# Patient Record
Sex: MALE | Race: WHITE | NOT HISPANIC OR LATINO | Employment: FULL TIME | URBAN - METROPOLITAN AREA
[De-identification: names, ages, dates, MRNs, and addresses within clinical notes are randomized per-mention and may not be internally consistent; named-entity substitution may affect disease eponyms.]

---

## 2022-05-31 ENCOUNTER — LAB REQUISITION (OUTPATIENT)
Dept: LAB | Facility: CLINIC | Age: 32
End: 2022-05-31
Payer: COMMERCIAL

## 2022-05-31 DIAGNOSIS — M25.569 PAIN IN UNSPECIFIED KNEE: ICD-10-CM

## 2022-05-31 LAB
% LINING CELLS, BODY FLUID: 15 %
APPEARANCE FLD: ABNORMAL
CELL COUNT BODY FLUID SOURCE: ABNORMAL
COLOR FLD: YELLOW
LYMPHOCYTES NFR FLD MANUAL: 23 %
MONOS+MACROS NFR FLD MANUAL: 15 %
NEUTS BAND NFR FLD MANUAL: 47 %
WBC # FLD AUTO: 4809 /UL

## 2022-05-31 PROCEDURE — 87476 LYME DIS DNA AMP PROBE: CPT | Mod: ORL | Performed by: ORTHOPAEDIC SURGERY

## 2022-05-31 PROCEDURE — 87070 CULTURE OTHR SPECIMN AEROBIC: CPT | Mod: ORL | Performed by: ORTHOPAEDIC SURGERY

## 2022-05-31 PROCEDURE — 89051 BODY FLUID CELL COUNT: CPT | Mod: ORL | Performed by: ORTHOPAEDIC SURGERY

## 2022-06-03 LAB — B BURGDOR DNA SPEC QL NAA+PROBE: NOT DETECTED

## 2022-06-06 LAB — BACTERIA SNV CULT: NO GROWTH

## 2022-08-29 ENCOUNTER — LAB REQUISITION (OUTPATIENT)
Dept: LAB | Facility: CLINIC | Age: 32
End: 2022-08-29
Payer: COMMERCIAL

## 2022-08-29 DIAGNOSIS — M25.569 PAIN IN UNSPECIFIED KNEE: ICD-10-CM

## 2022-08-29 LAB
APPEARANCE FLD: ABNORMAL
CELL COUNT BODY FLUID SOURCE: ABNORMAL
COLOR FLD: YELLOW
CRYSTALS SNV MICRO: ABNORMAL
GRAM STAIN RESULT: NORMAL
GRAM STAIN RESULT: NORMAL
LYMPHOCYTES NFR FLD MANUAL: 20 %
MONOS+MACROS NFR FLD MANUAL: 30 %
NEUTS BAND NFR FLD MANUAL: 50 %
WBC # FLD AUTO: 7874 /UL

## 2022-08-29 PROCEDURE — 87205 SMEAR GRAM STAIN: CPT | Mod: ORL | Performed by: ORTHOPAEDIC SURGERY

## 2022-08-29 PROCEDURE — 89051 BODY FLUID CELL COUNT: CPT | Mod: ORL | Performed by: ORTHOPAEDIC SURGERY

## 2022-08-29 PROCEDURE — 87075 CULTR BACTERIA EXCEPT BLOOD: CPT | Mod: ORL | Performed by: ORTHOPAEDIC SURGERY

## 2022-08-29 PROCEDURE — 89060 EXAM SYNOVIAL FLUID CRYSTALS: CPT | Mod: ORL | Performed by: ORTHOPAEDIC SURGERY

## 2022-08-29 PROCEDURE — 87070 CULTURE OTHR SPECIMN AEROBIC: CPT | Mod: ORL | Performed by: ORTHOPAEDIC SURGERY

## 2022-09-03 LAB — BACTERIA SNV CULT: NO GROWTH

## 2022-09-12 LAB — BACTERIA SNV CULT: NORMAL

## 2024-10-28 ENCOUNTER — APPOINTMENT (OUTPATIENT)
Dept: GENERAL RADIOLOGY | Facility: CLINIC | Age: 34
End: 2024-10-28
Attending: EMERGENCY MEDICINE
Payer: COMMERCIAL

## 2024-10-28 ENCOUNTER — HOSPITAL ENCOUNTER (EMERGENCY)
Facility: CLINIC | Age: 34
Discharge: HOME | End: 2024-10-28
Attending: STUDENT IN AN ORGANIZED HEALTH CARE EDUCATION/TRAINING PROGRAM | Admitting: STUDENT IN AN ORGANIZED HEALTH CARE EDUCATION/TRAINING PROGRAM
Payer: COMMERCIAL

## 2024-10-28 ENCOUNTER — APPOINTMENT (OUTPATIENT)
Dept: ULTRASOUND IMAGING | Facility: CLINIC | Age: 34
End: 2024-10-28
Attending: STUDENT IN AN ORGANIZED HEALTH CARE EDUCATION/TRAINING PROGRAM
Payer: COMMERCIAL

## 2024-10-28 VITALS
DIASTOLIC BLOOD PRESSURE: 96 MMHG | OXYGEN SATURATION: 99 % | SYSTOLIC BLOOD PRESSURE: 166 MMHG | WEIGHT: 228 LBS | HEIGHT: 72 IN | TEMPERATURE: 98.2 F | RESPIRATION RATE: 18 BRPM | BODY MASS INDEX: 30.88 KG/M2 | HEART RATE: 88 BPM

## 2024-10-28 DIAGNOSIS — E80.6 UNCONJUGATED HYPERBILIRUBINEMIA: ICD-10-CM

## 2024-10-28 DIAGNOSIS — M79.621 PAIN OF RIGHT UPPER ARM: ICD-10-CM

## 2024-10-28 LAB
ALBUMIN SERPL BCG-MCNC: 4.5 G/DL (ref 3.5–5.2)
ALBUMIN SERPL BCG-MCNC: 4.5 G/DL (ref 3.5–5.2)
ALP SERPL-CCNC: 260 U/L (ref 40–150)
ALP SERPL-CCNC: 264 U/L (ref 40–150)
ALT SERPL W P-5'-P-CCNC: 43 U/L (ref 0–70)
ALT SERPL W P-5'-P-CCNC: 45 U/L (ref 0–70)
ANION GAP SERPL CALCULATED.3IONS-SCNC: 12 MMOL/L (ref 7–15)
APAP SERPL-MCNC: <5 UG/ML (ref 10–30)
AST SERPL W P-5'-P-CCNC: ABNORMAL U/L
AST SERPL W P-5'-P-CCNC: ABNORMAL U/L
BILIRUB DIRECT SERPL-MCNC: 1.27 MG/DL (ref 0–0.3)
BILIRUB SERPL-MCNC: 4.6 MG/DL
BILIRUB SERPL-MCNC: 4.6 MG/DL
BILIRUB SERPL-MCNC: 4.7 MG/DL
BUN SERPL-MCNC: 7.6 MG/DL (ref 6–20)
CALCIUM SERPL-MCNC: 10 MG/DL (ref 8.8–10.4)
CHLORIDE SERPL-SCNC: 97 MMOL/L (ref 98–107)
CK SERPL-CCNC: 78 U/L (ref 39–308)
CREAT SERPL-MCNC: 0.74 MG/DL (ref 0.67–1.17)
EGFRCR SERPLBLD CKD-EPI 2021: >90 ML/MIN/1.73M2
ERYTHROCYTE [DISTWIDTH] IN BLOOD BY AUTOMATED COUNT: 13.8 % (ref 10–15)
GLUCOSE SERPL-MCNC: 134 MG/DL (ref 70–99)
HCO3 SERPL-SCNC: 26 MMOL/L (ref 22–29)
HCT VFR BLD AUTO: 40.2 % (ref 40–53)
HGB BLD-MCNC: 13.9 G/DL (ref 13.3–17.7)
HOLD SPECIMEN: NORMAL
MCH RBC QN AUTO: 34.6 PG (ref 26.5–33)
MCHC RBC AUTO-ENTMCNC: 34.6 G/DL (ref 31.5–36.5)
MCV RBC AUTO: 100 FL (ref 78–100)
MONOCYTES NFR BLD AUTO: NEGATIVE %
PLATELET # BLD AUTO: 118 10E3/UL (ref 150–450)
POTASSIUM SERPL-SCNC: 4.6 MMOL/L (ref 3.4–5.3)
PROT SERPL-MCNC: 9.2 G/DL (ref 6.4–8.3)
PROT SERPL-MCNC: 9.3 G/DL (ref 6.4–8.3)
RBC # BLD AUTO: 4.02 10E6/UL (ref 4.4–5.9)
SODIUM SERPL-SCNC: 135 MMOL/L (ref 135–145)
WBC # BLD AUTO: 6.5 10E3/UL (ref 4–11)

## 2024-10-28 PROCEDURE — 82247 BILIRUBIN TOTAL: CPT | Performed by: STUDENT IN AN ORGANIZED HEALTH CARE EDUCATION/TRAINING PROGRAM

## 2024-10-28 PROCEDURE — 86308 HETEROPHILE ANTIBODY SCREEN: CPT | Performed by: STUDENT IN AN ORGANIZED HEALTH CARE EDUCATION/TRAINING PROGRAM

## 2024-10-28 PROCEDURE — 93971 EXTREMITY STUDY: CPT | Mod: RT

## 2024-10-28 PROCEDURE — 84155 ASSAY OF PROTEIN SERUM: CPT | Performed by: STUDENT IN AN ORGANIZED HEALTH CARE EDUCATION/TRAINING PROGRAM

## 2024-10-28 PROCEDURE — 99285 EMERGENCY DEPT VISIT HI MDM: CPT | Mod: 25

## 2024-10-28 PROCEDURE — 250N000013 HC RX MED GY IP 250 OP 250 PS 637: Performed by: STUDENT IN AN ORGANIZED HEALTH CARE EDUCATION/TRAINING PROGRAM

## 2024-10-28 PROCEDURE — 36415 COLL VENOUS BLD VENIPUNCTURE: CPT | Performed by: STUDENT IN AN ORGANIZED HEALTH CARE EDUCATION/TRAINING PROGRAM

## 2024-10-28 PROCEDURE — 85014 HEMATOCRIT: CPT | Performed by: STUDENT IN AN ORGANIZED HEALTH CARE EDUCATION/TRAINING PROGRAM

## 2024-10-28 PROCEDURE — 82550 ASSAY OF CK (CPK): CPT | Performed by: STUDENT IN AN ORGANIZED HEALTH CARE EDUCATION/TRAINING PROGRAM

## 2024-10-28 PROCEDURE — 86618 LYME DISEASE ANTIBODY: CPT | Performed by: STUDENT IN AN ORGANIZED HEALTH CARE EDUCATION/TRAINING PROGRAM

## 2024-10-28 PROCEDURE — 73030 X-RAY EXAM OF SHOULDER: CPT | Mod: RT

## 2024-10-28 PROCEDURE — 80143 DRUG ASSAY ACETAMINOPHEN: CPT | Performed by: STUDENT IN AN ORGANIZED HEALTH CARE EDUCATION/TRAINING PROGRAM

## 2024-10-28 PROCEDURE — 82248 BILIRUBIN DIRECT: CPT | Performed by: STUDENT IN AN ORGANIZED HEALTH CARE EDUCATION/TRAINING PROGRAM

## 2024-10-28 RX ORDER — OXYCODONE HYDROCHLORIDE 5 MG/1
5 TABLET ORAL ONCE
Status: COMPLETED | OUTPATIENT
Start: 2024-10-28 | End: 2024-10-28

## 2024-10-28 RX ORDER — OXYCODONE HYDROCHLORIDE 5 MG/1
5 TABLET ORAL EVERY 6 HOURS PRN
Qty: 10 TABLET | Refills: 0 | Status: SHIPPED | OUTPATIENT
Start: 2024-10-28

## 2024-10-28 RX ADMIN — OXYCODONE HYDROCHLORIDE 5 MG: 5 TABLET ORAL at 12:18

## 2024-10-28 ASSESSMENT — ACTIVITIES OF DAILY LIVING (ADL)
ADLS_ACUITY_SCORE: 0

## 2024-10-28 ASSESSMENT — COLUMBIA-SUICIDE SEVERITY RATING SCALE - C-SSRS
6. HAVE YOU EVER DONE ANYTHING, STARTED TO DO ANYTHING, OR PREPARED TO DO ANYTHING TO END YOUR LIFE?: NO
2. HAVE YOU ACTUALLY HAD ANY THOUGHTS OF KILLING YOURSELF IN THE PAST MONTH?: NO
1. IN THE PAST MONTH, HAVE YOU WISHED YOU WERE DEAD OR WISHED YOU COULD GO TO SLEEP AND NOT WAKE UP?: NO

## 2024-10-28 NOTE — ED TRIAGE NOTES
Pt presents with right arm/shoulder pain ongoing for past two days. Pt reports he was camping yesterday when he started to notice. Pt reports his neck is stiff as well. Pt slept on camping pad then in  car during pain. Pt took 1500 mg tylenol and 150 mg of diclofenac.      Triage Assessment (Adult)       Row Name 10/28/24 1117          Triage Assessment    Airway WDL WDL        Respiratory WDL    Respiratory WDL WDL        Cognitive/Neuro/Behavioral WDL    Cognitive/Neuro/Behavioral WDL WDL

## 2024-10-29 LAB — B BURGDOR IGG+IGM SER QL: 0.32

## 2025-05-13 NOTE — ED PROVIDER NOTES
Emergency Department Note      History of Present Illness     Chief Complaint   Generalized Body Aches      GAIL Brown is a 34 year old male here for evaluation of generalized body aches. Patient reports neck stiffness, bilateral shoulder and hand stiffness, mild leg swelling for the past two days. Right shoulder more stiff than left. States his right knuckles are stiff and unable to close his hand. Pain was intolerable this morning. States he just returned from camping yesterday at  [facility] ground  Took 1500 mg Tylenol this morning between 6 and 7 AM and 150 mg Diclonefnac at 1200 which improved his pain. States he slept on his right side. Notes history of gout in his left knee, doesn't feel like this. Denies double vision, headache, fevers, rashes, jvd, chest pain, shortness of breath. Works as  for [employer].     Independent Historian   None    Review of External Notes   None    Past Medical History     Medical History and Problem List   The patient does not have any past pertinent medical history.     Medications   The patient is currently on no regular medications.      Physical Exam     Patient Vitals for the past 24 hrs:   BP Temp Temp src Pulse Resp SpO2 Height Weight   10/28/24 1700 -- -- -- 88 -- -- -- --   10/28/24 1120 (!) 166/96 98.2  F (36.8  C) Temporal 118 18 99 % 1.829 m (6') 103.4 kg (228 lb)     Physical Exam  General: Awake, alert, in no acute distress   HEENT: Atraumatic   EOM normal   External ears normal   Trachea midline  Neck: Supple, normal ROM  CV: Regular rate, regular rhythm   No lower extremity edema  2+ radial and DP pulses  PULM: Breath sounds normal bilaterally  No wheezes or rales  ABD: Soft, non-tender, non-distended  Normal bowel sounds   No rebound or guarding   MSK: No gross deformities.   Mild swelling right hand  No erythema on right upper extremity  Right arm compartments are soft.  No focal bony tenderness.  Right elbow  with nontender bursa swelling and tophi.  NEURO: Alert, no focal deficits  5 out of 5 strength with right hand .  Skin: Warm, dry and intact      Diagnostics     Lab Results   Labs Ordered and Resulted from Time of ED Arrival to Time of ED Departure   CBC WITH PLATELETS - Abnormal       Result Value    WBC Count 6.5      RBC Count 4.02 (*)     Hemoglobin 13.9      Hematocrit 40.2            MCH 34.6 (*)     MCHC 34.6      RDW 13.8      Platelet Count 118 (*)    COMPREHENSIVE METABOLIC PANEL - Abnormal    Sodium 135      Potassium 4.6      Carbon Dioxide (CO2) 26      Anion Gap 12      Urea Nitrogen 7.6      Creatinine 0.74      GFR Estimate >90      Calcium 10.0      Chloride 97 (*)     Glucose 134 (*)     Alkaline Phosphatase 260 (*)     AST        ALT 43      Protein Total 9.2 (*)     Albumin 4.5      Bilirubin Total 4.6 (*)    ACETAMINOPHEN LEVEL - Abnormal    Acetaminophen <5.0 (*)    HEPATIC FUNCTION PANEL - Abnormal    Protein Total 9.3 (*)     Albumin 4.5      Bilirubin Total 4.6 (*)     Alkaline Phosphatase 264 (*)     AST        ALT 45      Bilirubin Direct 1.27 (*)    BILIRUBIN TOTAL - Abnormal    Bilirubin Total 4.7 (*)    CK TOTAL - Normal    CK 78     MONONUCLEOSIS SCREEN - Normal    Mononucleosis Screen Negative     LYME DISEASE TOTAL ANTIBODIES WITH REFLEX TO CONFIRMATION       Imaging   US Upper Extremity Venous Duplex Right   Final Result   IMPRESSION: Normal upper extremity venous ultrasound.      BRITANY PLUMMER MD            SYSTEM ID:  [id number]      XR Shoulder Right G/E 3 Views   Final Result   IMPRESSION: The right glenohumeral and acromioclavicular joints are   negative for fracture or dislocation. Mild hypertrophic change of the   AC joint.      PANTERA ADAMSON MD            SYSTEM ID:  [id number]            Independent Interpretation   None    ED Course      Medications Administered   Medications   oxyCODONE (ROXICODONE) tablet 5 mg (5 mg Oral $Given 10/28/24 1219)        Procedures   Procedures     Discussion of Management   None    ED Course   ED Course as of 10/28/24 2110   Mon Oct 28, 2024   1200 I obtained history and examined the patient as noted above.        Additional Documentation  None    Medical Decision Making / Diagnosis     CMS Diagnoses: None    MIPS       None    SHAYLEE   Ricci Brown is a 34 year old male who presents with nonspecific pain of the right arm.  He has history of gout in his left knee, chronic changes of the right elbow to suggest gout.  X-ray of the shoulder was negative for fractures or dislocations.  CK and kidney function are normal making traumatic or compressive rhabdomyolysis unlikely.  His compartments are soft.  There is no overlying erythema, no specific joint swelling to make septic or gouty arthritis higher on my differential today.  He does have been indirect hyperbilirubinemia and mild thrombocytopenia.  He says he is recovering from a viral illness last week which could be the cause though also endorses more alcohol intake than he would like.  I encouraged him to cut back.  I will send a referral for primary care for to establish care and for recheck of LFTs.  His Tylenol level is undetectable at 6 to 7 hours post minimal accidental overdose, I do not think his LFTs are elevated from acetaminophen toxicity.  Given his report of myalgias, and recent camping near the [facility], I will send a Lyme titer which is pending on discharge.  Discussed appropriate Tylenol and Voltaren dosing.  Will prescribe short course of oxycodone for breakthrough pain, ED return precautions discussed.  Patient discharged home in improved conditions.  All questions answered.    Disposition   The patient was discharged.     Diagnosis     ICD-10-CM    1. Pain of right upper arm  M79.621 Primary Care Referral      2. Unconjugated hyperbilirubinemia  E80.6            Discharge Medications   Discharge Medication List as of 10/28/2024  5:22 PM         START taking these medications    Details   oxyCODONE (ROXICODONE) 5 MG tablet Take 1 tablet (5 mg) by mouth every 6 hours as needed for breakthrough pain., Disp-10 tablet, R-0, E-Prescribe               Scribe Disclosure:  I, Rae Baird, am serving as a scribe at 5:51 PM on 10/28/2024 to document services personally performed by Elizabeth Belcher,   based on my observations and the provider's statements to me.        Elizabeth Belcher DO  10/28/24 2110     No

## 2025-07-08 ENCOUNTER — HOSPITAL ENCOUNTER (INPATIENT)
Facility: CLINIC | Age: 35
End: 2025-07-08
Attending: EMERGENCY MEDICINE | Admitting: STUDENT IN AN ORGANIZED HEALTH CARE EDUCATION/TRAINING PROGRAM
Payer: COMMERCIAL

## 2025-07-08 ENCOUNTER — APPOINTMENT (OUTPATIENT)
Dept: ULTRASOUND IMAGING | Facility: CLINIC | Age: 35
End: 2025-07-08
Attending: EMERGENCY MEDICINE
Payer: COMMERCIAL

## 2025-07-08 ENCOUNTER — APPOINTMENT (OUTPATIENT)
Dept: CT IMAGING | Facility: CLINIC | Age: 35
End: 2025-07-08
Attending: EMERGENCY MEDICINE
Payer: COMMERCIAL

## 2025-07-08 DIAGNOSIS — K66.8 PNEUMOPERITONEUM: Primary | ICD-10-CM

## 2025-07-08 DIAGNOSIS — K80.21 CALCULUS OF GALLBLADDER WITH BILIARY OBSTRUCTION BUT WITHOUT CHOLECYSTITIS: ICD-10-CM

## 2025-07-08 DIAGNOSIS — K70.31 ALCOHOLIC CIRRHOSIS OF LIVER WITH ASCITES (H): ICD-10-CM

## 2025-07-08 LAB
ALBUMIN SERPL BCG-MCNC: 4.4 G/DL (ref 3.5–5.2)
ALBUMIN UR-MCNC: 50 MG/DL
ALP SERPL-CCNC: 286 U/L (ref 40–150)
ALT SERPL W P-5'-P-CCNC: 64 U/L (ref 0–70)
ANION GAP SERPL CALCULATED.3IONS-SCNC: 18 MMOL/L (ref 7–15)
APPEARANCE UR: ABNORMAL
AST SERPL W P-5'-P-CCNC: 231 U/L (ref 0–45)
BACTERIA #/AREA URNS HPF: ABNORMAL /HPF
BASOPHILS # BLD AUTO: 0 10E3/UL (ref 0–0.2)
BASOPHILS NFR BLD AUTO: 1 %
BILIRUB DIRECT SERPL-MCNC: 2.44 MG/DL (ref 0–0.3)
BILIRUB SERPL-MCNC: 5.9 MG/DL
BILIRUB UR QL STRIP: ABNORMAL
BUN SERPL-MCNC: 5.3 MG/DL (ref 6–20)
CALCIUM SERPL-MCNC: 10.3 MG/DL (ref 8.8–10.4)
CHLORIDE SERPL-SCNC: 97 MMOL/L (ref 98–107)
COLOR UR AUTO: ABNORMAL
CREAT SERPL-MCNC: 0.72 MG/DL (ref 0.67–1.17)
EGFRCR SERPLBLD CKD-EPI 2021: >90 ML/MIN/1.73M2
EOSINOPHIL # BLD AUTO: 0.1 10E3/UL (ref 0–0.7)
EOSINOPHIL NFR BLD AUTO: 1 %
ERYTHROCYTE [DISTWIDTH] IN BLOOD BY AUTOMATED COUNT: 13 % (ref 10–15)
ETHANOL SERPL-MCNC: 0.03 G/DL
GLUCOSE SERPL-MCNC: 167 MG/DL (ref 70–99)
GLUCOSE UR STRIP-MCNC: 50 MG/DL
HCO3 SERPL-SCNC: 22 MMOL/L (ref 22–29)
HCT VFR BLD AUTO: 42.4 % (ref 40–53)
HGB BLD-MCNC: 14.7 G/DL (ref 13.3–17.7)
HGB UR QL STRIP: NEGATIVE
HOLD SPECIMEN: NORMAL
HOLD SPECIMEN: NORMAL
HYALINE CASTS: 29 /LPF
IMM GRANULOCYTES # BLD: 0 10E3/UL
IMM GRANULOCYTES NFR BLD: 0 %
KETONES UR STRIP-MCNC: NEGATIVE MG/DL
LEUKOCYTE ESTERASE UR QL STRIP: NEGATIVE
LIPASE SERPL-CCNC: 38 U/L (ref 13–60)
LYMPHOCYTES # BLD AUTO: 1 10E3/UL (ref 0.8–5.3)
LYMPHOCYTES NFR BLD AUTO: 22 %
MCH RBC QN AUTO: 34.8 PG (ref 26.5–33)
MCHC RBC AUTO-ENTMCNC: 34.7 G/DL (ref 31.5–36.5)
MCV RBC AUTO: 100 FL (ref 78–100)
MONOCYTES # BLD AUTO: 0.2 10E3/UL (ref 0–1.3)
MONOCYTES NFR BLD AUTO: 5 %
MUCOUS THREADS #/AREA URNS LPF: PRESENT /LPF
NEUTROPHILS # BLD AUTO: 3.2 10E3/UL (ref 1.6–8.3)
NEUTROPHILS NFR BLD AUTO: 71 %
NITRATE UR QL: NEGATIVE
NRBC # BLD AUTO: 0 10E3/UL
NRBC BLD AUTO-RTO: 0 /100
PH UR STRIP: 5.5 [PH] (ref 5–7)
PLATELET # BLD AUTO: 68 10E3/UL (ref 150–450)
POTASSIUM SERPL-SCNC: 4.3 MMOL/L (ref 3.4–5.3)
PROT SERPL-MCNC: 9 G/DL (ref 6.4–8.3)
RBC # BLD AUTO: 4.23 10E6/UL (ref 4.4–5.9)
RBC URINE: 3 /HPF
SODIUM SERPL-SCNC: 137 MMOL/L (ref 135–145)
SP GR UR STRIP: 1.03 (ref 1–1.03)
SQUAMOUS EPITHELIAL: 1 /HPF
UROBILINOGEN UR STRIP-MCNC: 3 MG/DL
WBC # BLD AUTO: 4.5 10E3/UL (ref 4–11)
WBC URINE: 8 /HPF

## 2025-07-08 PROCEDURE — 82947 ASSAY GLUCOSE BLOOD QUANT: CPT | Performed by: EMERGENCY MEDICINE

## 2025-07-08 PROCEDURE — 96375 TX/PRO/DX INJ NEW DRUG ADDON: CPT

## 2025-07-08 PROCEDURE — 120N000001 HC R&B MED SURG/OB

## 2025-07-08 PROCEDURE — 250N000009 HC RX 250: Performed by: EMERGENCY MEDICINE

## 2025-07-08 PROCEDURE — 85004 AUTOMATED DIFF WBC COUNT: CPT | Performed by: EMERGENCY MEDICINE

## 2025-07-08 PROCEDURE — 86709 HEPATITIS A IGM ANTIBODY: CPT | Performed by: STUDENT IN AN ORGANIZED HEALTH CARE EDUCATION/TRAINING PROGRAM

## 2025-07-08 PROCEDURE — 99285 EMERGENCY DEPT VISIT HI MDM: CPT | Mod: 25 | Performed by: EMERGENCY MEDICINE

## 2025-07-08 PROCEDURE — 250N000011 HC RX IP 250 OP 636: Performed by: EMERGENCY MEDICINE

## 2025-07-08 PROCEDURE — 83690 ASSAY OF LIPASE: CPT | Performed by: EMERGENCY MEDICINE

## 2025-07-08 PROCEDURE — 99222 1ST HOSP IP/OBS MODERATE 55: CPT | Performed by: STUDENT IN AN ORGANIZED HEALTH CARE EDUCATION/TRAINING PROGRAM

## 2025-07-08 PROCEDURE — 74177 CT ABD & PELVIS W/CONTRAST: CPT

## 2025-07-08 PROCEDURE — 82248 BILIRUBIN DIRECT: CPT | Performed by: EMERGENCY MEDICINE

## 2025-07-08 PROCEDURE — 82077 ASSAY SPEC XCP UR&BREATH IA: CPT | Performed by: EMERGENCY MEDICINE

## 2025-07-08 PROCEDURE — 76705 ECHO EXAM OF ABDOMEN: CPT

## 2025-07-08 PROCEDURE — 36415 COLL VENOUS BLD VENIPUNCTURE: CPT | Performed by: EMERGENCY MEDICINE

## 2025-07-08 PROCEDURE — 81001 URINALYSIS AUTO W/SCOPE: CPT | Performed by: EMERGENCY MEDICINE

## 2025-07-08 PROCEDURE — 258N000003 HC RX IP 258 OP 636: Performed by: EMERGENCY MEDICINE

## 2025-07-08 PROCEDURE — 96374 THER/PROPH/DIAG INJ IV PUSH: CPT | Mod: 59

## 2025-07-08 RX ORDER — KETOROLAC TROMETHAMINE 15 MG/ML
15 INJECTION, SOLUTION INTRAMUSCULAR; INTRAVENOUS ONCE
Status: COMPLETED | OUTPATIENT
Start: 2025-07-08 | End: 2025-07-08

## 2025-07-08 RX ORDER — LIDOCAINE 40 MG/G
CREAM TOPICAL
Status: ACTIVE | OUTPATIENT
Start: 2025-07-08

## 2025-07-08 RX ORDER — AMOXICILLIN 250 MG
2 CAPSULE ORAL 2 TIMES DAILY PRN
Status: DISPENSED | OUTPATIENT
Start: 2025-07-08

## 2025-07-08 RX ORDER — MORPHINE SULFATE 4 MG/ML
4 INJECTION, SOLUTION INTRAMUSCULAR; INTRAVENOUS
Refills: 0 | Status: DISCONTINUED | OUTPATIENT
Start: 2025-07-08 | End: 2025-07-09 | Stop reason: ALTCHOICE

## 2025-07-08 RX ORDER — AMOXICILLIN 250 MG
1 CAPSULE ORAL 2 TIMES DAILY PRN
Status: ACTIVE | OUTPATIENT
Start: 2025-07-08

## 2025-07-08 RX ORDER — IOPAMIDOL 755 MG/ML
500 INJECTION, SOLUTION INTRAVASCULAR ONCE
Status: COMPLETED | OUTPATIENT
Start: 2025-07-08 | End: 2025-07-08

## 2025-07-08 RX ORDER — CALCIUM CARBONATE 500 MG/1
1000 TABLET, CHEWABLE ORAL 4 TIMES DAILY PRN
Status: DISPENSED | OUTPATIENT
Start: 2025-07-08

## 2025-07-08 RX ORDER — THERA TABS 400 MCG
1 TAB ORAL DAILY
Status: ON HOLD | COMMUNITY

## 2025-07-08 RX ORDER — ONDANSETRON 2 MG/ML
4 INJECTION INTRAMUSCULAR; INTRAVENOUS EVERY 30 MIN PRN
Status: COMPLETED | OUTPATIENT
Start: 2025-07-08 | End: 2025-07-15

## 2025-07-08 RX ORDER — DICLOFENAC SODIUM 75 MG/1
150 TABLET, DELAYED RELEASE ORAL DAILY
Status: ON HOLD | COMMUNITY

## 2025-07-08 RX ADMIN — SODIUM CHLORIDE 65 ML: 9 INJECTION, SOLUTION INTRAVENOUS at 16:19

## 2025-07-08 RX ADMIN — SODIUM CHLORIDE, SODIUM LACTATE, POTASSIUM CHLORIDE, AND CALCIUM CHLORIDE 1000 ML: .6; .31; .03; .02 INJECTION, SOLUTION INTRAVENOUS at 16:11

## 2025-07-08 RX ADMIN — IOPAMIDOL 100 ML: 755 INJECTION, SOLUTION INTRAVENOUS at 16:19

## 2025-07-08 RX ADMIN — ONDANSETRON 4 MG: 2 INJECTION, SOLUTION INTRAMUSCULAR; INTRAVENOUS at 16:12

## 2025-07-08 RX ADMIN — MORPHINE SULFATE 4 MG: 4 INJECTION, SOLUTION INTRAMUSCULAR; INTRAVENOUS at 17:55

## 2025-07-08 RX ADMIN — KETOROLAC TROMETHAMINE 15 MG: 15 INJECTION, SOLUTION INTRAMUSCULAR; INTRAVENOUS at 15:15

## 2025-07-08 ASSESSMENT — ACTIVITIES OF DAILY LIVING (ADL)
ADLS_ACUITY_SCORE: 20
ADLS_ACUITY_SCORE: 15
ADLS_ACUITY_SCORE: 41
ADLS_ACUITY_SCORE: 15
ADLS_ACUITY_SCORE: 15
ADLS_ACUITY_SCORE: 41

## 2025-07-08 ASSESSMENT — COLUMBIA-SUICIDE SEVERITY RATING SCALE - C-SSRS
1. IN THE PAST MONTH, HAVE YOU WISHED YOU WERE DEAD OR WISHED YOU COULD GO TO SLEEP AND NOT WAKE UP?: NO
6. HAVE YOU EVER DONE ANYTHING, STARTED TO DO ANYTHING, OR PREPARED TO DO ANYTHING TO END YOUR LIFE?: NO
2. HAVE YOU ACTUALLY HAD ANY THOUGHTS OF KILLING YOURSELF IN THE PAST MONTH?: NO

## 2025-07-08 NOTE — ED PROVIDER NOTES
Emergency Department Note      History of Present Illness     Chief Complaint   Abdominal Pain      HPI   HPI   Ricci Brown is a 35 year old male with no significant past medical history significant who presents to the ED via/accompanied by self with a chief complaint of right upper quadrant abdominal pain onset approximately 20 minutes prior to arrival while the patient was at work, nonradiating, worse with movement and deep breaths without specific alleviating factors.  Patient has chest pain, shortness of breath, vomiting although endorses nausea, changes in urination or bowel movements, previous abdominal surgeries or similar episodes previously    Independent Historian   None    Review of External Notes   None pertinent    Past Medical History     Medical History and Problem List   No past medical history on file.    Medications   oxyCODONE (ROXICODONE) 5 MG tablet        Surgical History   No past surgical history on file.    Physical Exam     Patient Vitals for the past 24 hrs:   BP Temp Pulse Resp SpO2 Height Weight   07/08/25 1800 (!) 140/81 -- (!) 121 22 96 % -- --   07/08/25 1700 122/81 -- 105 23 93 % -- --   07/08/25 1640 136/78 -- 107 -- 94 % -- --   07/08/25 1507 (!) 155/106 -- -- -- -- -- --   07/08/25 1505 -- 98.5  F (36.9  C) (!) 123 24 97 % 1.829 m (6') 106.6 kg (235 lb)     Physical Exam  Constitutional: Well developed, uncomfortable, nontox appearance  Head: Atraumatic.   Neck:  no stridor  Eyes: scleral icterus  Cardiovascular: regular tachycardia, 2+ bilat radial pulses  Pulmonary/Chest: nml resp effort, Clear BS bilat  Abdominal: ND, soft, RUQ tenderness, no rebound or guarding   : no CVA tenderness bilat  Ext: Warm, well perfused, trace bilat LE edema  Neurological: A&O, symmetric facies, moves ext x4  Skin: Skin is warm and dry.   Psychiatric: Behavior is normal. Thought content normal.   Nursing note and vitals reviewed.    Diagnostics     Lab Results   Labs Ordered and  Resulted from Time of ED Arrival to Time of ED Departure   COMPREHENSIVE METABOLIC PANEL - Abnormal       Result Value    Sodium 137      Potassium 4.3      Carbon Dioxide (CO2) 22      Anion Gap 18 (*)     Urea Nitrogen 5.3 (*)     Creatinine 0.72      GFR Estimate >90      Calcium 10.3      Chloride 97 (*)     Glucose 167 (*)     Alkaline Phosphatase 286 (*)      (*)     ALT 64      Protein Total 9.0 (*)     Albumin 4.4      Bilirubin Total 5.9 (*)    CBC WITH PLATELETS AND DIFFERENTIAL - Abnormal    WBC Count 4.5      RBC Count 4.23 (*)     Hemoglobin 14.7      Hematocrit 42.4            MCH 34.8 (*)     MCHC 34.7      RDW 13.0      Platelet Count 68 (*)     % Neutrophils 71      % Lymphocytes 22      % Monocytes 5      % Eosinophils 1      % Basophils 1      % Immature Granulocytes 0      NRBCs per 100 WBC 0      Absolute Neutrophils 3.2      Absolute Lymphocytes 1.0      Absolute Monocytes 0.2      Absolute Eosinophils 0.1      Absolute Basophils 0.0      Absolute Immature Granulocytes 0.0      Absolute NRBCs 0.0     ROUTINE UA WITH MICROSCOPIC REFLEX TO CULTURE - Abnormal    Color Urine Orange (*)     Appearance Urine Slightly Cloudy (*)     Glucose Urine 50 (*)     Bilirubin Urine Moderate (*)     Ketones Urine Negative      Specific Gravity Urine 1.034      Blood Urine Negative      pH Urine 5.5      Protein Albumin Urine 50 (*)     Urobilinogen Urine 3.0 (*)     Nitrite Urine Negative      Leukocyte Esterase Urine Negative      Bacteria Urine Few (*)     Mucus Urine Present (*)     RBC Urine 3 (*)     WBC Urine 8 (*)     Squamous Epithelials Urine 1      Hyaline Casts Urine 29 (*)    BILIRUBIN DIRECT - Abnormal    Bilirubin Direct 2.44 (*)    ETHANOL LEVEL BLOOD - Abnormal    Ethanol Level Blood 0.03 (*)    LIPASE - Normal    Lipase 38         Imaging   CT Abdomen Pelvis w Contrast   Final Result   IMPRESSION:    1.  Cirrhotic morphology of the liver with evidence of portal hypertension and  trace abdominopelvic free fluid. No definite drainable ascites at this time.   2.  Superimposed hepatic steatosis is nonspecific but given heterogeneity and subtle surrounding fat stranding, superimposed acute hepatitis is also possible.   3.  Likely cholelithiasis and/or sludge with some subtle fat stranding adjacent to gallbladder, could be related to #'s 1 and 2, but consider further evaluation with right upper quadrant ultrasound if there is clinical concern for acute cholecystitis. No    evidence of biliary obstruction.      US Abdomen Limited    (Results Pending)       EKG   No results found for this or any previous visit. .    Independent Interpretation   None    ED Course      Medications Administered   Medications   ondansetron (ZOFRAN) injection 4 mg (4 mg Intravenous $Given 7/8/25 1612)   morphine (PF) injection 4 mg (4 mg Intravenous $Given 7/8/25 1755)   ketorolac (TORADOL) injection 15 mg (15 mg Intravenous $Given 7/8/25 1515)   lactated ringers BOLUS 1,000 mL (1,000 mLs Intravenous $New Bag 7/8/25 1611)   iopamidol (ISOVUE-370) solution 500 mL (100 mLs Intravenous $Given 7/8/25 1619)   sodium chloride 0.9 % bag for CT scan flush (65 mLs Intravenous $Given 7/8/25 1619)       Procedures   Procedures     Discussion of Management   Admitting HospitalistOziel    ED Course        Additional Documentation  Social determinants include alcohol use potentially increasing risk for presentation to the ED    Medical Decision Making / Diagnosis     CMS Diagnoses: None    MIPS       None    MDM     35 year old male presenting w/ right upper quadrant abdominal pain    Differential diagnosis includes hepatitis, cholelithiasis, cholecystitis, choledocholithiasis, gastritis, pancreatitis.  Less likely vascular catastrophe given history and physical exam.  Labs significant for worsening hyperbilirubinemia with elevated AST, normal white blood cell count.  Imaging sig for findings consistent with liver cirrhosis,  cholelithiasis.  Interventions as noted above.  Given patient's LFTs, he may have superimposed choledocholithiasis on possible alcoholic hepatitis/cirrhosis.  Given his persistent pain, abnormal labs, I think admission to the hospital service for further evaluation and likely MRCP is indicated.  Doubt acute cholangitis therefore antibiotics deferred.  The patient was subsequently admitted to the hospitalist service for further evaluation and management.  Pt counseled on all results, disposition and diagnosis.  They are understanding and agreeable to plan. Patient admitted in guarded condition.      Disposition   The patient was admitted to the hospital.     Diagnosis     ICD-10-CM    1. Alcoholic cirrhosis of liver with ascites (H)  K70.31       2. Calculus of gallbladder with biliary obstruction but without cholecystitis  K80.21            Discharge Medications   New Prescriptions    No medications on file         MD Jose Boyer Christopher E, MD  07/08/25 2775     11

## 2025-07-08 NOTE — H&P
[facility]    History and Physical - Hospitalist Service       Date of Admission:  7/8/2025    Assessment & Plan      Ricci Brown is a 35 year old male admitted on 7/8/2025. He has little past medical history who presents to the ED for evaluation of abdominal pain.     Acute hepatitis  Cirrhosis of the liver  Possible choledocholithiasis vs cholecystitis  Sudden onset RUQ pain before admission. Non-radiating, has never happened before. Does endorse heavy alcohol use, 3-4 hard liquor drinks daily. Denies ever going through withdrawal. CT Abd shows cirrhosis and evidence of acute hepatitis. RUQ US showed no evidence of cholecystitis or CBD dilitation. LFTs elevated with , ALT normal, bilirubin 5.9 with direct 2.44. Lipase normal. Did start to have rising temp but no WBC elevation. Overall no evidence of CBD stone, imaging not consistent with cholecystitis. Started zosyn for low grade fever. Obtaining MRCP for further characterization of hepatobiliary tree. [facility] consulted.   - [facility] consulted, appreciate assistance   - Started on IV Zosyn  - IVF  - Keep NPO  - Follow up MRCP  - Follow up viral hepatitis panel    Alcohol use  Reports drinking 3-4 hard liquor drinks each day. Denies ever going through withdrawal. Does have some tachycardia, may be related to above issues but will monitor on CIWA with symptom triggered benzos for now.   - CIWA monitoring with symptom triggered benzodiazepines  - Gabapentin taper  - Thiamine and folate replacement          Diet: NPO for Procedure/Surgery per Anesthesia Guidelines Except for: Meds; Clear liquids before procedure/surgery: ADULT (Age GREATER than or Equal to 18 years) - Clear liquids 2 hours before procedure/surgery    DVT Prophylaxis: Pneumatic Compression Devices  Starr Catheter: Not present  Lines: None     Cardiac Monitoring: None  Code Status: Full Code    Clinically Significant Risk Factors Present on Admission          #  Hypochloremia: Lowest Cl = 97 mmol/L in last 2 days, will monitor as appropriate        # Thrombocytopenia: Lowest platelets = 68 in last 2 days, will monitor for bleeding             # Obesity: Estimated body mass index is 33.72 kg/m  as calculated from the following:    Height as of this encounter: 1.829 m (6').    Weight as of this encounter: 112.8 kg (248 lb 9.6 oz).              Disposition Plan     Medically Ready for Discharge: Anticipated in 2-4 Days           Nabor Ludwig MD  Hospitalist Service  [facility]  Securely message with [employer] (more info)  Text page via [facility] Paging/Directory     ______________________________________________________________________    Chief Complaint   Abd pain, RUQ pain     History is obtained from the patient    History of Present Illness   Ricci Brown is a 35 year old male who has little PMH presents to the ED for evaluation of RUQ pain. Onset about 45 minutes prior to presentation. Sudden onset, no specific inciting event. No other recent illness symptoms, pain is non-radiating, has never had this feeling before. Denies any fever, chills, night sweats, nausea/vomiting.     In ED vitals are stable. Labs notable for elevated transaminases, normal lipase, normal WBC. CT abd showed cirrhosis, no evidence of pancreatitis. RUQ US with no evidence of cholecystitis or CBD dilatation. Being admitted for further evaluation and management.       Past Medical History    No past medical history on file.    Past Surgical History   No past surgical history on file.    Prior to Admission Medications   Prior to Admission Medications   Prescriptions Last Dose Informant Patient Reported? Taking?   MILK THISTLE PO 7/8/2025 Morning  Yes Yes   Sig: Take 1 tablet by mouth daily.   diclofenac (VOLTAREN) 75 MG EC tablet 7/8/2025 Morning  Yes Yes   Sig: Take 150 mg by mouth daily.   multivitamin, therapeutic (THERA-VIT) TABS tablet 7/8/2025 Morning  Yes Yes   Sig: Take  1 tablet by mouth daily.      Facility-Administered Medications: None        Review of Systems    The 10 point Review of Systems is negative other than noted in the HPI or here.      Physical Exam   Vital Signs: Temp: 98.3  F (36.8  C) Temp src: Temporal BP: 137/68 Pulse: (!) 136   Resp: 20 SpO2: 97 % O2 Device: None (Room air)    Weight: 248 lbs 9.6 oz    Constitutional: Lying in bed, appears slightly uncomfortable but no acute distress  Respiratory: Normal WOB, lungs clear  Cardiovascular: RRR, no MRGs  GI: Abd soft, mild TTP RUQ but also discomfort at rest, no epigastric tenderness on exam  Neurologic: AAOx3, mentating clearly    Medical Decision Making       65 MINUTES SPENT BY ME on the date of service doing chart review, history, exam, documentation & further activities per the note.      Data     I have personally reviewed the following data over the past 24 hrs:    4.5  \   14.7   / 68 (L)     137 97 (L) 5.3 (L) /  167 (H)   4.3 22 0.72 \     ALT: 64 AST: 231 (H) AP: 286 (H) TBILI: 5.9 (H)   ALB: 4.4 TOT PROTEIN: 9.0 (H) LIPASE: 38       Imaging results reviewed over the past 24 hrs:   Recent Results (from the past 24 hours)   CT Abdomen Pelvis w Contrast    Narrative    EXAM: CT ABDOMEN PELVIS W CONTRAST  LOCATION: [facility]  DATE: 7/8/2025    INDICATION: Right upper quadrant, R mid abd pain and tenderness  COMPARISON: None.  TECHNIQUE: CT scan of the abdomen and pelvis was performed following injection of IV contrast. Multiplanar reformats were obtained. Dose reduction techniques were used.  CONTRAST: 100mL Isovue 370    FINDINGS:   LOWER CHEST: No focal airspace consolidation or pleural effusion.    HEPATOBILIARY: Nodular contour of liver with widening of the fissures. Diffuse hepatic steatosis. Possible cholelithiasis and/or sludge with some subtle surrounding fat stranding but no definite wall thickening. No evidence of mercedes biliary obstruction.    PANCREAS: Normal.    SPLEEN:  Splenomegaly. No focal lesion visualized.    ADRENAL GLANDS: Normal.    KIDNEYS/BLADDER: No hydronephrosis. Urinary bladder is decompressed but otherwise unremarkable.    BOWEL: No obstruction or inflammatory change. Normal appendix.    LYMPH NODES: No suspicious lymphadenopathy. Small volume abdominopelvic free fluid without drainable ascites.    VASCULATURE: Normal.    PELVIC ORGANS: Normal.    MUSCULOSKELETAL: No acute bony abnormality.      Impression    IMPRESSION:   1.  Cirrhotic morphology of the liver with evidence of portal hypertension and trace abdominopelvic free fluid. No definite drainable ascites at this time.  2.  Superimposed hepatic steatosis is nonspecific but given heterogeneity and subtle surrounding fat stranding, superimposed acute hepatitis is also possible.  3.  Likely cholelithiasis and/or sludge with some subtle fat stranding adjacent to gallbladder, could be related to #'s 1 and 2, but consider further evaluation with right upper quadrant ultrasound if there is clinical concern for acute cholecystitis. No   evidence of biliary obstruction.   US Abdomen Limited    Narrative    EXAM: US ABDOMEN LIMITED  LOCATION: [facility]  DATE: 7/8/2025    INDICATION: Right upper quadrant pain.  COMPARISON: None.  TECHNIQUE: Limited abdominal ultrasound.    FINDINGS:    GALLBLADDER: Small amount of sludge in the dependent portion of the gallbladder.    BILE DUCTS: No biliary dilatation. The common duct measures 3 mm.    LIVER: Increased echogenicity from diffuse fatty infiltration. No focal mass. The portal vein is patent with flow in the normal direction.    RIGHT KIDNEY: No hydronephrosis.    PANCREAS: The visualized portions are normal.    No ascites.      Impression    IMPRESSION:  1.  Few small stones versus sludge in the gallbladder. No wall thickening or free fluid to suggest acute cholecystitis.    2.  Hepatic steatosis.

## 2025-07-08 NOTE — ED NOTES
[facility]  ED Nurse Handoff Report    ED Chief complaint: Abdominal Pain  . ED Diagnosis:   Final diagnoses:   None       Allergies: No Known Allergies    Code Status: Full Code    Activity level - Baseline/Home:  independent.  Activity Level - Current:   standby.   Lift room needed: No.   Bariatric: No   Needed: No   Isolation: No.   Infection: Not Applicable.     Respiratory status: Room air    Vital Signs (within 30 minutes):   Vitals:    07/08/25 1505 07/08/25 1507 07/08/25 1640 07/08/25 1700   BP:  (!) 155/106 136/78 122/81   Pulse: (!) 123  107 105   Resp: 24   23   Temp: 98.5  F (36.9  C)      SpO2: 97%  94% 93%   Weight: 106.6 kg (235 lb)      Height: 1.829 m (6')          Cardiac Rhythm:  ,      Pain level:  5/10   Patient confused: No.   Patient Falls Risk: patient and family education.   Elimination Status: Has voided     Patient Report - Initial Complaint: Pt presents to ED with right sided abdominal pain. States the pain came on all of a sudden about 45 minutes ago. Denies urinary symptoms. Denies hx of abdominal surgery.   Focused Assessment: Ricci Brown is a 35 year old male with a past medical history significant for who presents to the ED via/accompanied by S/O with a chief complaint of right upper quadrant abdominal pain onset approximately 20 minutes prior to arrival while the patient was at work, nonradiating, worse with movement and deep breaths without specific alleviating factors.  Patient has chest pain, shortness of breath, vomiting although endorses nausea, changes in urination or bowel movements, previous abdominal surgeries or similar episodes previously     Abnormal Results:   Labs Ordered and Resulted from Time of ED Arrival to Time of ED Departure   COMPREHENSIVE METABOLIC PANEL - Abnormal       Result Value    Sodium 137      Potassium 4.3      Carbon Dioxide (CO2) 22      Anion Gap 18 (*)     Urea Nitrogen 5.3 (*)     Creatinine 0.72       GFR Estimate >90      Calcium 10.3      Chloride 97 (*)     Glucose 167 (*)     Alkaline Phosphatase 286 (*)      (*)     ALT 64      Protein Total 9.0 (*)     Albumin 4.4      Bilirubin Total 5.9 (*)    CBC WITH PLATELETS AND DIFFERENTIAL - Abnormal    WBC Count 4.5      RBC Count 4.23 (*)     Hemoglobin 14.7      Hematocrit 42.4            MCH 34.8 (*)     MCHC 34.7      RDW 13.0      Platelet Count 68 (*)     % Neutrophils 71      % Lymphocytes 22      % Monocytes 5      % Eosinophils 1      % Basophils 1      % Immature Granulocytes 0      NRBCs per 100 WBC 0      Absolute Neutrophils 3.2      Absolute Lymphocytes 1.0      Absolute Monocytes 0.2      Absolute Eosinophils 0.1      Absolute Basophils 0.0      Absolute Immature Granulocytes 0.0      Absolute NRBCs 0.0     ROUTINE UA WITH MICROSCOPIC REFLEX TO CULTURE - Abnormal    Color Urine Orange (*)     Appearance Urine Slightly Cloudy (*)     Glucose Urine 50 (*)     Bilirubin Urine Moderate (*)     Ketones Urine Negative      Specific Gravity Urine 1.034      Blood Urine Negative      pH Urine 5.5      Protein Albumin Urine 50 (*)     Urobilinogen Urine 3.0 (*)     Nitrite Urine Negative      Leukocyte Esterase Urine Negative      Bacteria Urine Few (*)     Mucus Urine Present (*)     RBC Urine 3 (*)     WBC Urine 8 (*)     Squamous Epithelials Urine 1      Hyaline Casts Urine 29 (*)    BILIRUBIN DIRECT - Abnormal    Bilirubin Direct 2.44 (*)    ETHANOL LEVEL BLOOD - Abnormal    Ethanol Level Blood 0.03 (*)    LIPASE - Normal    Lipase 38          CT Abdomen Pelvis w Contrast   Final Result   IMPRESSION:    1.  Cirrhotic morphology of the liver with evidence of portal hypertension and trace abdominopelvic free fluid. No definite drainable ascites at this time.   2.  Superimposed hepatic steatosis is nonspecific but given heterogeneity and subtle surrounding fat stranding, superimposed acute hepatitis is also possible.   3.  Likely cholelithiasis  and/or sludge with some subtle fat stranding adjacent to gallbladder, could be related to #'s 1 and 2, but consider further evaluation with right upper quadrant ultrasound if there is clinical concern for acute cholecystitis. No    evidence of biliary obstruction.      US Abdomen Limited    (Results Pending)       Treatments provided: Imaging, pain meds, labs, ...  see care plan.   Family Comments: S/O at bedside   OBS brochure/video discussed/provided to patient:  No  ED Medications:   Medications   ondansetron (ZOFRAN) injection 4 mg (4 mg Intravenous $Given 7/8/25 1612)   morphine (PF) injection 4 mg (4 mg Intravenous $Given 7/8/25 1755)   ketorolac (TORADOL) injection 15 mg (15 mg Intravenous $Given 7/8/25 1515)   lactated ringers BOLUS 1,000 mL (1,000 mLs Intravenous $New Bag 7/8/25 1611)   iopamidol (ISOVUE-370) solution 500 mL (100 mLs Intravenous $Given 7/8/25 1619)   sodium chloride 0.9 % bag for CT scan flush (65 mLs Intravenous $Given 7/8/25 1619)       Drips infusing:  No  For the majority of the shift this patient was Green.   Interventions performed were N/A    Sepsis treatment initiated: No    Cares/treatment/interventions/medications to be completed following ED care: See MAR     ED Nurse Name: Sukhi Mena  6:14 PM     RECEIVING UNIT ED HANDOFF REVIEW    Above ED Nurse Handoff Report was reviewed: Yes  Reviewed by: Xander Elliott RN on July 8, 2025 at 7:30 PM   JOSÉ Kolb called the ED to inform them the note was read: Yes

## 2025-07-08 NOTE — ED TRIAGE NOTES
Pt presents to ED with right sided abdominal pain. States the pain came on all of a sudden about 45 minutes ago. Denies urinary symptoms. Denies hx of abdominal surgery.

## 2025-07-09 ENCOUNTER — APPOINTMENT (OUTPATIENT)
Dept: MRI IMAGING | Facility: CLINIC | Age: 35
End: 2025-07-09
Attending: STUDENT IN AN ORGANIZED HEALTH CARE EDUCATION/TRAINING PROGRAM
Payer: COMMERCIAL

## 2025-07-09 ENCOUNTER — APPOINTMENT (OUTPATIENT)
Dept: NUCLEAR MEDICINE | Facility: CLINIC | Age: 35
End: 2025-07-09
Attending: INTERNAL MEDICINE
Payer: COMMERCIAL

## 2025-07-09 LAB
ALBUMIN SERPL BCG-MCNC: 3.8 G/DL (ref 3.5–5.2)
ALP SERPL-CCNC: 178 U/L (ref 40–150)
ALT SERPL W P-5'-P-CCNC: 41 U/L (ref 0–70)
ANION GAP SERPL CALCULATED.3IONS-SCNC: 15 MMOL/L (ref 7–15)
ANION GAP SERPL CALCULATED.3IONS-SCNC: 15 MMOL/L (ref 7–15)
ANION GAP SERPL CALCULATED.3IONS-SCNC: 17 MMOL/L (ref 7–15)
AST SERPL W P-5'-P-CCNC: 138 U/L (ref 0–45)
BILIRUB SERPL-MCNC: 8.7 MG/DL
BUN SERPL-MCNC: 15 MG/DL (ref 6–20)
BUN SERPL-MCNC: 19.9 MG/DL (ref 6–20)
BUN SERPL-MCNC: 22.9 MG/DL (ref 6–20)
CALCIUM SERPL-MCNC: 9.1 MG/DL (ref 8.8–10.4)
CALCIUM SERPL-MCNC: 9.3 MG/DL (ref 8.8–10.4)
CALCIUM SERPL-MCNC: 9.4 MG/DL (ref 8.8–10.4)
CHLORIDE SERPL-SCNC: 95 MMOL/L (ref 98–107)
CHLORIDE SERPL-SCNC: 99 MMOL/L (ref 98–107)
CHLORIDE SERPL-SCNC: 99 MMOL/L (ref 98–107)
CREAT SERPL-MCNC: 1.77 MG/DL (ref 0.67–1.17)
CREAT SERPL-MCNC: 1.84 MG/DL (ref 0.67–1.17)
CREAT SERPL-MCNC: 1.93 MG/DL (ref 0.67–1.17)
EGFRCR SERPLBLD CKD-EPI 2021: 45 ML/MIN/1.73M2
EGFRCR SERPLBLD CKD-EPI 2021: 48 ML/MIN/1.73M2
EGFRCR SERPLBLD CKD-EPI 2021: 50 ML/MIN/1.73M2
ERYTHROCYTE [DISTWIDTH] IN BLOOD BY AUTOMATED COUNT: 13.3 % (ref 10–15)
EST. AVERAGE GLUCOSE BLD GHB EST-MCNC: 123 MG/DL
GLUCOSE BLDC GLUCOMTR-MCNC: 132 MG/DL (ref 70–99)
GLUCOSE BLDC GLUCOMTR-MCNC: 179 MG/DL (ref 70–99)
GLUCOSE BLDC GLUCOMTR-MCNC: 227 MG/DL (ref 70–99)
GLUCOSE SERPL-MCNC: 115 MG/DL (ref 70–99)
GLUCOSE SERPL-MCNC: 131 MG/DL (ref 70–99)
GLUCOSE SERPL-MCNC: 188 MG/DL (ref 70–99)
HAV IGM SERPL QL IA: NONREACTIVE
HBA1C MFR BLD: 5.9 %
HBV CORE IGM SERPL QL IA: NONREACTIVE
HBV SURFACE AB SERPL IA-ACNC: 26.7 M[IU]/ML
HBV SURFACE AB SERPL IA-ACNC: REACTIVE M[IU]/ML
HBV SURFACE AG SERPL QL IA: NONREACTIVE
HBV SURFACE AG SERPL QL IA: NONREACTIVE
HCO3 SERPL-SCNC: 20 MMOL/L (ref 22–29)
HCO3 SERPL-SCNC: 21 MMOL/L (ref 22–29)
HCO3 SERPL-SCNC: 23 MMOL/L (ref 22–29)
HCT VFR BLD AUTO: 39.7 % (ref 40–53)
HCV AB SERPL QL IA: NONREACTIVE
HCV AB SERPL QL IA: NONREACTIVE
HGB BLD-MCNC: 13.5 G/DL (ref 13.3–17.7)
IRON BINDING CAPACITY (ROCHE): 220 UG/DL (ref 240–430)
IRON SATN MFR SERPL: 20 % (ref 15–46)
IRON SERPL-MCNC: 45 UG/DL (ref 61–157)
MCH RBC QN AUTO: 35.2 PG (ref 26.5–33)
MCHC RBC AUTO-ENTMCNC: 34 G/DL (ref 31.5–36.5)
MCV RBC AUTO: 104 FL (ref 78–100)
PLATELET # BLD AUTO: 65 10E3/UL (ref 150–450)
POTASSIUM SERPL-SCNC: 4.7 MMOL/L (ref 3.4–5.3)
POTASSIUM SERPL-SCNC: 6 MMOL/L (ref 3.4–5.3)
POTASSIUM SERPL-SCNC: 6.2 MMOL/L (ref 3.4–5.3)
POTASSIUM SERPL-SCNC: 6.3 MMOL/L (ref 3.4–5.3)
PROT SERPL-MCNC: 7.5 G/DL (ref 6.4–8.3)
RBC # BLD AUTO: 3.83 10E6/UL (ref 4.4–5.9)
SODIUM SERPL-SCNC: 134 MMOL/L (ref 135–145)
SODIUM SERPL-SCNC: 135 MMOL/L (ref 135–145)
SODIUM SERPL-SCNC: 135 MMOL/L (ref 135–145)
WBC # BLD AUTO: 12.7 10E3/UL (ref 4–11)

## 2025-07-09 PROCEDURE — 74183 MRI ABD W/O CNTR FLWD CNTR: CPT

## 2025-07-09 PROCEDURE — 250N000009 HC RX 250: Performed by: INTERNAL MEDICINE

## 2025-07-09 PROCEDURE — 99233 SBSQ HOSP IP/OBS HIGH 50: CPT | Performed by: INTERNAL MEDICINE

## 2025-07-09 PROCEDURE — 80048 BASIC METABOLIC PNL TOTAL CA: CPT | Performed by: INTERNAL MEDICINE

## 2025-07-09 PROCEDURE — 250N000013 HC RX MED GY IP 250 OP 250 PS 637: Performed by: STUDENT IN AN ORGANIZED HEALTH CARE EDUCATION/TRAINING PROGRAM

## 2025-07-09 PROCEDURE — 120N000001 HC R&B MED SURG/OB

## 2025-07-09 PROCEDURE — 87340 HEPATITIS B SURFACE AG IA: CPT | Performed by: INTERNAL MEDICINE

## 2025-07-09 PROCEDURE — 250N000011 HC RX IP 250 OP 636: Performed by: STUDENT IN AN ORGANIZED HEALTH CARE EDUCATION/TRAINING PROGRAM

## 2025-07-09 PROCEDURE — 94640 AIRWAY INHALATION TREATMENT: CPT

## 2025-07-09 PROCEDURE — 250N000011 HC RX IP 250 OP 636: Performed by: INTERNAL MEDICINE

## 2025-07-09 PROCEDURE — 86706 HEP B SURFACE ANTIBODY: CPT | Performed by: INTERNAL MEDICINE

## 2025-07-09 PROCEDURE — A9585 GADOBUTROL INJECTION: HCPCS | Performed by: STUDENT IN AN ORGANIZED HEALTH CARE EDUCATION/TRAINING PROGRAM

## 2025-07-09 PROCEDURE — 255N000002 HC RX 255 OP 636: Performed by: STUDENT IN AN ORGANIZED HEALTH CARE EDUCATION/TRAINING PROGRAM

## 2025-07-09 PROCEDURE — 258N000001 HC RX 258: Performed by: INTERNAL MEDICINE

## 2025-07-09 PROCEDURE — 250N000012 HC RX MED GY IP 250 OP 636 PS 637: Performed by: INTERNAL MEDICINE

## 2025-07-09 PROCEDURE — 999N000157 HC STATISTIC RCP TIME EA 10 MIN

## 2025-07-09 PROCEDURE — 36415 COLL VENOUS BLD VENIPUNCTURE: CPT | Performed by: STUDENT IN AN ORGANIZED HEALTH CARE EDUCATION/TRAINING PROGRAM

## 2025-07-09 PROCEDURE — 78227 HEPATOBIL SYST IMAGE W/DRUG: CPT

## 2025-07-09 PROCEDURE — 250N000013 HC RX MED GY IP 250 OP 250 PS 637: Performed by: INTERNAL MEDICINE

## 2025-07-09 PROCEDURE — 250N000011 HC RX IP 250 OP 636: Performed by: EMERGENCY MEDICINE

## 2025-07-09 PROCEDURE — 85027 COMPLETE CBC AUTOMATED: CPT | Performed by: STUDENT IN AN ORGANIZED HEALTH CARE EDUCATION/TRAINING PROGRAM

## 2025-07-09 PROCEDURE — 36415 COLL VENOUS BLD VENIPUNCTURE: CPT | Performed by: INTERNAL MEDICINE

## 2025-07-09 PROCEDURE — A9537 TC99M MEBROFENIN: HCPCS | Performed by: INTERNAL MEDICINE

## 2025-07-09 PROCEDURE — 83036 HEMOGLOBIN GLYCOSYLATED A1C: CPT | Performed by: INTERNAL MEDICINE

## 2025-07-09 PROCEDURE — 258N000003 HC RX IP 258 OP 636: Performed by: STUDENT IN AN ORGANIZED HEALTH CARE EDUCATION/TRAINING PROGRAM

## 2025-07-09 PROCEDURE — 82310 ASSAY OF CALCIUM: CPT | Performed by: STUDENT IN AN ORGANIZED HEALTH CARE EDUCATION/TRAINING PROGRAM

## 2025-07-09 PROCEDURE — 343N000001 HC RX 343 MED OP 636: Performed by: INTERNAL MEDICINE

## 2025-07-09 PROCEDURE — 83550 IRON BINDING TEST: CPT | Performed by: INTERNAL MEDICINE

## 2025-07-09 PROCEDURE — 258N000003 HC RX IP 258 OP 636: Performed by: INTERNAL MEDICINE

## 2025-07-09 PROCEDURE — 86803 HEPATITIS C AB TEST: CPT | Performed by: INTERNAL MEDICINE

## 2025-07-09 PROCEDURE — 258N000002 HC RX IP 258 OP 250: Performed by: INTERNAL MEDICINE

## 2025-07-09 PROCEDURE — 82310 ASSAY OF CALCIUM: CPT | Performed by: INTERNAL MEDICINE

## 2025-07-09 RX ORDER — GABAPENTIN 300 MG/1
900 CAPSULE ORAL EVERY 8 HOURS
Status: COMPLETED | OUTPATIENT
Start: 2025-07-09 | End: 2025-07-12

## 2025-07-09 RX ORDER — ALBUTEROL SULFATE 5 MG/ML
10 SOLUTION RESPIRATORY (INHALATION) ONCE
Status: COMPLETED | OUTPATIENT
Start: 2025-07-09 | End: 2025-07-09

## 2025-07-09 RX ORDER — NALOXONE HYDROCHLORIDE 0.4 MG/ML
0.2 INJECTION, SOLUTION INTRAMUSCULAR; INTRAVENOUS; SUBCUTANEOUS
Status: ACTIVE | OUTPATIENT
Start: 2025-07-09

## 2025-07-09 RX ORDER — INDOMETHACIN 25 MG/1
100 CAPSULE ORAL ONCE
Status: COMPLETED | OUTPATIENT
Start: 2025-07-09 | End: 2025-07-09

## 2025-07-09 RX ORDER — DEXTROSE MONOHYDRATE 25 G/50ML
25 INJECTION, SOLUTION INTRAVENOUS ONCE
Status: COMPLETED | OUTPATIENT
Start: 2025-07-09 | End: 2025-07-09

## 2025-07-09 RX ORDER — NALOXONE HYDROCHLORIDE 0.4 MG/ML
0.4 INJECTION, SOLUTION INTRAMUSCULAR; INTRAVENOUS; SUBCUTANEOUS
Status: ACTIVE | OUTPATIENT
Start: 2025-07-09

## 2025-07-09 RX ORDER — THIAMINE HYDROCHLORIDE 100 MG/ML
100 INJECTION, SOLUTION INTRAMUSCULAR; INTRAVENOUS DAILY
Status: DISCONTINUED | OUTPATIENT
Start: 2025-07-09 | End: 2025-07-09

## 2025-07-09 RX ORDER — KIT FOR THE PREPARATION OF TECHNETIUM TC 99M MEBROFENIN 45 MG/10ML
6 INJECTION, POWDER, LYOPHILIZED, FOR SOLUTION INTRAVENOUS ONCE
Status: COMPLETED | OUTPATIENT
Start: 2025-07-09 | End: 2025-07-09

## 2025-07-09 RX ORDER — FOLIC ACID 1 MG/1
1 TABLET ORAL DAILY
Status: DISPENSED | OUTPATIENT
Start: 2025-07-10

## 2025-07-09 RX ORDER — PIPERACILLIN SODIUM, TAZOBACTAM SODIUM 3; .375 G/15ML; G/15ML
3.38 INJECTION, POWDER, LYOPHILIZED, FOR SOLUTION INTRAVENOUS EVERY 6 HOURS
Status: DISCONTINUED | OUTPATIENT
Start: 2025-07-09 | End: 2025-07-09

## 2025-07-09 RX ORDER — CALCIUM GLUCONATE 98 MG/ML
1 INJECTION, SOLUTION INTRAVENOUS ONCE
Status: COMPLETED | OUTPATIENT
Start: 2025-07-09 | End: 2025-07-09

## 2025-07-09 RX ORDER — DIAZEPAM 5 MG/1
10 TABLET ORAL EVERY 30 MIN PRN
Status: ACTIVE | OUTPATIENT
Start: 2025-07-09

## 2025-07-09 RX ORDER — NICOTINE POLACRILEX 4 MG
15-30 LOZENGE BUCCAL
Status: ACTIVE | OUTPATIENT
Start: 2025-07-09

## 2025-07-09 RX ORDER — FLUMAZENIL 0.1 MG/ML
0.2 INJECTION, SOLUTION INTRAVENOUS
Status: ACTIVE | OUTPATIENT
Start: 2025-07-09

## 2025-07-09 RX ORDER — MORPHINE SULFATE 2 MG/ML
2 INJECTION, SOLUTION INTRAMUSCULAR; INTRAVENOUS ONCE
Status: COMPLETED | OUTPATIENT
Start: 2025-07-09 | End: 2025-07-09

## 2025-07-09 RX ORDER — NICOTINE POLACRILEX 4 MG
15-30 LOZENGE BUCCAL
Status: DISCONTINUED | OUTPATIENT
Start: 2025-07-09 | End: 2025-07-09

## 2025-07-09 RX ORDER — PANTOPRAZOLE SODIUM 40 MG/1
40 TABLET, DELAYED RELEASE ORAL
Status: DISCONTINUED | OUTPATIENT
Start: 2025-07-09 | End: 2025-07-17

## 2025-07-09 RX ORDER — FOLIC ACID 5 MG/ML
1 INJECTION, SOLUTION INTRAMUSCULAR; INTRAVENOUS; SUBCUTANEOUS DAILY
Status: DISCONTINUED | OUTPATIENT
Start: 2025-07-09 | End: 2025-07-09

## 2025-07-09 RX ORDER — SODIUM CHLORIDE 9 MG/ML
INJECTION, SOLUTION INTRAVENOUS CONTINUOUS
Status: DISCONTINUED | OUTPATIENT
Start: 2025-07-09 | End: 2025-07-09

## 2025-07-09 RX ORDER — DEXTROSE MONOHYDRATE 25 G/50ML
25-50 INJECTION, SOLUTION INTRAVENOUS
Status: DISCONTINUED | OUTPATIENT
Start: 2025-07-09 | End: 2025-07-09

## 2025-07-09 RX ORDER — GABAPENTIN 300 MG/1
300 CAPSULE ORAL EVERY 8 HOURS
Status: COMPLETED | OUTPATIENT
Start: 2025-07-14 | End: 2025-07-16

## 2025-07-09 RX ORDER — GABAPENTIN 300 MG/1
600 CAPSULE ORAL EVERY 8 HOURS
Status: COMPLETED | OUTPATIENT
Start: 2025-07-12 | End: 2025-07-14

## 2025-07-09 RX ORDER — DEXTROSE MONOHYDRATE 25 G/50ML
25-50 INJECTION, SOLUTION INTRAVENOUS
Status: ACTIVE | OUTPATIENT
Start: 2025-07-09

## 2025-07-09 RX ORDER — GABAPENTIN 100 MG/1
100 CAPSULE ORAL EVERY 8 HOURS
Status: DISPENSED | OUTPATIENT
Start: 2025-07-16 | End: 2025-07-19

## 2025-07-09 RX ORDER — GABAPENTIN 600 MG/1
1200 TABLET ORAL ONCE
Status: COMPLETED | OUTPATIENT
Start: 2025-07-09 | End: 2025-07-09

## 2025-07-09 RX ORDER — DIAZEPAM 10 MG/2ML
5-10 INJECTION, SOLUTION INTRAMUSCULAR; INTRAVENOUS EVERY 30 MIN PRN
Status: DISPENSED | OUTPATIENT
Start: 2025-07-09

## 2025-07-09 RX ORDER — GADOBUTROL 604.72 MG/ML
11 INJECTION INTRAVENOUS ONCE
Status: COMPLETED | OUTPATIENT
Start: 2025-07-09 | End: 2025-07-09

## 2025-07-09 RX ORDER — OLANZAPINE 5 MG/1
5-10 TABLET, ORALLY DISINTEGRATING ORAL EVERY 6 HOURS PRN
Status: ACTIVE | OUTPATIENT
Start: 2025-07-09

## 2025-07-09 RX ORDER — HALOPERIDOL 5 MG/ML
2.5-5 INJECTION INTRAMUSCULAR EVERY 6 HOURS PRN
Status: ACTIVE | OUTPATIENT
Start: 2025-07-09

## 2025-07-09 RX ORDER — HYDROMORPHONE HYDROCHLORIDE 1 MG/ML
0.3 INJECTION, SOLUTION INTRAMUSCULAR; INTRAVENOUS; SUBCUTANEOUS EVERY 4 HOURS PRN
Status: DISCONTINUED | OUTPATIENT
Start: 2025-07-09 | End: 2025-07-11

## 2025-07-09 RX ADMIN — PIPERACILLIN AND TAZOBACTAM 3.38 G: 3; .375 INJECTION, POWDER, FOR SOLUTION INTRAVENOUS at 12:30

## 2025-07-09 RX ADMIN — FOLIC ACID 1 MG: 5 INJECTION, SOLUTION INTRAMUSCULAR; INTRAVENOUS; SUBCUTANEOUS at 12:22

## 2025-07-09 RX ADMIN — CALCIUM GLUCONATE 1 G: 98 INJECTION, SOLUTION INTRAVENOUS at 17:18

## 2025-07-09 RX ADMIN — CALCIUM GLUCONATE 1 G: 98 INJECTION, SOLUTION INTRAVENOUS at 14:23

## 2025-07-09 RX ADMIN — SODIUM BICARBONATE: 84 INJECTION, SOLUTION INTRAVENOUS at 16:55

## 2025-07-09 RX ADMIN — GABAPENTIN 900 MG: 300 CAPSULE ORAL at 20:36

## 2025-07-09 RX ADMIN — MORPHINE SULFATE 4 MG: 4 INJECTION, SOLUTION INTRAMUSCULAR; INTRAVENOUS at 00:09

## 2025-07-09 RX ADMIN — DEXTROSE MONOHYDRATE 300 ML: 100 INJECTION, SOLUTION INTRAVENOUS at 14:12

## 2025-07-09 RX ADMIN — DEXTROSE MONOHYDRATE 25 G: 25 INJECTION, SOLUTION INTRAVENOUS at 17:31

## 2025-07-09 RX ADMIN — GABAPENTIN 1200 MG: 600 TABLET, FILM COATED ORAL at 05:28

## 2025-07-09 RX ADMIN — THIAMINE HYDROCHLORIDE 100 MG: 100 INJECTION, SOLUTION INTRAMUSCULAR; INTRAVENOUS at 09:13

## 2025-07-09 RX ADMIN — MORPHINE SULFATE 2 MG: 2 INJECTION, SOLUTION INTRAMUSCULAR; INTRAVENOUS at 11:03

## 2025-07-09 RX ADMIN — GABAPENTIN 900 MG: 300 CAPSULE ORAL at 12:30

## 2025-07-09 RX ADMIN — ALBUTEROL SULFATE 10 MG: 2.5 SOLUTION RESPIRATORY (INHALATION) at 17:04

## 2025-07-09 RX ADMIN — PIPERACILLIN AND TAZOBACTAM 3.38 G: 3; .375 INJECTION, POWDER, FOR SOLUTION INTRAVENOUS at 06:55

## 2025-07-09 RX ADMIN — SODIUM ZIRCONIUM CYCLOSILICATE 10 G: 10 POWDER, FOR SUSPENSION ORAL at 20:37

## 2025-07-09 RX ADMIN — PIPERACILLIN AND TAZOBACTAM 3.38 G: 3; .375 INJECTION, POWDER, FOR SOLUTION INTRAVENOUS at 01:08

## 2025-07-09 RX ADMIN — SODIUM CHLORIDE 10 UNITS: 9 INJECTION, SOLUTION INTRAVENOUS at 17:32

## 2025-07-09 RX ADMIN — DEXTROSE MONOHYDRATE 300 ML: 100 INJECTION, SOLUTION INTRAVENOUS at 16:48

## 2025-07-09 RX ADMIN — SODIUM BICARBONATE 100 MEQ: 84 INJECTION, SOLUTION INTRAVENOUS at 16:27

## 2025-07-09 RX ADMIN — MEBROFENIN 5 MILLICURIE: 45 INJECTION, POWDER, LYOPHILIZED, FOR SOLUTION INTRAVENOUS at 09:42

## 2025-07-09 RX ADMIN — PANTOPRAZOLE SODIUM 40 MG: 40 INJECTION, POWDER, FOR SOLUTION INTRAVENOUS at 12:23

## 2025-07-09 RX ADMIN — SODIUM ZIRCONIUM CYCLOSILICATE 10 G: 10 POWDER, FOR SUSPENSION ORAL at 16:16

## 2025-07-09 RX ADMIN — DEXTROSE MONOHYDRATE 25 G: 25 INJECTION, SOLUTION INTRAVENOUS at 14:38

## 2025-07-09 RX ADMIN — SODIUM CHLORIDE: 0.9 INJECTION, SOLUTION INTRAVENOUS at 01:08

## 2025-07-09 RX ADMIN — SODIUM CHLORIDE 10 UNITS: 9 INJECTION, SOLUTION INTRAVENOUS at 14:35

## 2025-07-09 RX ADMIN — GADOBUTROL 11 ML: 604.72 INJECTION INTRAVENOUS at 03:44

## 2025-07-09 ASSESSMENT — ACTIVITIES OF DAILY LIVING (ADL)
ADLS_ACUITY_SCORE: 20

## 2025-07-09 NOTE — CONSULTS
Gastroenterology Consultation      Ricci Brown MRN# [id number]   YOB: 1990 Age: 35 year old   Date of Admission: 2025     Reason for consult: I was asked by Dr Ludwig to evaluate this patient for RUQ pain and abnormal LFTs.               History of Present Illness:   This patient is a 35 year old male who presents with RUQ pain that began yesterday afternoon. Severe. When at its worse, is a stabbing pain in a focal area in the RUQ. Worse with movement. No other associated symptoms except feeling hot and sweaty with temp of 100.3. Drinks alcohol regularly, with more past few weeks. Ate breakfast yesterday without a problem, has not eaten since. Monday night had somewhat similar pain better after BM. Has stools daily, though yesterday had only a few small stools. CT shows cirrhosis with mild stranding of GB. MRI shows no vascular issues, no CBD obstruction, mild GB distension. RUQ ultrasound shows sludge in GB. LFTs elevated in 2024. An uncle  of alcoholic liver disease. He was placed on ABX and is better today.                Past Medical History:   No past medical history on file.          Past Surgical History:   No past surgical history on file.            Social History:     Social History     Socioeconomic History    Marital status:      Spouse name: Not on file    Number of children: Not on file    Years of education: Not on file    Highest education level: Not on file   Occupational History    Not on file   Tobacco Use    Smoking status: Not on file    Smokeless tobacco: Not on file   Substance and Sexual Activity    Alcohol use: Not on file    Drug use: Not on file    Sexual activity: Not on file   Other Topics Concern    Not on file   Social History Narrative    Not on file     Social Drivers of Health     Financial Resource Strain: Low Risk  (2025)    Financial Resource Strain     Within the past 12 months, have you or your family members you live with  been unable to get utilities (heat, electricity) when it was really needed?: No   Food Insecurity: Low Risk  (7/8/2025)    Food Insecurity     Within the past 12 months, did you worry that your food would run out before you got money to buy more?: No     Within the past 12 months, did the food you bought just not last and you didn t have money to get more?: No   Transportation Needs: Low Risk  (7/8/2025)    Transportation Needs     Within the past 12 months, has lack of transportation kept you from medical appointments, getting your medicines, non-medical meetings or appointments, work, or from getting things that you need?: No   Physical Activity: Not on file   Stress: Not on file   Social Connections: Unknown (1/1/2022)    Received from [facility] [employer] [facility]    Social Connections     Frequency of Communication with Friends and Family: Not on file   Interpersonal Safety: Low Risk  (7/8/2025)    Interpersonal Safety     Do you feel physically and emotionally safe where you currently live?: Yes     Within the past 12 months, have you been hit, slapped, kicked or otherwise physically hurt by someone?: No     Within the past 12 months, have you been humiliated or emotionally abused in other ways by your partner or ex-partner?: No   Housing Stability: Low Risk  (7/8/2025)    Housing Stability     Do you have housing? : Yes     Are you worried about losing your housing?: No             Family History:   No family history on file.          Allergies:    No Known Allergies          Medications:     No current outpatient medications on file.             Review of Systems:     10-point review of systems negative except as noted in HPI.            Physical Exam:   Vitals were reviewed  /71 (BP Location: Left arm)   Pulse 119   Temp 97.9  F (36.6  C) (Temporal)   Resp 20   Ht 1.829 m (6')   Wt 112.8 kg (248 lb 9.6 oz)   SpO2 93%   BMI 33.72 kg/m    Constitutional:   No distress     Heent:    "NC/AT, sclera anicteric     Neck:   No adenopathy, thyroid not palpable   Respiratory:   CTA anteriorly     Cardiovascular:   RRR, no murmur     Gastrointestinal:   Active BS, soft, NT to gentle palpation, mild distension and tympany     Extremities:   No clubbing, cyanosis or edema   Neuro:   Grossly nonfocal     Skin:   No rashes or ecchymoses   Psychiatry:        No evidence of anxiety or depression         Data:     ROUTINE IP LABS  BMP  Last Comprehensive Metabolic Panel:  Lab Results   Component Value Date     (L) 07/09/2025    POTASSIUM 6.2 (HH) 07/09/2025    CHLORIDE 99 07/09/2025    CO2 20 (L) 07/09/2025    ANIONGAP 15 07/09/2025     (H) 07/09/2025    BUN 15.0 07/09/2025    CR 1.77 (H) 07/09/2025    GFRESTIMATED 50 (L) 07/09/2025    REX 9.4 07/09/2025           CBC  Lab Results   Component Value Date    WBC 12.7 07/09/2025     Lab Results   Component Value Date    RBC 3.83 07/09/2025     Lab Results   Component Value Date    HGB 13.5 07/09/2025     Lab Results   Component Value Date    HCT 39.7 07/09/2025     No components found for: \"MCT\"  Lab Results   Component Value Date     07/09/2025     Lab Results   Component Value Date    MCH 35.2 07/09/2025     Lab Results   Component Value Date    MCHC 34.0 07/09/2025     Lab Results   Component Value Date    RDW 13.3 07/09/2025     Lab Results   Component Value Date    PLT 65 07/09/2025       INR  No results found for: \"INR\"    PANCREAS  Recent Labs   Lab 07/08/25  1512   LIPASE 38     LFT  Recent Labs   Lab 07/09/25  0811   PROTTOTAL 7.5   ALBUMIN 3.8   BILITOTAL 8.7*   ALKPHOS 178*   *   ALT 41                Assessment and Plan:   RUQ pain: I don't think this is alcoholic gastritis given worsening of symptoms with changing position, focal nature, worse with breathing. I think this is more related to inflammation, perhaps cholecystitis, less likely stretching of the liver capsule. The white count is elevated and the sudden onset is " more consistent with cholecystitis. Constipation also less likely. Will order a HIDA scan and start pantoprazole.    Cirrhosis: Will check hepatitis B and C, iron studies to look for contributing reasons for cirrhosis. Needs complete alcohol cessation.    Thirty minutes spent in patient care.     Jayme Dawson MD  [facility]  Office:  [phone]

## 2025-07-09 NOTE — PHARMACY-ADMISSION MEDICATION HISTORY
Pharmacist Admission Medication History    Admission medication history is complete. The information provided in this note is only as accurate as the sources available at the time of the update.    Information Source(s): Patient via phone    Pertinent Information: none    Changes made to PTA medication list:  Added: all  Deleted: oxycodone  Changed: None    Allergies reviewed with patient and updates made in EHR: yes    Medication History Completed By: Naty Aquino RPH 7/8/2025 8:12 PM    PTA Med List   Medication Sig Last Dose/Taking    diclofenac (VOLTAREN) 75 MG EC tablet Take 150 mg by mouth daily. 7/8/2025 Morning    MILK THISTLE PO Take 1 tablet by mouth daily. 7/8/2025 Morning    multivitamin, therapeutic (THERA-VIT) TABS tablet Take 1 tablet by mouth daily. 7/8/2025 Morning

## 2025-07-09 NOTE — PROGRESS NOTES
Arrived from ED around 1944. Transferred to the bed independently without difficulty with spouse. A&Ox4. VSS on RA. Oriented to room and call light system. IV SL. No skin concerns. Rates pain 5/10. RUQ abdominal tenderness. NPO.

## 2025-07-09 NOTE — PROGRESS NOTES
Case d/w Dr. Andrea.  Pt c hyperkalemia, cirrhosis.  He rec'd IV dye yday in the setting of being intravascularly dry, which is the likely cause of his ISABELLE c hyperkalemia.    Continue current med mgmt of hyperK.  Add NaHCO3 100 mEq x 1.  Change IVF to 1/2 NS + 75 bicarb.  Check BMP @ 2000.    Full consult tmrw.

## 2025-07-09 NOTE — PLAN OF CARE
"Goal Outcome Evaluation:      Plan of Care Reviewed With: patient, spouse    Overall Patient Progress: improvingOverall Patient Progress: improving    Outcome Evaluation: Pt is A&Ox4. VSS on RA. Temp of 100.3 F controlled, now 98.5 F. CIWAs: 0,0. LS Clear. No SOB. Denies nausea. Bowel sounds normoactive. RUQ abdominal pain and tenderness. LBM: 7/8. Independent. Ambulating. Voiding to the bathroom. IV abx. PIV L forearm running Normal Saline at 75mL/hr. NPO. Pain controlled with scheduled regimen and PRN IV morphine. CMS: intact. No skin concerns. Taken to imaging. Discharge plans TBD      Problem: Adult Inpatient Plan of Care  Goal: Plan of Care Review  Description: The Plan of Care Review/Shift note should be completed every shift.  The Outcome Evaluation is a brief statement about your assessment that the patient is improving, declining, or no change.  This information will be displayed automatically on your shift  note.  Outcome: Progressing  Flowsheets (Taken 7/9/2025 0307)  Outcome Evaluation: Pt is A&Ox4. VSS on RA. Temp of 100.3 F controlled, now 98.5 F. LS Clear. No SOB. Denies nausea. Bowel sounds normoactive. RUQ abdominal pain and tenderness. LBM: 7/8. Stand by assist. Ambulating. Voiding to the bathroom. IV abx. PIV L forearm running Normal Saline at 75mL/hr. NPO. Pain controlled with scheduled regimen and PRN IV morphine. CMS: intact. No skin concerns. Waiting on MRCP. Discharge plans TBD  Plan of Care Reviewed With:   patient   spouse  Overall Patient Progress: improving  Goal: Patient-Specific Goal (Individualized)  Description: You can add care plan individualizations to a care plan. Examples of Individualization might be:  \"Parent requests to be called daily at 9am for status\", \"I have a hard time hearing out of my right ear\", or \"Do not touch me to wake me up as it startles  me\".  Outcome: Progressing  Goal: Absence of Hospital-Acquired Illness or Injury  Outcome: Progressing  Intervention: " Identify and Manage Fall Risk  Recent Flowsheet Documentation  Taken 7/8/2025 1944 by Xander Munguia RN  Safety Promotion/Fall Prevention:   mobility aid in reach   nonskid shoes/slippers when out of bed   safety round/check completed  Intervention: Prevent Skin Injury  Recent Flowsheet Documentation  Taken 7/9/2025 0016 by Xander Munguia RN  Body Position: position changed independently  Taken 7/8/2025 1944 by Xander Munguia RN  Body Position: position changed independently  Skin Protection: adhesive use limited  Intervention: Prevent and Manage VTE (Venous Thromboembolism) Risk  Recent Flowsheet Documentation  Taken 7/8/2025 1944 by Xander Munguia RN  VTE Prevention/Management: (ambulating) SCDs off (sequential compression devices)  Intervention: Prevent Infection  Recent Flowsheet Documentation  Taken 7/8/2025 1944 by Xander Munguia RN  Infection Prevention:   rest/sleep promoted   hand hygiene promoted   single patient room provided  Goal: Optimal Comfort and Wellbeing  Outcome: Progressing  Intervention: Monitor Pain and Promote Comfort  Recent Flowsheet Documentation  Taken 7/9/2025 0030 by Xander Munguia RN  Pain Management Interventions: rest  Taken 7/9/2025 0008 by Xandre Munguia RN  Pain Management Interventions: medication (see MAR)  Taken 7/8/2025 1944 by Xander Munguia RN  Pain Management Interventions: rest  Goal: Readiness for Transition of Care  Outcome: Progressing  Intervention: Mutually Develop Transition Plan  Recent Flowsheet Documentation  Taken 7/8/2025 1959 by Xander Munguia RN  Equipment Currently Used at Home: none     Problem: Pain Acute  Goal: Optimal Pain Control and Function  Outcome: Progressing  Intervention: Optimize Psychosocial Wellbeing  Recent Flowsheet Documentation  Taken 7/8/2025 1944 by Xander Munguia RN  Diversional Activities: television  Intervention: Develop Pain Management Plan  Recent Flowsheet Documentation  Taken  7/9/2025 0030 by Xander Munguia, RN  Pain Management Interventions: rest  Taken 7/9/2025 0008 by Xander Munguia, RN  Pain Management Interventions: medication (see MAR)  Taken 7/8/2025 1944 by Xander Munguia, RN  Pain Management Interventions: rest  Intervention: Prevent or Manage Pain  Recent Flowsheet Documentation  Taken 7/8/2025 1944 by Xander Munguia, RN  Sensory Stimulation Regulation: care clustered  Bowel Elimination Promotion:   adequate fluid intake promoted   ambulation promoted  Medication Review/Management: medications reviewed

## 2025-07-09 NOTE — PROGRESS NOTES
[facility]    Medicine Progress Note - Hospitalist Service    Date of Admission:  7/8/2025    Assessment & Plan      Ricci Brown is a 35 year old male admitted on 7/8/2025. He has little past medical history who presents to the ED for evaluation of abdominal pain.   Terence stated that unfortunately he has been regular EtOH drinker at least for the last 10 to 15 years.    Addendum: 3:37 PM  I noted repeat metabolic panel earlier showing ISABELLE and persistent hyperkalemia  -Suspecting likely contrast related also in the setting of liver cirrhosis  - I will request for formal nephrology service input  - Continued on generous IV fluid support  - Repeat hyperkalemia care order set in place  - Albuterol nebulization, repeat calcium gluconate, insulin administration with dextrose  - Telemonitoring  - Bishop ordered earlier      Addendum: I was notified that HIDA scan was completed.  Negative findings for cholecystitis  - I will discontinue broad-spectrum antibiotics as started during admission process  - Patient is asking for hospital discharge.  However need to make sure that he truly does not have significant hyperkalemia as noted on his lab work this morning and concomitant ISABELLE.  I do have a pending repeat BMP.  If that is improved and no issues with that and he is still feeling better then potentially he can be safely discharged home.  However if there is an underlying issue then he will need to continue to be monitored in the hospital with continuation of medical care.      Problem list:    #Abdominal pain, jaundice  #Newly diagnosed possible liver cirrhosis  #Suspected cholecystitis, no clear evidence of choledocholithiasis  #Possible alcoholic gastritis  #Alcohol abuse, alcohol dependence    -Mr. Wynne remained at risk for clinical deterioration and will be benefiting for close monitoring and care under inpatient service  - Highly appreciate input from GI service.  Serology for  hepatitis sent  - HIDA scan as ordered and planned by GI service  - Earlier MRI study did not show any evidence of obstruction, mild gallbladder distention, earlier right upper quadrant ultrasound did show some sludge in the gallbladder  - He was treated with IV antibiotics overnight currently feeling improved  - If patient still having issues with cholecystitis then discussed with him that he likely will do mostly conservative medical approach with antibiotics for now given recent diagnosis of liver cirrhosis  - Continued on PPI   - Suspecting if he will need surgical evaluation or approach then will likely benefit for referrals at the [facility] given concomitant suspected liver cirrhosis  - May resume diet and likely can tolerate regular diet once HIDA scan is done.    #?  Acute kidney injury  #?  Hyperkalemia  -Patient demonstrated increased on creatinine levels and hyperkalemia on this morning labs  - He did not receive any KCl supplementation overnight  - Recheck BMP now prior to initiating any hyperkalemia regimen  - Continue IV fluids given possible ISABELLE if this is a true findings with this most recent blood works        Alcohol use  High risk for alcohol withdrawals  Reports drinking 3-4 hard liquor drinks each day. Denies ever going through withdrawal. Does have some tachycardia, may be related to above issues but will monitor on CIWA with symptom triggered benzos for now.   - CIWA monitoring with symptom triggered benzodiazepines  - Gabapentin taper  - Thiamine and folate replacement  Extensive discussion ensued regarding the need and importance of complete EtOH cessation in the setting of this findings and diagnosis of liver cirrhosis, hepatitis, jaundice and now with concomitant kidney injury          Diet: NPO for Procedure/Surgery per Anesthesia Guidelines Except for: Meds; Clear liquids before procedure/surgery: ADULT (Age GREATER than or Equal to 18 years) - Clear liquids 2 hours before  procedure/surgery    DVT Prophylaxis: Pneumatic Compression Devices and Ambulate every shift  Starr Catheter: Not present  Lines: None     Cardiac Monitoring: ACTIVE order. Indication: Tachyarrhythmias, acute (48 hours)  Code Status: Full Code      Clinically Significant Risk Factors Present on Admission        # Hyperkalemia: Highest K = 6.2 mmol/L in last 2 days, will monitor as appropriate  # Hyponatremia: Lowest Na = 134 mmol/L in last 2 days, will monitor as appropriate  # Hypochloremia: Lowest Cl = 97 mmol/L in last 2 days, will monitor as appropriate        # Thrombocytopenia: Lowest platelets = 65 in last 2 days, will monitor for bleeding  # Acute Kidney Injury, unspecified: based on a >150% or 0.3 mg/dL increase in last creatinine compared to past 90 day average, will monitor renal function            # Obesity: Estimated body mass index is 33.72 kg/m  as calculated from the following:    Height as of this encounter: 1.829 m (6').    Weight as of this encounter: 112.8 kg (248 lb 9.6 oz).              Social Drivers of Health     Received from [employer] [facility]    Social Connections          Disposition Plan     Medically Ready for Discharge: Anticipated Tomorrow if continues to show improvement with pain level, tolerance oral diet and resolution of current hyperkalemia and ISABELLE             Al Bandar Andrea MD, MD  Hospitalist Service  [facility]  Securely message with [email or web address] (more info)  Text page via [facility] Paging/Directory   ______________________________________________________________________    Interval History   I assume medicine service care today.  I met this pleasant young gentleman this morning together with his wife at bedside.  Fortunately he is demonstrating stable hemodynamics.  He thinks that he is improved and better than yesterday with no complaints of severe uncontrolled abdominal pain.  He is endorsing no nausea, vomiting.  No reported  bleeding tendencies.  Afebrile.  Currently not requiring oxygen support.  As [id number] he is requesting for hospital discharge today but discussed with him current working plan, diagnosis and need for close monitoring and care for now.  No reported alteration in mental state.      Physical Exam   Vital Signs: Temp: 97.9  F (36.6  C) Temp src: Temporal BP: 108/83 Pulse: (!) 122   Resp: 18 SpO2: 94 % O2 Device: None (Room air)    Weight: 248 lbs 9.6 oz    HEENT; Atraumatic, normocephalic, pinkish conjuctiva, pupils bilateral reactive   Icteric sclerae  Skin: warm and moist, no rashes  Jaundice  Lymphatics: no cervical or axillary lymphandenopathy  Lungs: equal chest expansion, clear to auscultation, no wheezes, no stridor, no crackles,   Heart: normal rate, normal rhythm, no rubs or gallops.   Abdomen: normal bowel sounds, no guarding, no peritoneal signs, some mild tenderness upon palpation of the right upper quadrant  Extremities: no deformities, no edema   Neuro; follow commands, alert and oriented x3, spontaneous speech, coherent, moves all extremities spontaneously  Psych; no hallucination, euthymic mood, not agitated      Medical Decision Making       50 MINUTES SPENT BY ME on the date of service doing chart review, history, exam, documentation & further activities per the note.  MANAGEMENT DISCUSSED with the following over the past 24 hours: Yes   NOTE(S)/MEDICAL RECORDS REVIEWED over the past 24 hours: Yes      Data     I have personally reviewed the following data over the past 24 hrs:    12.7 (H)  \   13.5   / 65 (L)     134 (L) 99 15.0 /  131 (H)   6.2 (HH) 20 (L) 1.77 (H) \     ALT: 41 AST: 138 (H) AP: 178 (H) TBILI: 8.7 (H)   ALB: 3.8 TOT PROTEIN: 7.5 LIPASE: 38       Imaging results reviewed over the past 24 hrs:   Recent Results (from the past 24 hours)   CT Abdomen Pelvis w Contrast    Narrative    EXAM: CT ABDOMEN PELVIS W CONTRAST  LOCATION: [facility]  DATE:  7/8/2025    INDICATION: Right upper quadrant, R mid abd pain and tenderness  COMPARISON: None.  TECHNIQUE: CT scan of the abdomen and pelvis was performed following injection of IV contrast. Multiplanar reformats were obtained. Dose reduction techniques were used.  CONTRAST: 100mL Isovue 370    FINDINGS:   LOWER CHEST: No focal airspace consolidation or pleural effusion.    HEPATOBILIARY: Nodular contour of liver with widening of the fissures. Diffuse hepatic steatosis. Possible cholelithiasis and/or sludge with some subtle surrounding fat stranding but no definite wall thickening. No evidence of mercedes biliary obstruction.    PANCREAS: Normal.    SPLEEN: Splenomegaly. No focal lesion visualized.    ADRENAL GLANDS: Normal.    KIDNEYS/BLADDER: No hydronephrosis. Urinary bladder is decompressed but otherwise unremarkable.    BOWEL: No obstruction or inflammatory change. Normal appendix.    LYMPH NODES: No suspicious lymphadenopathy. Small volume abdominopelvic free fluid without drainable ascites.    VASCULATURE: Normal.    PELVIC ORGANS: Normal.    MUSCULOSKELETAL: No acute bony abnormality.      Impression    IMPRESSION:   1.  Cirrhotic morphology of the liver with evidence of portal hypertension and trace abdominopelvic free fluid. No definite drainable ascites at this time.  2.  Superimposed hepatic steatosis is nonspecific but given heterogeneity and subtle surrounding fat stranding, superimposed acute hepatitis is also possible.  3.  Likely cholelithiasis and/or sludge with some subtle fat stranding adjacent to gallbladder, could be related to #'s 1 and 2, but consider further evaluation with right upper quadrant ultrasound if there is clinical concern for acute cholecystitis. No   evidence of biliary obstruction.   US Abdomen Limited    Narrative    EXAM: US ABDOMEN LIMITED  LOCATION: [facility]  DATE: 7/8/2025    INDICATION: Right upper quadrant pain.  COMPARISON: None.  TECHNIQUE:  "Limited abdominal ultrasound.    FINDINGS:    GALLBLADDER: Small amount of sludge in the dependent portion of the gallbladder.    BILE DUCTS: No biliary dilatation. The common duct measures 3 mm.    LIVER: Increased echogenicity from diffuse fatty infiltration. No focal mass. The portal vein is patent with flow in the normal direction.    RIGHT KIDNEY: No hydronephrosis.    PANCREAS: The visualized portions are normal.    No ascites.      Impression    IMPRESSION:  1.  Few small stones versus sludge in the gallbladder. No wall thickening or free fluid to suggest acute cholecystitis.    2.  Hepatic steatosis.       MR Abdomen MRCP w/o & w Contrast    Narrative    EXAM: MR ABDOMEN MRCP W/O and W CONTRAST  LOCATION: [facility]  DATE: 7/9/2025    INDICATION: RUQ pain, elevated liver enzymes  COMPARISON: CT abdomen and pelvis from 07/08/2025.  TECHNIQUE: Routine MR liver/pancreas protocol including axial and coronal MRCP sequences. 2D and 3D reconstruction performed by MR technologist including MIP reconstruction and slab cholangiograms. If performed with contrast, additional dynamic T1 post   IV contrast images.  CONTRAST: 11 mL Gadavist     FINDINGS:     MRCP: Normal common bile duct caliber (5 mm) with no intrahepatic bile duct dilatation or \"filling defect\" to suggest choledocholithiasis. Nondilated main pancreatic duct. Cholelithiasis in a mildly distended gallbladder without evidence of gallbladder   wall thickening or significant focal pericholecystic edema.    LIVER: Moderate degree of diffuse hepatic steatosis with hepatomegaly (24 cm craniocaudal) and cirrhotic morphology given subtle nodular hepatic contour, widened hepatic fissure, and enlarged caudate left lateral lobes. No discrete liver lesion. Normal   hepatic and portal veins. No intrahepatic bile duct dilatation. Small volume perihepatic predominant ascites.    PANCREAS: Normal.    ADDITIONAL FINDINGS: Splenomegaly. Normal adrenal " glands and kidneys.      Impression    IMPRESSION:  1.  Cirrhosis with small volume ascites.  2.  No biliary dilatation or choledocholithiasis.  3.  Cholelithiasis.

## 2025-07-10 ENCOUNTER — APPOINTMENT (OUTPATIENT)
Dept: ULTRASOUND IMAGING | Facility: CLINIC | Age: 35
End: 2025-07-10
Attending: PHYSICIAN ASSISTANT
Payer: COMMERCIAL

## 2025-07-10 ENCOUNTER — APPOINTMENT (OUTPATIENT)
Dept: ULTRASOUND IMAGING | Facility: CLINIC | Age: 35
End: 2025-07-10
Attending: INTERNAL MEDICINE
Payer: COMMERCIAL

## 2025-07-10 VITALS
WEIGHT: 255.07 LBS | OXYGEN SATURATION: 97 % | TEMPERATURE: 98.4 F | DIASTOLIC BLOOD PRESSURE: 78 MMHG | SYSTOLIC BLOOD PRESSURE: 122 MMHG | RESPIRATION RATE: 20 BRPM | HEIGHT: 72 IN | BODY MASS INDEX: 34.55 KG/M2 | HEART RATE: 119 BPM

## 2025-07-10 LAB
ALBUMIN BODY FLUID SOURCE: NORMAL
ALBUMIN FLD-MCNC: 2.4 G/DL
ALBUMIN SERPL BCG-MCNC: 3.4 G/DL (ref 3.5–5.2)
ALBUMIN UR-MCNC: 30 MG/DL
ALP SERPL-CCNC: 108 U/L (ref 40–150)
ALT SERPL W P-5'-P-CCNC: 35 U/L (ref 0–70)
ANION GAP SERPL CALCULATED.3IONS-SCNC: 14 MMOL/L (ref 7–15)
APPEARANCE FLD: ABNORMAL
APPEARANCE UR: CLEAR
AST SERPL W P-5'-P-CCNC: 97 U/L (ref 0–45)
BACTERIA SPEC CULT: NORMAL
BACTERIA SPEC CULT: NORMAL
BILIRUB SERPL-MCNC: 12.2 MG/DL
BILIRUB UR QL STRIP: ABNORMAL
BUN SERPL-MCNC: 27.6 MG/DL (ref 6–20)
CALCIUM SERPL-MCNC: 9 MG/DL (ref 8.8–10.4)
CHLORIDE SERPL-SCNC: 93 MMOL/L (ref 98–107)
COLOR FLD: ABNORMAL
COLOR UR AUTO: ABNORMAL
CREAT SERPL-MCNC: 1.81 MG/DL (ref 0.67–1.17)
EGFRCR SERPLBLD CKD-EPI 2021: 49 ML/MIN/1.73M2
ERYTHROCYTE [DISTWIDTH] IN BLOOD BY AUTOMATED COUNT: 13.7 % (ref 10–15)
GLUCOSE BLDC GLUCOMTR-MCNC: 129 MG/DL (ref 70–99)
GLUCOSE BLDC GLUCOMTR-MCNC: 133 MG/DL (ref 70–99)
GLUCOSE BLDC GLUCOMTR-MCNC: 135 MG/DL (ref 70–99)
GLUCOSE BLDC GLUCOMTR-MCNC: 140 MG/DL (ref 70–99)
GLUCOSE SERPL-MCNC: 121 MG/DL (ref 70–99)
GLUCOSE UR STRIP-MCNC: NEGATIVE MG/DL
GRAM STAIN RESULT: NORMAL
GRAM STAIN RESULT: NORMAL
HCO3 SERPL-SCNC: 26 MMOL/L (ref 22–29)
HCT VFR BLD AUTO: 35.3 % (ref 40–53)
HGB BLD-MCNC: 12 G/DL (ref 13.3–17.7)
HGB UR QL STRIP: NEGATIVE
HYALINE CASTS: 1 /LPF
KETONES UR STRIP-MCNC: NEGATIVE MG/DL
LACTATE SERPL-SCNC: 2.2 MMOL/L (ref 0.7–2)
LACTATE SERPL-SCNC: 3.1 MMOL/L (ref 0.7–2)
LEUKOCYTE ESTERASE UR QL STRIP: NEGATIVE
LYMPHOCYTES NFR FLD MANUAL: 0 %
MCH RBC QN AUTO: 35.2 PG (ref 26.5–33)
MCHC RBC AUTO-ENTMCNC: 34 G/DL (ref 31.5–36.5)
MCV RBC AUTO: 104 FL (ref 78–100)
MONOS+MACROS NFR FLD MANUAL: 3 %
MUCOUS THREADS #/AREA URNS LPF: PRESENT /LPF
NEUTROPHILS # FLD: ABNORMAL /UL (ref ?–250)
NEUTS BAND NFR FLD MANUAL: 97 %
NITRATE UR QL: NEGATIVE
PH UR STRIP: 8.5 [PH] (ref 5–7)
PLATELET # BLD AUTO: 69 10E3/UL (ref 150–450)
POTASSIUM SERPL-SCNC: 4.7 MMOL/L (ref 3.4–5.3)
PROCALCITONIN SERPL IA-MCNC: 14.56 NG/ML
PROT SERPL-MCNC: 7.2 G/DL (ref 6.4–8.3)
RBC # BLD AUTO: 3.41 10E6/UL (ref 4.4–5.9)
RBC URINE: 1 /HPF
SODIUM SERPL-SCNC: 133 MMOL/L (ref 135–145)
SP GR UR STRIP: 1.02 (ref 1–1.03)
SPECIMEN SOURCE FLD: ABNORMAL
SQUAMOUS EPITHELIAL: <1 /HPF
UROBILINOGEN UR STRIP-MCNC: >12 MG/DL
WBC # BLD AUTO: 10.4 10E3/UL (ref 4–11)
WBC # FLD AUTO: ABNORMAL /UL
WBC URINE: 4 /HPF

## 2025-07-10 PROCEDURE — 80053 COMPREHEN METABOLIC PANEL: CPT | Performed by: INTERNAL MEDICINE

## 2025-07-10 PROCEDURE — 83605 ASSAY OF LACTIC ACID: CPT | Performed by: INTERNAL MEDICINE

## 2025-07-10 PROCEDURE — 81003 URINALYSIS AUTO W/O SCOPE: CPT | Performed by: INTERNAL MEDICINE

## 2025-07-10 PROCEDURE — 250N000013 HC RX MED GY IP 250 OP 250 PS 637: Performed by: STUDENT IN AN ORGANIZED HEALTH CARE EDUCATION/TRAINING PROGRAM

## 2025-07-10 PROCEDURE — 99255 IP/OBS CONSLTJ NEW/EST HI 80: CPT | Performed by: INTERNAL MEDICINE

## 2025-07-10 PROCEDURE — 85014 HEMATOCRIT: CPT | Performed by: INTERNAL MEDICINE

## 2025-07-10 PROCEDURE — 250N000011 HC RX IP 250 OP 636: Mod: JW | Performed by: STUDENT IN AN ORGANIZED HEALTH CARE EDUCATION/TRAINING PROGRAM

## 2025-07-10 PROCEDURE — 250N000009 HC RX 250: Performed by: INTERNAL MEDICINE

## 2025-07-10 PROCEDURE — 120N000001 HC R&B MED SURG/OB

## 2025-07-10 PROCEDURE — 272N000706 US PARACENTESIS WITHOUT ALBUMIN

## 2025-07-10 PROCEDURE — 84145 PROCALCITONIN (PCT): CPT | Performed by: INTERNAL MEDICINE

## 2025-07-10 PROCEDURE — 87075 CULTR BACTERIA EXCEPT BLOOD: CPT | Performed by: INTERNAL MEDICINE

## 2025-07-10 PROCEDURE — 250N000013 HC RX MED GY IP 250 OP 250 PS 637: Performed by: INTERNAL MEDICINE

## 2025-07-10 PROCEDURE — 87070 CULTURE OTHR SPECIMN AEROBIC: CPT | Performed by: INTERNAL MEDICINE

## 2025-07-10 PROCEDURE — 89050 BODY FLUID CELL COUNT: CPT | Performed by: INTERNAL MEDICINE

## 2025-07-10 PROCEDURE — 258N000002 HC RX IP 258 OP 250: Performed by: INTERNAL MEDICINE

## 2025-07-10 PROCEDURE — 82042 OTHER SOURCE ALBUMIN QUAN EA: CPT | Performed by: INTERNAL MEDICINE

## 2025-07-10 PROCEDURE — 36415 COLL VENOUS BLD VENIPUNCTURE: CPT | Performed by: INTERNAL MEDICINE

## 2025-07-10 PROCEDURE — 87040 BLOOD CULTURE FOR BACTERIA: CPT | Performed by: INTERNAL MEDICINE

## 2025-07-10 PROCEDURE — 250N000011 HC RX IP 250 OP 636: Performed by: INTERNAL MEDICINE

## 2025-07-10 PROCEDURE — 99233 SBSQ HOSP IP/OBS HIGH 50: CPT | Performed by: INTERNAL MEDICINE

## 2025-07-10 PROCEDURE — 76705 ECHO EXAM OF ABDOMEN: CPT

## 2025-07-10 RX ORDER — DOCUSATE SODIUM 100 MG/1
100 CAPSULE, LIQUID FILLED ORAL 2 TIMES DAILY
Status: DISPENSED | OUTPATIENT
Start: 2025-07-10

## 2025-07-10 RX ORDER — POLYETHYLENE GLYCOL 3350 17 G/17G
17 POWDER, FOR SOLUTION ORAL DAILY
Status: DISPENSED | OUTPATIENT
Start: 2025-07-10

## 2025-07-10 RX ORDER — FEXOFENADINE HCL 60 MG/1
60 TABLET, FILM COATED ORAL DAILY
Status: DISPENSED | OUTPATIENT
Start: 2025-07-10

## 2025-07-10 RX ORDER — CEFTRIAXONE 2 G/1
2 INJECTION, POWDER, FOR SOLUTION INTRAMUSCULAR; INTRAVENOUS EVERY 24 HOURS
Status: DISPENSED | OUTPATIENT
Start: 2025-07-10

## 2025-07-10 RX ADMIN — SODIUM BICARBONATE: 84 INJECTION, SOLUTION INTRAVENOUS at 11:16

## 2025-07-10 RX ADMIN — FOLIC ACID 1 MG: 1 TABLET ORAL at 08:16

## 2025-07-10 RX ADMIN — POLYETHYLENE GLYCOL 3350 17 G: 17 POWDER, FOR SOLUTION ORAL at 14:08

## 2025-07-10 RX ADMIN — SENNOSIDES AND DOCUSATE SODIUM 2 TABLET: 8.6; 5 TABLET ORAL at 08:29

## 2025-07-10 RX ADMIN — THIAMINE HCL TAB 100 MG 100 MG: 100 TAB at 08:16

## 2025-07-10 RX ADMIN — DOCUSATE SODIUM 100 MG: 100 CAPSULE, LIQUID FILLED ORAL at 20:09

## 2025-07-10 RX ADMIN — GABAPENTIN 900 MG: 300 CAPSULE ORAL at 15:04

## 2025-07-10 RX ADMIN — FEXOFENADINE HYDROCHLORIDE 60 MG: 60 TABLET ORAL at 15:05

## 2025-07-10 RX ADMIN — PANTOPRAZOLE SODIUM 40 MG: 40 TABLET, DELAYED RELEASE ORAL at 16:50

## 2025-07-10 RX ADMIN — HYDROMORPHONE HYDROCHLORIDE 0.3 MG: 1 INJECTION, SOLUTION INTRAMUSCULAR; INTRAVENOUS; SUBCUTANEOUS at 13:33

## 2025-07-10 RX ADMIN — GABAPENTIN 900 MG: 300 CAPSULE ORAL at 07:00

## 2025-07-10 RX ADMIN — SODIUM ZIRCONIUM CYCLOSILICATE 10 G: 10 POWDER, FOR SUSPENSION ORAL at 08:17

## 2025-07-10 RX ADMIN — CEFTRIAXONE 2 G: 2 INJECTION, POWDER, FOR SOLUTION INTRAMUSCULAR; INTRAVENOUS at 08:32

## 2025-07-10 RX ADMIN — PANTOPRAZOLE SODIUM 40 MG: 40 TABLET, DELAYED RELEASE ORAL at 07:00

## 2025-07-10 ASSESSMENT — ACTIVITIES OF DAILY LIVING (ADL)
ADLS_ACUITY_SCORE: 21
ADLS_ACUITY_SCORE: 20
ADLS_ACUITY_SCORE: 20
ADLS_ACUITY_SCORE: 21
ADLS_ACUITY_SCORE: 20
ADLS_ACUITY_SCORE: 21
ADLS_ACUITY_SCORE: 21
ADLS_ACUITY_SCORE: 20
ADLS_ACUITY_SCORE: 21
ADLS_ACUITY_SCORE: 20
ADLS_ACUITY_SCORE: 21
ADLS_ACUITY_SCORE: 20

## 2025-07-10 NOTE — PLAN OF CARE
"High K+ max 6.3 monitor recheck at 2000 tonight, Nephrology Consulted.  HIDA scanned today, GI following.  Afebrile.  Remote tele, tachy HRs.   CIWA max #s 3.  Pain managed with rest/repositioning, declined need for PRN pain med.  No nausea, tolerating diet.  LS clear bilat., RA.  CMS+.  Up ad cristine., ambulating.  Voiding.      Goal Outcome Evaluation:    Plan of Care Reviewed With: patient, spouse    Overall Patient Progress: no changeOverall Patient Progress: no change    Outcome Evaluation: HIDA scan today, GI following.  High K+ max 6.3, monitor recheck at 2000 tonight after sched. meds.    Problem: Adult Inpatient Plan of Care  Goal: Plan of Care Review  Description: The Plan of Care Review/Shift note should be completed every shift.  The Outcome Evaluation is a brief statement about your assessment that the patient is improving, declining, or no change.  This information will be displayed automatically on your shift  note.  Outcome: Progressing  Flowsheets (Taken 7/9/2025 1949)  Outcome Evaluation: HIDA scan today, GI following.  High K+ max 6.3, monitor recheck at 2000 tonight after sched. meds.  Plan of Care Reviewed With:   patient   spouse  Overall Patient Progress: no change  Goal: Patient-Specific Goal (Individualized)  Description: You can add care plan individualizations to a care plan. Examples of Individualization might be:  \"Parent requests to be called daily at 9am for status\", \"I have a hard time hearing out of my right ear\", or \"Do not touch me to wake me up as it startles  me\".  Outcome: Progressing  Goal: Absence of Hospital-Acquired Illness or Injury  Outcome: Progressing  Intervention: Identify and Manage Fall Risk  Recent Flowsheet Documentation  Taken 7/9/2025 0910 by Franco Cruz, RN  Safety Promotion/Fall Prevention:   room organization consistent   safety round/check completed   patient and family education   nonskid shoes/slippers when out of bed   lighting adjusted   clutter free " environment maintained   assistive device/personal items within reach  Intervention: Prevent Skin Injury  Recent Flowsheet Documentation  Taken 7/9/2025 1830 by Franco Cruz RN  Body Position: position changed independently  Taken 7/9/2025 1730 by Franco Cruz RN  Body Position: position changed independently  Taken 7/9/2025 1411 by Franco Cruz RN  Body Position:   position changed independently   supine, head elevated  Taken 7/9/2025 1230 by Franco Cruz RN  Body Position: position changed independently  Taken 7/9/2025 1150 by Franco Cruz RN  Body Position: position changed independently  Taken 7/9/2025 0910 by Franco Cruz RN  Body Position: position changed independently  Skin Protection:   adhesive use limited   incontinence pads utilized   pulse oximeter probe site changed   tubing/devices free from skin contact  Intervention: Prevent and Manage VTE (Venous Thromboembolism) Risk  Recent Flowsheet Documentation  Taken 7/9/2025 0910 by Franco Cruz RN  VTE Prevention/Management: (up ad cristine ambulating often) patient refused intervention  Intervention: Prevent Infection  Recent Flowsheet Documentation  Taken 7/9/2025 0910 by Franco Cruz RN  Infection Prevention:   hand hygiene promoted   equipment surfaces disinfected   environmental surveillance performed   rest/sleep promoted  Goal: Optimal Comfort and Wellbeing  Outcome: Progressing  Intervention: Monitor Pain and Promote Comfort  Recent Flowsheet Documentation  Taken 7/9/2025 1830 by Franco Cruz RN  Pain Management Interventions: rest  Taken 7/9/2025 1800 by Franco Cruz RN  Pain Management Interventions: medication offered but refused  Taken 7/9/2025 1730 by Franco Cruz RN  Pain Management Interventions: rest  Taken 7/9/2025 1612 by Franco Cruz RN  Pain Management Interventions: rest  Taken 7/9/2025 1411 by Franco Cruz RN  Pain Management Interventions: rest  Taken 7/9/2025 1230 by Franco Cruz RN  Pain Management  Interventions: rest  Taken 7/9/2025 1150 by Franco Cruz RN  Pain Management Interventions: rest  Taken 7/9/2025 0910 by Franco Cruz RN  Pain Management Interventions: pain management plan reviewed with patient/caregiver  Intervention: Provide Person-Centered Care  Recent Flowsheet Documentation  Taken 7/9/2025 0910 by Franco Cruz RN  Trust Relationship/Rapport:   care explained   choices provided   emotional support provided   empathic listening provided   questions answered   questions encouraged   reassurance provided   thoughts/feelings acknowledged  Goal: Readiness for Transition of Care  Outcome: Progressing     Problem: Pain Acute  Goal: Optimal Pain Control and Function  Outcome: Progressing  Intervention: Optimize Psychosocial Wellbeing  Recent Flowsheet Documentation  Taken 7/9/2025 0910 by Franco Cruz RN  Supportive Measures:   active listening utilized   decision-making supported   goal-setting facilitated   positive reinforcement provided   problem-solving facilitated   relaxation techniques promoted   self-care encouraged   verbalization of feelings encouraged  Diversional Activities: television  Intervention: Develop Pain Management Plan  Recent Flowsheet Documentation  Taken 7/9/2025 1830 by Franco Cruz RN  Pain Management Interventions: rest  Taken 7/9/2025 1800 by Franco Cruz RN  Pain Management Interventions: medication offered but refused  Taken 7/9/2025 1730 by Franco Crzu RN  Pain Management Interventions: rest  Taken 7/9/2025 1612 by Franco Cruz RN  Pain Management Interventions: rest  Taken 7/9/2025 1411 by Franco Cruz RN  Pain Management Interventions: rest  Taken 7/9/2025 1230 by Franco Cruz RN  Pain Management Interventions: rest  Taken 7/9/2025 1150 by Franco Cruz RN  Pain Management Interventions: rest  Taken 7/9/2025 0910 by Franco Cruz RN  Pain Management Interventions: pain management plan reviewed with patient/caregiver  Intervention: Prevent or  Manage Pain  Recent Flowsheet Documentation  Taken 7/9/2025 0910 by Franco Cruz RN  Sensory Stimulation Regulation:   care clustered   television on  Bowel Elimination Promotion: (NPO, IV infusing)   adequate fluid intake promoted   ambulation promoted   privacy promoted  Medication Review/Management: medications reviewed     Problem: Electrolyte Imbalance  Goal: Electrolyte Balance  Outcome: Progressing  Intervention: Monitor and Manage Electrolyte Imbalance  Recent Flowsheet Documentation  Taken 7/9/2025 0910 by Franco Cruz RN  Fluid/Electrolyte Management: (IV fluids) other (see comments)     Problem: Excessive Substance Use  Goal: Optimized Energy Level (Excessive Substance Use)  Outcome: Progressing  Goal: Improved Behavioral Control (Excessive Substance Use)  Outcome: Progressing  Goal: Increased Participation and Engagement (Excessive Substance Use)  Outcome: Progressing  Intervention: Facilitate Participation and Engagement  Recent Flowsheet Documentation  Taken 7/9/2025 0910 by Franco Cruz RN  Supportive Measures:   active listening utilized   decision-making supported   goal-setting facilitated   positive reinforcement provided   problem-solving facilitated   relaxation techniques promoted   self-care encouraged   verbalization of feelings encouraged  Goal: Improved Physiologic Symptoms (Excessive Substance Use)  Outcome: Progressing  Goal: Enhanced Social, Occupational or Functional Skills (Excessive Substance Use)  Outcome: Progressing  Intervention: Promote Social, Occupational and Functional Ability  Recent Flowsheet Documentation  Taken 7/9/2025 0910 by Franco Cruz RN  Trust Relationship/Rapport:   care explained   choices provided   emotional support provided   empathic listening provided   questions answered   questions encouraged   reassurance provided   thoughts/feelings acknowledged

## 2025-07-10 NOTE — PLAN OF CARE
"Goal Outcome Evaluation:      Plan of Care Reviewed With: patient    Overall Patient Progress: no changeOverall Patient Progress: no change    Outcome Evaluation: VSS, on RA; denies pain; K down to 4.7; started on BGL and ISS, bedtime check WNL; remote tele-ST; voiding, up ad cristine; plans pending          Problem: Adult Inpatient Plan of Care  Goal: Plan of Care Review  Description: The Plan of Care Review/Shift note should be completed every shift.  The Outcome Evaluation is a brief statement about your assessment that the patient is improving, declining, or no change.  This information will be displayed automatically on your shift  note.  Outcome: Progressing  Flowsheets (Taken 7/10/2025 0455)  Outcome Evaluation:   VSS, on RA   denies pain   K down to 4.7   started on BGL and ISS, bedtime check WNL   remote tele-ST   voiding, up ad cristine   plans pending  Plan of Care Reviewed With: patient  Overall Patient Progress: no change  Goal: Patient-Specific Goal (Individualized)  Description: You can add care plan individualizations to a care plan. Examples of Individualization might be:  \"Parent requests to be called daily at 9am for status\", \"I have a hard time hearing out of my right ear\", or \"Do not touch me to wake me up as it startles  me\".  Outcome: Progressing  Goal: Absence of Hospital-Acquired Illness or Injury  Outcome: Progressing  Intervention: Identify and Manage Fall Risk  Recent Flowsheet Documentation  Taken 7/9/2025 2036 by Tripp Corcoran, RN  Safety Promotion/Fall Prevention:   activity supervised   assistive device/personal items within reach   increased rounding and observation   increase visualization of patient   nonskid shoes/slippers when out of bed   safety round/check completed   room organization consistent   clutter free environment maintained  Intervention: Prevent Skin Injury  Recent Flowsheet Documentation  Taken 7/9/2025 2036 by Tripp Corcoran, RN  Body Position: position changed " independently  Intervention: Prevent and Manage VTE (Venous Thromboembolism) Risk  Recent Flowsheet Documentation  Taken 7/9/2025 2036 by Tripp Corcoran RN  VTE Prevention/Management: (up ad cristine ambulating often) patient refused intervention  Intervention: Prevent Infection  Recent Flowsheet Documentation  Taken 7/9/2025 2036 by Tripp Corcoran RN  Infection Prevention:   single patient room provided   rest/sleep promoted   cohorting utilized  Goal: Optimal Comfort and Wellbeing  Outcome: Progressing  Intervention: Monitor Pain and Promote Comfort  Recent Flowsheet Documentation  Taken 7/9/2025 2036 by Tripp Corcoran RN  Pain Management Interventions:   medication (see MAR)   quiet environment facilitated   rest  Intervention: Provide Person-Centered Care  Recent Flowsheet Documentation  Taken 7/9/2025 2036 by Tripp Corcoran RN  Trust Relationship/Rapport:   care explained   empathic listening provided   questions answered   reassurance provided   thoughts/feelings acknowledged  Goal: Readiness for Transition of Care  Outcome: Progressing     Problem: Pain Acute  Goal: Optimal Pain Control and Function  Outcome: Progressing  Intervention: Develop Pain Management Plan  Recent Flowsheet Documentation  Taken 7/9/2025 2036 by Tripp Corcoran RN  Pain Management Interventions:   medication (see MAR)   quiet environment facilitated   rest  Intervention: Prevent or Manage Pain  Recent Flowsheet Documentation  Taken 7/9/2025 2036 by Tripp Corcoran RN  Sensory Stimulation Regulation:   care clustered   television on  Medication Review/Management: medications reviewed     Problem: Electrolyte Imbalance  Goal: Electrolyte Balance  Outcome: Progressing     Problem: Excessive Substance Use  Goal: Optimized Energy Level (Excessive Substance Use)  Outcome: Progressing  Goal: Improved Behavioral Control (Excessive Substance Use)  Outcome: Progressing  Goal: Increased Participation and Engagement (Excessive Substance  Use)  Outcome: Progressing  Goal: Improved Physiologic Symptoms (Excessive Substance Use)  Outcome: Progressing  Goal: Enhanced Social, Occupational or Functional Skills (Excessive Substance Use)  Outcome: Progressing  Intervention: Promote Social, Occupational and Functional Ability  Recent Flowsheet Documentation  Taken 7/9/2025 2036 by rTipp Corcoran, RN  Trust Relationship/Rapport:   care explained   empathic listening provided   questions answered   reassurance provided   thoughts/feelings acknowledged

## 2025-07-10 NOTE — PROGRESS NOTES
Patient tolerated Paracentesis well.  660 mL of very cloudy, ismael fluid drained. Done by Marcelo. Dressing applied with no drainage.  No albumin given, unit standing protocol not met. Patient states understanding of instructions. Pt left unit in stable condition. All fluid brought to lab for analysis. Report called to primary nurse.

## 2025-07-10 NOTE — CONSULTS
"[facility] - Consult Note  Ortho 6    Referral Source/ Reason for Visit: Staff consult at patient's request for emotional and spiritual support.    Summary and Recommendations -     Compassionately listened to patient tearfully review his current life and health circumstances.    \"God created me, and I F____d up,\" he lamented.    He went on to say that family, work and personal struggles led him to self-medicate with whiskey and scotch.    I offered an approach to patient that self love would be more helpful to his healing than shame.  He thanked me for that insight; saying, \"I am currently filled with shame.\"    He said he is not open to chemical dependency treatment at this time.    Patient asked for prayer for healing, which I provided.      Plan: Patient remains open to future visits .  SHS is available upon request.    Rev. MARIIA Pinzon.  Staff     SHS available 24/7 for emergent requests/ referrals, either by paging the on-call  or by entering an ASAP/STAT consult in [employer], which will also page the on-call .      Assessment    Saw pt Ricci \"Terence\" X Brown and wife regarding patient request for emotional and spiritual support.    Patient/Family Understanding of Illness and Goals of Care - He understands that he is at  regarding liver cirrhosis and other hospital problems.  Medical record also lists sepsis.    Distress and Loss - Patient named several significant life stressors, including his father recently, \"Disowning him;\" a job at a [employer] that is based on commission; making it difficult for him to create a family budget; and \"Personal pride that has gotten me in a lot of trouble.\"    Strengths, Coping and Resources - He names his wife as a source of support.    Meaning, Beliefs and Spirituality - Patient describes himself as Presbyterian with Religious influences.  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        "

## 2025-07-10 NOTE — PLAN OF CARE
"Goal Outcome Evaluation:      Plan of Care Reviewed With: patient, spouse    Overall Patient Progress: no changeOverall Patient Progress: no change    Outcome Evaluation: VSS, Febrile today and BC sent then rocephin given and pt had paracentesis where they drained 660ml, wife at bedside, passing flatus but abdomen large and has RUQ pain and shoulder pain, 's today, CIWA ok, diaphoretic today, took one dose IV dilaudid with some relief, small appetite, IVF          Problem: Adult Inpatient Plan of Care  Goal: Plan of Care Review  Description: The Plan of Care Review/Shift note should be completed every shift.  The Outcome Evaluation is a brief statement about your assessment that the patient is improving, declining, or no change.  This information will be displayed automatically on your shift  note.  Outcome: Progressing  Flowsheets (Taken 7/10/2025 2526)  Outcome Evaluation: VSS, Febrile today and BC sent then rocephin given and pt had paracentesis where they drained 660ml, wife at bedside, passing flatus but abdomen large and has RUQ pain and shoulder pain, 's today, CIWA ok, diaphoretic today, took one dose IV dilaudid with some relief, small appetite, IVF  Plan of Care Reviewed With:   patient   spouse  Overall Patient Progress: no change  Goal: Patient-Specific Goal (Individualized)  Description: You can add care plan individualizations to a care plan. Examples of Individualization might be:  \"Parent requests to be called daily at 9am for status\", \"I have a hard time hearing out of my right ear\", or \"Do not touch me to wake me up as it startles  me\".  Outcome: Progressing  Goal: Absence of Hospital-Acquired Illness or Injury  Outcome: Progressing  Intervention: Identify and Manage Fall Risk  Recent Flowsheet Documentation  Taken 7/10/2025 1622 by Raquel Echeverria RN  Safety Promotion/Fall Prevention:   activity supervised   assistive device/personal items within reach   increased rounding and " observation   increase visualization of patient   nonskid shoes/slippers when out of bed   safety round/check completed   room organization consistent   clutter free environment maintained  Intervention: Prevent Skin Injury  Recent Flowsheet Documentation  Taken 7/10/2025 0947 by Raquel Echeverria RN  Body Position: position changed independently  Intervention: Prevent and Manage VTE (Venous Thromboembolism) Risk  Recent Flowsheet Documentation  Taken 7/10/2025 0947 by Raquel Echeverria RN  VTE Prevention/Management: (up ad cristine ambulating often) patient refused intervention  Intervention: Prevent Infection  Recent Flowsheet Documentation  Taken 7/10/2025 0947 by Raquel Echeverria RN  Infection Prevention:   single patient room provided   rest/sleep promoted   cohorting utilized  Goal: Optimal Comfort and Wellbeing  Outcome: Progressing  Intervention: Provide Person-Centered Care  Recent Flowsheet Documentation  Taken 7/10/2025 0947 by Raquel Echeverria RN  Trust Relationship/Rapport:   care explained   empathic listening provided   questions answered   reassurance provided   thoughts/feelings acknowledged  Goal: Readiness for Transition of Care  Outcome: Progressing     Problem: Pain Acute  Goal: Optimal Pain Control and Function  Outcome: Progressing  Intervention: Prevent or Manage Pain  Recent Flowsheet Documentation  Taken 7/10/2025 0947 by Raquel Echeverria RN  Sensory Stimulation Regulation:   care clustered   television on  Medication Review/Management: medications reviewed     Problem: Electrolyte Imbalance  Goal: Electrolyte Balance  Outcome: Progressing     Problem: Excessive Substance Use  Goal: Optimized Energy Level (Excessive Substance Use)  Outcome: Progressing  Goal: Improved Behavioral Control (Excessive Substance Use)  Outcome: Progressing  Goal: Increased Participation and Engagement (Excessive Substance Use)  Outcome: Progressing  Goal: Improved Physiologic Symptoms (Excessive  Substance Use)  Outcome: Progressing  Goal: Enhanced Social, Occupational or Functional Skills (Excessive Substance Use)  Outcome: Progressing  Intervention: Promote Social, Occupational and Functional Ability  Recent Flowsheet Documentation  Taken 7/10/2025 1017 by Raquel Echeverria RN  Trust Relationship/Rapport:   care explained   empathic listening provided   questions answered   reassurance provided   thoughts/feelings acknowledged

## 2025-07-10 NOTE — PROGRESS NOTES
Cross cover    Called with report of elevated glucoses.    Start Glucose management with AC and at bedtime glucose checks and medium intensity sliding scale.    VINCENT

## 2025-07-10 NOTE — CONSULTS
[facility]    RENAL CONSULTATION NOTE    REFERRING MD:  Dr. Andrea    REASON FOR CONSULTATION:  ISABELLE c hyperkalemia    DATE OF CONSULTATION: 07/10/25    SHORTHAND KEY FOR MY NOTES:  c = with, s = without, p = after, a = before, x = except, asx = asymptomatic, tx = transplant or treatment, sx = symptoms or symptomatic, cx = canceled or culture, rxn = reaction, yday = yesterday, nl = normal, abx = antibiotics, fxn = function, dx = diagnosis, dz = disease, m/h = melena/hematochezia, c/d/l/ha = cramping/dizziness/lightheadedness/headache, d/c = discharge or diarrhea/constipation, f/c/n/v = fevers/chills/nausea/vomiting, cp/sob = chest pain/shortness of breath, tbv = total body volume, rxn = reaction, tdc = tunneled dialysis catheter, pta = prior to admission, hd = hemodialysis, pd = peritoneal dialysis, hhd = home hemodialysis, edw = estimated dry wt    HPI: Ricci Brown is a 35 year old male c h/o EtOH abuse who was admitted on 7/8/2025 c abd pain and developed ISABELLE c hyperkalemia.  Notes from Jeremiah Garcia (ER), Oziel Andrea (Hosp),reginald Dawson (GI) were reviewed.    Pt is a significant drinker (6-10 oz of whisky/scotch per day) and developed sudden RUQ pain while at work.  He didn't have any f/c/n/v.  Labs showed a high bili and abn LFTs and an abd CT c IV dye showed cirrhosis.  His UA was pretty concentrated and had a lot of hyaline casts.  He subsequently had an MRCP that showed cholelithiasis and cirrhosis.      On admission, the pt's Cr was nl.  Yday, it steffany to 1.9, his CO2 was 21 and K steffany to 6.3.  He was treated medically and the K improved c the addition of bicarb.  Overnight, his abd has become more distended c the IVF.      Today, he is breathing ok and doesn't have any cp.  His abd pain is a bit worse and he has a f/c today.  He just had a para c ~650 ml of fluid removed.  The cell count showed 90k cells and the pt has SBP.  He is now on abx.     Of note, the pt's wife  states that his eyes will intermittently turn yellow and then get better the next day.  They both note that he has scleral icterus and his skin is relatively yellow.  He notes that he has very pale skin at baseline.  His urine has been dark for a while.    ROS:  A complete review of systems was performed and is x as noted above.    PMH:    No past medical history on file.  PSH:    No past surgical history on file.  MEDICATIONS:    Current Facility-Administered Medications   Medication Dose Route Frequency Provider Last Rate Last Admin    cefTRIAXone (ROCEPHIN) 2 g vial to attach to  ml bag for ADULTS or NS 50 ml bag for PEDS  2 g Intravenous Q24H Irvin Andrea MD   2 g at 07/10/25 0832    docusate sodium (COLACE) capsule 100 mg  100 mg Oral BID Irvin Andrea MD        fexofenadine (ALLEGRA) tablet 60 mg  60 mg Oral Daily Irvin Andrea MD   60 mg at 07/10/25 1505    folic acid (FOLVITE) tablet 1 mg  1 mg Oral Daily Irvin Andrea MD   1 mg at 07/10/25 0816    [START ON 7/16/2025] gabapentin (NEURONTIN) capsule 100 mg  100 mg Oral Q8H Nabor Ludwig MD        [START ON 7/14/2025] gabapentin (NEURONTIN) capsule 300 mg  300 mg Oral Q8H Nabor Ludwig MD        [START ON 7/12/2025] gabapentin (NEURONTIN) capsule 600 mg  600 mg Oral Q8H Nabor Ludwig MD        gabapentin (NEURONTIN) capsule 900 mg  900 mg Oral Q8H Nabor Ludwig MD   900 mg at 07/10/25 1504    insulin aspart (NovoLOG) injection (RAPID ACTING)  1-7 Units Subcutaneous TID Narciso Clifton MD        insulin aspart (NovoLOG) injection (RAPID ACTING)  1-5 Units Subcutaneous At Bedtime Narciso Mckeon MD        lidocaine 1 % 1-30 mL  1-30 mL Subcutaneous Once Raquel Robertson DO        pantoprazole (PROTONIX) EC tablet 40 mg  40 mg Oral BID AC Irvin Andrea MD   40 mg at 07/10/25 1650    polyethylene glycol (MIRALAX) Packet 17 g  17 g Oral Daily Irvin Andrea MD   17 g at 07/10/25 7670     sodium chloride (PF) 0.9% PF flush 3 mL  3 mL Intracatheter Q8H [facility] Nabor Ludwig MD        thiamine (B-1) tablet 100 mg  100 mg Oral Daily Irvin Andrea MD   100 mg at 07/10/25 0816     ALLERGIES:    Allergies as of 07/08/2025    (No Known Allergies)     FH:    No family history on file.  SH:    Social History     Socioeconomic History    Marital status:      Spouse name: Not on file    Number of children: Not on file    Years of education: Not on file    Highest education level: Not on file   Occupational History    Not on file   Tobacco Use    Smoking status: Not on file    Smokeless tobacco: Not on file   Substance and Sexual Activity    Alcohol use: Not on file    Drug use: Not on file    Sexual activity: Not on file   Other Topics Concern    Not on file   Social History Narrative    Not on file     Social Drivers of Health     Financial Resource Strain: Low Risk  (7/8/2025)    Financial Resource Strain     Within the past 12 months, have you or your family members you live with been unable to get utilities (heat, electricity) when it was really needed?: No   Food Insecurity: Low Risk  (7/8/2025)    Food Insecurity     Within the past 12 months, did you worry that your food would run out before you got money to buy more?: No     Within the past 12 months, did the food you bought just not last and you didn t have money to get more?: No   Transportation Needs: Low Risk  (7/8/2025)    Transportation Needs     Within the past 12 months, has lack of transportation kept you from medical appointments, getting your medicines, non-medical meetings or appointments, work, or from getting things that you need?: No   Physical Activity: Not on file   Stress: Not on file   Social Connections: Unknown (1/1/2022)    [facility]    Social Connections     Frequency of Communication with Friends and Family: Not on file   Interpersonal Safety: Low Risk  (7/8/2025)     Interpersonal Safety     Do you feel physically and emotionally safe where you currently live?: Yes     Within the past 12 months, have you been hit, slapped, kicked or otherwise physically hurt by someone?: No     Within the past 12 months, have you been humiliated or emotionally abused in other ways by your partner or ex-partner?: No   Housing Stability: Low Risk  (7/8/2025)    Housing Stability     Do you have housing? : Yes     Are you worried about losing your housing?: No     PHYSICAL EXAM:    /67 (BP Location: Right arm)   Pulse (!) 128   Temp 98.2  F (36.8  C) (Temporal)   Resp 22   Ht 1.829 m (6')   Wt 115.7 kg (255 lb 1.2 oz)   SpO2 94%   BMI 34.59 kg/m      GENERAL: awake, alert, NAD  HEENT:  normocephalic, + scleral icterus  CV: reg, tachy, nl S1/S2; 1+ ble edema  RESP: CTA B c good efforts  GI: abd obese, distended, tympanic, tender, no leakage from L abd para site   MUSCULOSKELETAL: extremities nl - no gross deformities noted  SKIN: jaundiced (pt notes it is relatively yellow vs baseline)  NEURO:  strength normal and symmetric  PSYCH: mood ok, affect appropriate    LABS:      CBC RESULTS:     Recent Labs   Lab 07/10/25  0647 07/09/25  0811 07/08/25  1512   WBC 10.4 12.7* 4.5   RBC 3.41* 3.83* 4.23*   HGB 12.0* 13.5 14.7   HCT 35.3* 39.7* 42.4   PLT 69* 65* 68*     BMP RESULTS:  Recent Labs   Lab 07/10/25  1707 07/10/25  1232 07/10/25  0647 07/10/25  0216 07/09/25  2150 07/09/25  1955 07/09/25  1606 07/09/25  1333 07/09/25  1212 07/09/25  0811 07/08/25  1512   NA  --   --  133*  --   --  135  --   --  135 134* 137   POTASSIUM  --   --  4.7  --   --  4.7  --  6.3* 6.0* 6.2* 4.3   CHLORIDE  --   --  93*  --   --  95*  --   --  99 99 97*   CO2  --   --  26  --   --  23  --   --  21* 20* 22   BUN  --   --  27.6*  --   --  22.9*  --   --  19.9 15.0 5.3*   CR  --   --  1.81*  --   --  1.93*  --   --  1.84* 1.77* 0.72   * 140* 121* 129* 179* 188*   < >  --  115* 131* 167*   REX  --   --   9.0  --   --  9.1  --   --  9.3 9.4 10.3    < > = values in this interval not displayed.     INRNo lab results found in last 7 days.     DIAGNOSTICS:  Personally reviewed - abd CT c IV contrast    A/P:  Ricci Brown is a 35 year old male c EtOH abuse who has ISABELLE, resolved severe hyperkalemia, and SBP.    1.  ISABELLE.  Pt's baseline renal fxn is nl.  It steffany to 1.9 yday p getting IV contrast dye c his CT.  The vidal he rec'd from the MRI is not likely to have played a role.  He is urinating less than before but still making some urine.  Chemistries are ok today.  He now has SBP and his liver fxn is worsening so will need to watch his kidney fxn closely.  A.  Follow labs, uo, sx daily.  B.  Avoid nephrotoxics.    2.  Severe hyperkalemia + met acidosis.  The K was high due to the relative acidosis.  He improved p getting IV bicarb.  Now the abd is getting larger so will slow down the rate of IVF to prevent more third-spacing.  A.  Continue IV c bicarb for another day but reduce rate to 25 ml/h.  B.  If the abd starts to get bigger, ok to stop the fluids.    3.  EtOHism + EtOHic liver cirrhosis.  GI is involved.  Bili is rising.  He had 650 ml of fluid removed today and may need another therapeutic para at some point soon.  His alb is lower, which is prob more reflective of his baseline since his initial labs were hemoconcentrated.    A.  Follow clinically.  B.  Watch for signs of  withdrawal.  C.  Follow labs daily.    4.  SBP.  Pt has a f/c and his cell count showed 90k WBCs.    A.  Dose abx properly for his renal fxn.  B.  Follow clinically.    5.  FEN.  Electrolytes are fine today.  A.  Continue low K diet.    Thank you for this consultation. We will follow c you.  Please call if any questions.  Case d/w Dr. Andrea, pt/wife.    Attestation:   I have reviewed today's relevant vital signs, notes, medications, labs and imaging.    Oscar Batista MD  [facility] - Nephrology  [phone]

## 2025-07-10 NOTE — PROGRESS NOTES
GASTROENTEROLOGY PROGRESS NOTE       SUBJECTIVE:  Now having more pain and a fever. Was better last evening and was eating.     OBJECTIVE:  /65 (BP Location: Right arm)   Pulse (!) 138   Temp 100.3  F (37.9  C) (Oral)   Resp 20   Ht 1.829 m (6')   Wt 115.7 kg (255 lb 1.2 oz)   SpO2 93%   BMI 34.59 kg/m    Temp (24hrs), Av.9  F (37.7  C), Min:98.6  F (37  C), Max:101.9  F (38.8  C)    Patient Vitals for the past 72 hrs:   Weight   07/10/25 0809 115.7 kg (255 lb 1.2 oz)   25 204 112.8 kg (248 lb 9.6 oz)   25 1505 106.6 kg (235 lb)       Intake/Output Summary (Last 24 hours) at 7/10/2025 0831  Last data filed at 2025 203  Gross per 24 hour   Intake 1196 ml   Output 200 ml   Net 996 ml        PHYSICAL EXAM     Cardiovascular: Normal  Respiratory: Normal  Gastrointestinal: Decreased BS, abd more firm, mildly tender to gentle pressure RUQ  Neuro: No asterixis      Recent Labs   Lab Test 07/10/25  0647 25  0811 25  1512   WBC 10.4 12.7* 4.5   HGB 12.0* 13.5 14.7   * 104* 100   PLT 69* 65* 68*     Recent Labs   Lab Test 07/10/25  0647 25  1955 25  1333 25  1212   POTASSIUM 4.7 4.7 6.3* 6.0*   CHLORIDE 93* 95*  --  99   CO2 26 23  --  21*   BUN 27.6* 22.9*  --  19.9   ANIONGAP 14 17*  --  15     Recent Labs   Lab Test 07/10/25  0647 25  0811 25  1611 25  1512   ALBUMIN 3.4* 3.8  --  4.4   BILITOTAL 12.2* 8.7*  --  5.9*   ALT 35 41  --  64   AST 97* 138*  --  231*   PROTEIN  --   --  50*  --    LIPASE  --   --   --  38           Active Problems:    Alcoholic cirrhosis of liver with ascites (H)    Assessment: More pain today with fever, weight increased and more abdominal distension. Likely enough ascites to sample to see if peritonitis cause of pain. Feels constipated, but this does not fit as cause for current symptoms. HIDA normal. On imaging, hepatomegaly seen, but this should not cause a fever. Increased bilirubin likely reflects  underlying cirrhosis, with perhaps infection.    Plan: Already in process of blood cultures and urine culture. Will order paracentesis with cell count, differential and albumin.        Eric Dawson MD  [facility]  Office:  [phone]

## 2025-07-10 NOTE — PLAN OF CARE
Received call from [employer] that around 1700 there appeared to be something that looked like 6 beats of VTach in one lead only and may have been aberrant. No need to update provider unless 10 beats and pt checked and is doing fine. Will monitor.

## 2025-07-11 LAB
ALBUMIN SERPL BCG-MCNC: 3.1 G/DL (ref 3.5–5.2)
ALP SERPL-CCNC: 107 U/L (ref 40–150)
ALT SERPL W P-5'-P-CCNC: 28 U/L (ref 0–70)
ANION GAP SERPL CALCULATED.3IONS-SCNC: 14 MMOL/L (ref 7–15)
AST SERPL W P-5'-P-CCNC: 85 U/L (ref 0–45)
BILIRUB SERPL-MCNC: 11.2 MG/DL
BUN SERPL-MCNC: 23.1 MG/DL (ref 6–20)
CALCIUM SERPL-MCNC: 8.6 MG/DL (ref 8.8–10.4)
CHLORIDE SERPL-SCNC: 96 MMOL/L (ref 98–107)
CREAT SERPL-MCNC: 0.98 MG/DL (ref 0.67–1.17)
EGFRCR SERPLBLD CKD-EPI 2021: >90 ML/MIN/1.73M2
ERYTHROCYTE [DISTWIDTH] IN BLOOD BY AUTOMATED COUNT: 13.6 % (ref 10–15)
GLUCOSE BLDC GLUCOMTR-MCNC: 102 MG/DL (ref 70–99)
GLUCOSE BLDC GLUCOMTR-MCNC: 115 MG/DL (ref 70–99)
GLUCOSE BLDC GLUCOMTR-MCNC: 142 MG/DL (ref 70–99)
GLUCOSE BLDC GLUCOMTR-MCNC: 144 MG/DL (ref 70–99)
GLUCOSE SERPL-MCNC: 129 MG/DL (ref 70–99)
HCO3 SERPL-SCNC: 24 MMOL/L (ref 22–29)
HCT VFR BLD AUTO: 32.9 % (ref 40–53)
HGB BLD-MCNC: 11.4 G/DL (ref 13.3–17.7)
INR PPP: 2.03 (ref 0.85–1.15)
LACTATE SERPL-SCNC: 0.9 MMOL/L (ref 0.7–2)
MCH RBC QN AUTO: 35.3 PG (ref 26.5–33)
MCHC RBC AUTO-ENTMCNC: 34.7 G/DL (ref 31.5–36.5)
MCV RBC AUTO: 102 FL (ref 78–100)
PLATELET # BLD AUTO: 60 10E3/UL (ref 150–450)
POTASSIUM SERPL-SCNC: 3.7 MMOL/L (ref 3.4–5.3)
PROT SERPL-MCNC: 7 G/DL (ref 6.4–8.3)
PROTHROMBIN TIME: 22.8 SECONDS (ref 11.8–14.8)
RBC # BLD AUTO: 3.23 10E6/UL (ref 4.4–5.9)
SODIUM SERPL-SCNC: 134 MMOL/L (ref 135–145)
WBC # BLD AUTO: 6.6 10E3/UL (ref 4–11)

## 2025-07-11 PROCEDURE — 84155 ASSAY OF PROTEIN SERUM: CPT | Performed by: INTERNAL MEDICINE

## 2025-07-11 PROCEDURE — 85610 PROTHROMBIN TIME: CPT | Performed by: PHYSICIAN ASSISTANT

## 2025-07-11 PROCEDURE — 250N000011 HC RX IP 250 OP 636: Performed by: INTERNAL MEDICINE

## 2025-07-11 PROCEDURE — 99233 SBSQ HOSP IP/OBS HIGH 50: CPT | Performed by: INTERNAL MEDICINE

## 2025-07-11 PROCEDURE — 250N000013 HC RX MED GY IP 250 OP 250 PS 637: Performed by: STUDENT IN AN ORGANIZED HEALTH CARE EDUCATION/TRAINING PROGRAM

## 2025-07-11 PROCEDURE — 85027 COMPLETE CBC AUTOMATED: CPT | Performed by: INTERNAL MEDICINE

## 2025-07-11 PROCEDURE — 250N000013 HC RX MED GY IP 250 OP 250 PS 637: Performed by: INTERNAL MEDICINE

## 2025-07-11 PROCEDURE — 83605 ASSAY OF LACTIC ACID: CPT | Performed by: INTERNAL MEDICINE

## 2025-07-11 PROCEDURE — 120N000001 HC R&B MED SURG/OB

## 2025-07-11 PROCEDURE — 36415 COLL VENOUS BLD VENIPUNCTURE: CPT | Performed by: INTERNAL MEDICINE

## 2025-07-11 RX ORDER — HYDROMORPHONE HCL IN WATER/PF 6 MG/30 ML
0.2 PATIENT CONTROLLED ANALGESIA SYRINGE INTRAVENOUS
Status: DISPENSED | OUTPATIENT
Start: 2025-07-11

## 2025-07-11 RX ORDER — OXYCODONE HYDROCHLORIDE 5 MG/1
5 TABLET ORAL EVERY 6 HOURS PRN
Refills: 0 | Status: DISPENSED | OUTPATIENT
Start: 2025-07-11

## 2025-07-11 RX ADMIN — GABAPENTIN 900 MG: 300 CAPSULE ORAL at 00:18

## 2025-07-11 RX ADMIN — THIAMINE HCL TAB 100 MG 100 MG: 100 TAB at 09:03

## 2025-07-11 RX ADMIN — GABAPENTIN 900 MG: 300 CAPSULE ORAL at 09:03

## 2025-07-11 RX ADMIN — FEXOFENADINE HYDROCHLORIDE 60 MG: 60 TABLET ORAL at 09:04

## 2025-07-11 RX ADMIN — FOLIC ACID 1 MG: 1 TABLET ORAL at 09:03

## 2025-07-11 RX ADMIN — PANTOPRAZOLE SODIUM 40 MG: 40 TABLET, DELAYED RELEASE ORAL at 16:15

## 2025-07-11 RX ADMIN — GABAPENTIN 900 MG: 300 CAPSULE ORAL at 16:15

## 2025-07-11 RX ADMIN — POLYETHYLENE GLYCOL 3350 17 G: 17 POWDER, FOR SOLUTION ORAL at 09:04

## 2025-07-11 RX ADMIN — CEFTRIAXONE 2 G: 2 INJECTION, POWDER, FOR SOLUTION INTRAMUSCULAR; INTRAVENOUS at 09:05

## 2025-07-11 RX ADMIN — DOCUSATE SODIUM 100 MG: 100 CAPSULE, LIQUID FILLED ORAL at 20:25

## 2025-07-11 RX ADMIN — OXYCODONE HYDROCHLORIDE 5 MG: 5 TABLET ORAL at 09:03

## 2025-07-11 RX ADMIN — GABAPENTIN 900 MG: 300 CAPSULE ORAL at 23:19

## 2025-07-11 RX ADMIN — DOCUSATE SODIUM 100 MG: 100 CAPSULE, LIQUID FILLED ORAL at 09:04

## 2025-07-11 RX ADMIN — PANTOPRAZOLE SODIUM 40 MG: 40 TABLET, DELAYED RELEASE ORAL at 06:30

## 2025-07-11 ASSESSMENT — ACTIVITIES OF DAILY LIVING (ADL)
ADLS_ACUITY_SCORE: 20

## 2025-07-11 NOTE — PROGRESS NOTES
Notes, labs, vitals reviewed.  Pt's renal fxn has improved back to baseline.  I will sign off.  Please call c any ?s.  Thank you for this consultation.

## 2025-07-11 NOTE — PROGRESS NOTES
GASTROENTEROLOGY PROGRESS NOTE     SUBJECTIVE:  Still with very focal RUQ pain but general abdominal discomfort/bloating better following paracentesis 7/10. No stools. No confusion. Tolerating diet. Wife at bedside.      OBJECTIVE:  /83 (BP Location: Right arm)   Pulse 116   Temp 97.9  F (36.6  C) (Temporal)   Resp 24   Ht 1.829 m (6')   Wt 115.7 kg (255 lb 1.2 oz)   SpO2 93%   BMI 34.59 kg/m    Temp (24hrs), Av.6  F (37  C), Min:97.9  F (36.6  C), Max:99.2  F (37.3  C)    Patient Vitals for the past 72 hrs:   Weight   07/10/25 0809 115.7 kg (255 lb 1.2 oz)   25 2042 112.8 kg (248 lb 9.6 oz)   25 1505 106.6 kg (235 lb)     No intake or output data in the 24 hours ending 25 1251     PHYSICAL EXAM  Gen: alert, oriented, NAD  Abd: distended, soft, +BS, mild focal RUQ pain.   Ext: mild edema     Additional Comments:  ROS, FH, SH: See initial GI consult for details.     I have reviewed the patient's new clinical lab results:     Recent Labs   Lab Test 25  0549 07/10/25  0647 25  0811   WBC 6.6 10.4 12.7*   HGB 11.4* 12.0* 13.5   * 104* 104*   PLT 60* 69* 65*   INR 2.03*  --   --      Recent Labs   Lab Test 25  0549 07/10/25  0647 25  1955   POTASSIUM 3.7 4.7 4.7   CHLORIDE 96* 93* 95*   CO2 24 26 23   BUN 23.1* 27.6* 22.9*   ANIONGAP 14 14 17*     Recent Labs   Lab Test 25  0549 07/10/25  1345 07/10/25  0647 25  0811 25  1611 25  1512   ALBUMIN 3.1*  --  3.4* 3.8  --  4.4   BILITOTAL 11.2*  --  12.2* 8.7*  --  5.9*   ALT 28  --  35 41  --  64   AST 85*  --  97* 138*  --  231*   PROTEIN  --  30*  --   --  50*  --    LIPASE  --   --   --   --   --  38     Imaging:  CT abd/pelvis :  1.  Cirrhotic morphology of the liver with evidence of portal hypertension and trace abdominopelvic free fluid. No definite drainable ascites at this time.  2.  Superimposed hepatic steatosis is nonspecific but given heterogeneity and subtle surrounding  fat stranding, superimposed acute hepatitis is also possible.  3.  Likely cholelithiasis and/or sludge with some subtle fat stranding adjacent to gallbladder, could be related to #'s 1 and 2, but consider further evaluation with right upper quadrant ultrasound if there is clinical concern for acute cholecystitis. No   evidence of biliary obstruction.    RUQ US 7/8:  1.  Few small stones versus sludge in the gallbladder. No wall thickening or free fluid to suggest acute cholecystitis.  2.  Hepatic steatosis.    HIDA 7/9:  FINDINGS: Normal radionuclide activity in liver, gallbladder, bile ducts, and small bowel. No evidence of cystic or common duct obstruction or intrinsic liver disease.                                                                  IMPRESSION:   Negative for acute cholecystitis.    MRCP 7/9:  1.  Cirrhosis with small volume ascites.  2.  No biliary dilatation or choledocholithiasis.  3.  Cholelithiasis.    RUQ US with Doppler 7/10:  1.  Technically challenging and limited exam.  2.  Hepatic steatosis.  3.  Nondiagnostic evaluation of the hepatic vasculature. The portal and hepatic veins were patent on 7/8/2025 CT. They also appeared patent on yesterday's MRI. If there is continued concern for portal and/or hepatic vein pathology, multiphasic CT would   be recommended.     Assessment:  35 year old male without significant past medical history other than chronic moderate/heavy alcohol use admitted 7/8 with symptoms of RUQ abdominal pain. Found to have elevated liver enzymes and bilirubin, and ISABELLE with CT showing liver cirrhosis with portal hypertension with superimposed hepatic steatosis, gall stones/sludge.    1. RUQ pain, SBP. Some concern initially for choledocholithiasis vs cholecystitis. HIDA negative for cholecystitis. No evidence of ascites on initial imaging but developed fever and abdominal distension 7/10. RUQ US Doppler/MRCP negative for portal vein thrombus. Paracentesis 7/10 showed  absolute neutrophil count >89,000 consistent with SBP. Placed on IV ceftriaxone. Symptomatically better. Fevers resolved. Blood cultures negative. Peritoneal cultures pending.    2. Liver cirrhosis. Diagnosed based on imaging. This is a new diagnosis. History of 10-15 years of moderate/heavy alcohol use (reporting 3-4 hard liquor drinks per day). Alcohol related liver disease most likely.  Liver disease with signs of decompensation with new ascites, SBP, and thrombocytopenia. Labs here most consistent with alcohol related hepatitis. No signs of encephalopathy or GI bleeding.    Hepatitis B/C negative, iron studies negative for iron overload.  US 7/10 - 660ml removed.   T bili 5.9 on admit, peaked 7/10 12.2, today trending down 11.2.  7/11: INR 2.03.     3. ISABELLE. [place] likely pre-renal from contrast, dehydration. HRS felt less likely but considered with liver disease. Nephrology following. Creatinine normalized today.     4. Severe hyperkalemia. Nephrology following, getting IV bicarb. K normal since 7/9.     5. Alcohol use. On CIWA but not showing clear signs of withdrawal. Counseled on need for alcohol cessation.     Plan:  --IV ceftriaxone.  --Typically recommend 25% IV albumin (1.5g/kg) day 1 and (1g/kg) day 3 to prevent ISABELLE in setting of SBP, will defer to renal.  --Await final blood and peritoneal cultures.   --Recommend 2g Na restricted diet.   --Repeat US paracentesis prn.   --Defer diuretics to renal.   --Monitor T bili/INR.   --Complete alcohol cessation.     Discussed with Dr. Dawson.    Time spent: 35 minutes, greater than 50% of the visit was spent in counseling/coordination of care.     Ayesha Phelps, PAC  [facility] ([facility])

## 2025-07-11 NOTE — PLAN OF CARE
"Goal Outcome Evaluation:      Plan of Care Reviewed With: patient    Overall Patient Progress: no changeOverall Patient Progress: no change    Outcome Evaluation: VSS, on RA; no fever overnight; ACHS WNL, no ISS needed; no BM, passing gas; tele ST; rocephin daily; 1/2 NS w/ Sodiam bicarb at 25ml/hr; ciwas minimal score; plans pending        Problem: Adult Inpatient Plan of Care  Goal: Plan of Care Review  Description: The Plan of Care Review/Shift note should be completed every shift.  The Outcome Evaluation is a brief statement about your assessment that the patient is improving, declining, or no change.  This information will be displayed automatically on your shift  note.  Outcome: Progressing  Flowsheets (Taken 7/11/2025 0548)  Outcome Evaluation:   VSS, on RA   no fever overnight   ACHS WNL, no ISS needed   no BM, passing gas   tele ST   rocephin daily   1/2 NS w/ Sodiam bicarb at 25ml/hr   ciwas minimal score   plans pending  Plan of Care Reviewed With: patient  Overall Patient Progress: no change  Goal: Patient-Specific Goal (Individualized)  Description: You can add care plan individualizations to a care plan. Examples of Individualization might be:  \"Parent requests to be called daily at 9am for status\", \"I have a hard time hearing out of my right ear\", or \"Do not touch me to wake me up as it startles  me\".  Outcome: Progressing  Goal: Absence of Hospital-Acquired Illness or Injury  Outcome: Progressing  Intervention: Identify and Manage Fall Risk  Recent Flowsheet Documentation  Taken 7/10/2025 2009 by Tripp Corcoran RN  Safety Promotion/Fall Prevention:   activity supervised   assistive device/personal items within reach   increased rounding and observation   increase visualization of patient   nonskid shoes/slippers when out of bed   safety round/check completed   room organization consistent   clutter free environment maintained   patient and family education   lighting adjusted  Intervention: Prevent " Skin Injury  Recent Flowsheet Documentation  Taken 7/10/2025 2009 by Tripp Corcoran RN  Body Position: position changed independently  Intervention: Prevent and Manage VTE (Venous Thromboembolism) Risk  Recent Flowsheet Documentation  Taken 7/10/2025 2009 by Tripp Corcoran RN  VTE Prevention/Management: (up ad cristine ambulating often) patient refused intervention  Intervention: Prevent Infection  Recent Flowsheet Documentation  Taken 7/10/2025 2009 by Tripp Corcoran RN  Infection Prevention:   single patient room provided   rest/sleep promoted   cohorting utilized  Goal: Optimal Comfort and Wellbeing  Outcome: Progressing  Intervention: Monitor Pain and Promote Comfort  Recent Flowsheet Documentation  Taken 7/10/2025 2009 by Tripp Corcoran RN  Pain Management Interventions:   medication (see MAR)   quiet environment facilitated   rest  Intervention: Provide Person-Centered Care  Recent Flowsheet Documentation  Taken 7/10/2025 2009 by Tripp Corcoran RN  Trust Relationship/Rapport:   care explained   empathic listening provided   questions answered   reassurance provided   thoughts/feelings acknowledged  Goal: Readiness for Transition of Care  Outcome: Progressing     Problem: Pain Acute  Goal: Optimal Pain Control and Function  Outcome: Progressing  Intervention: Develop Pain Management Plan  Recent Flowsheet Documentation  Taken 7/10/2025 2009 by Tripp Corcoran RN  Pain Management Interventions:   medication (see MAR)   quiet environment facilitated   rest  Intervention: Prevent or Manage Pain  Recent Flowsheet Documentation  Taken 7/10/2025 2009 by Tripp Corcoran RN  Sensory Stimulation Regulation:   care clustered   television on  Medication Review/Management: medications reviewed     Problem: Electrolyte Imbalance  Goal: Electrolyte Balance  Outcome: Progressing     Problem: Excessive Substance Use  Goal: Optimized Energy Level (Excessive Substance Use)  Outcome: Progressing  Goal: Improved Behavioral Control  (Excessive Substance Use)  Outcome: Progressing  Goal: Increased Participation and Engagement (Excessive Substance Use)  Outcome: Progressing  Goal: Improved Physiologic Symptoms (Excessive Substance Use)  Outcome: Progressing  Goal: Enhanced Social, Occupational or Functional Skills (Excessive Substance Use)  Outcome: Progressing  Intervention: Promote Social, Occupational and Functional Ability  Recent Flowsheet Documentation  Taken 7/10/2025 2009 by Tripp Corcoran, RN  Trust Relationship/Rapport:   care explained   empathic listening provided   questions answered   reassurance provided   thoughts/feelings acknowledged

## 2025-07-11 NOTE — PROGRESS NOTES
[facility]    Medicine Progress Note - Hospitalist Service    Date of Admission:  7/8/2025    Assessment & Plan      Ricci Brown is a 35 year old male admitted on 7/8/2025. He has little past medical history who presents to the ED for evaluation of abdominal pain.   Terence stated that unfortunately he has been regular EtOH drinker at least for the last 10 to 15 years.          Problem list:  #Sepsis with tachycardia, fever spikes, lactic acidosis secondary to spontaneous bacterial peritonitis    - Tolerated diagnostic and therapeutic paracentesis last 7/10/2025  - Drained 660 mL of cloudy ascitic fluid  - Differential study showing significant neutrophil count  - Elevated procalcitonin  - Fortunately clinically improving with no fever spikes this morning.  Decreasing tachycardia  - Will continue current IV antibiotics with ceftriaxone 2 g daily  - Follow infectious markers and cultures  - GI service following with us    #Acute kidney injury  #Persistent hyperkalemia  #High suspicion for contrast IV nephropathy    - Highly appreciate input from nephrology service  -Hyperkalemia resolved  -ISABELLE resolving with improvement with his serum creatinine  -I elected to discontinue patient's IV fluids this morning given tolerance of oral diet, resolution of ISABELLE and hyper disposition for hypervolemia in the setting of his liver cirrhosis  -Avoid NSAIDs  - Avoid nephrotoxic agents  - Will await further instructions and recommendations from nephrology service  - May have some component of hepatorenal given newly diagnosed liver cirrhosis      #Abdominal pain, jaundice  #Newly diagnosed possible liver cirrhosis  #Suspected cholecystitis, no clear evidence of choledocholithiasis  #Possible alcoholic gastritis  #Alcohol abuse, alcohol dependence  .      Alcohol use  High risk for alcohol withdrawals  Reports drinking 3-4 hard liquor drinks each day. Denies ever going through withdrawal. Does have some  tachycardia, may be related to above issues but will monitor on CIWA with symptom triggered benzos for now.   - CIWA monitoring with symptom triggered benzodiazepines  - Gabapentin taper  - Thiamine and folate replacement  Extensive discussion ensued regarding the need and importance of complete EtOH cessation in the setting of this findings and diagnosis of liver cirrhosis, hepatitis, jaundice and now with concomitant kidney injury          Diet: 3 Gram Potassium (low potassium) Diet    DVT Prophylaxis: Pneumatic Compression Devices and Ambulate every shift  Starr Catheter: Not present  Lines: None     Cardiac Monitoring: ACTIVE order. Indication: Tachyarrhythmias, acute (48 hours)  Code Status: Full Code      Clinically Significant Risk Factors        # Hyperkalemia: Highest K = 6.3 mmol/L in last 2 days, will monitor as appropriate  # Hyponatremia: Lowest Na = 133 mmol/L in last 2 days, will monitor as appropriate  # Hypochloremia: Lowest Cl = 93 mmol/L in last 2 days, will monitor as appropriate      # Hypoalbuminemia: Lowest albumin = 3.1 g/dL at 7/11/2025  5:49 AM, will monitor as appropriate  # Coagulation Defect: INR = 2.03 (Ref range: 0.85 - 1.15) and/or PTT = N/A, will monitor for bleeding  # Thrombocytopenia: Lowest platelets = 60 in last 2 days, will monitor for bleeding                # Obesity: Estimated body mass index is 34.59 kg/m  as calculated from the following:    Height as of this encounter: 1.829 m (6').    Weight as of this encounter: 115.7 kg (255 lb 1.2 oz)., PRESENT ON ADMISSION            Social Drivers of Health     Received from [facility]    Social Connections          Disposition Plan     Medically Ready for Discharge: Anticipated in 2-4 Days            Irvin Andrea MD, MD  Hospitalist Service  [facility]  Securely message with [employer] (more info)  Text page via [employer] Paging/Directory    ______________________________________________________________________    Interval History   Continuing medicine service care.    Seen and examined.  Case discussed with nursing service.  Family updated this patient's wife present at bedside.    Afebrile this morning.    Able to tolerate oral diet yesterday.  No reported nausea or vomiting.  No BM yet.  Voiding freely.    No escalation of oxygen needs.  Still having intermittent abdominal discomfort and pain.    No bleeding tendencies.    Ambulatory.            Physical Exam   Vital Signs: Temp: 99.2  F (37.3  C) Temp src: Temporal BP: 131/76 Pulse: 119   Resp: 20 SpO2: 93 % O2 Device: Nasal cannula Oxygen Delivery: 2 LPM  Weight: 255 lbs 1.16 oz    HEENT; Atraumatic, normocephalic, pinkish conjuctiva, pupils bilateral reactive   Icteric sclerae  Skin: warm and moist, no rashes  Jaundice  Lymphatics: no cervical or axillary lymphandenopathy  Lungs: equal chest expansion, clear to auscultation, no wheezes, no stridor, no crackles,   Heart: Tachycardic rate, normal rhythm, no rubs or gallops.   Abdomen: normal bowel sounds, no guarding, no peritoneal signs, some mild tenderness upon palpation of the right upper quadrant  Extremities: no deformities, no edema   Neuro; follow commands, alert and oriented x3, spontaneous speech, coherent, moves all extremities spontaneously  Psych; no hallucination, euthymic mood, not agitated      Medical Decision Making       50 MINUTES SPENT BY ME on the date of service doing chart review, history, exam, documentation & further activities per the note.  MANAGEMENT DISCUSSED with the following over the past 24 hours: Yes   NOTE(S)/MEDICAL RECORDS REVIEWED over the past 24 hours: Yes      Data     I have personally reviewed the following data over the past 24 hrs:    6.6  \   11.4 (L)   / 60 (L)     134 (L) 96 (L) 23.1 (H) /  129 (H)   3.7 24 0.98 \     ALT: 28 AST: 85 (H) AP: 107 TBILI: 11.2 (H)   ALB: 3.1 (L) TOT PROTEIN: 7.0  LIPASE: N/A     Procal: N/A CRP: N/A Lactic Acid: 2.2 (H)       INR:  2.03 (H) PTT:  N/A   D-dimer:  N/A Fibrinogen:  N/A       Imaging results reviewed over the past 24 hrs:   Recent Results (from the past 24 hours)   US Abdomen Limited w Abdomen Doppler Limited    Narrative    EXAM: US ABDOMEN LIMITED W ABDOMEN DOPPLER LIMITED  LOCATION: [facility]  DATE: 7/10/2025    INDICATION: Elevated LFTs.  COMPARISON: Abdominal MRI 7/9/2025. Abdominal ultrasound and CT 7/8/2025  TECHNIQUE: Limited abdominal ultrasound. Color flow with spectral Doppler and waveform analysis performed.     FINDINGS: Technically challenging exam due to body habitus and bowel gas.    GALLBLADDER: Limited gallbladder visualization. The gallbladder sludge and stones seen on comparison MRI are not well visualized on this exam. No significant gallbladder thickening. The ultrasonographer reports a negative sonographic Vidales sign.     BILE DUCTS: The extra hepatic duct is obscured by bowel gas.    LIVER: Increased echogenicity from diffuse hepatic steatosis. No definite focal mass within the limitations of this exam.     RIGHT KIDNEY: No hydronephrosis.     PANCREAS: The pancreas is largely obscured by overlying gas.    No significant ascites, although a trace amount was present on yesterday's MRI.    ABDOMINAL DUPLEX: This exam is nondiagnostic for evaluation of the hepatic vasculature due to bowel gas and difficulty penetrating the liver due to the degree of steatosis.      Impression    IMPRESSION:  1.  Technically challenging and limited exam.  2.  Hepatic steatosis.  3.  Nondiagnostic evaluation of the hepatic vasculature. The portal and hepatic veins were patent on 7/8/2025 CT. They also appeared patent on yesterday's MRI. If there is continued concern for portal and/or hepatic vein pathology, multiphasic CT would   be recommended.       US Paracentesis without Albumin    Narrative    EXAM:  1. PARACENTESIS  2. ULTRASOUND  GUIDANCE  LOCATION: [facility]  DATE: 7/10/2025    INDICATION: Ascites.    PROCEDURE: Informed consent obtained. Time out performed. The abdomen was prepped and draped in a sterile fashion. 10 mL of 1% lidocaine was infused into local soft tissues. A 5 Spanish catheter system was introduced into the abdominal ascites under   ultrasound guidance.    660 liters of cloudy pinkish fluid were removed and sent to lab if requested.    Patient tolerated procedure well.    Ultrasound imaging was obtained and placed in the patient's permanent medical record.      Impression    IMPRESSION:  1.  Status post ultrasound-guided paracentesis.

## 2025-07-12 LAB
ALBUMIN SERPL BCG-MCNC: 3.5 G/DL (ref 3.5–5.2)
ALP SERPL-CCNC: 140 U/L (ref 40–150)
ALT SERPL W P-5'-P-CCNC: 32 U/L (ref 0–70)
ANION GAP SERPL CALCULATED.3IONS-SCNC: 14 MMOL/L (ref 7–15)
AST SERPL W P-5'-P-CCNC: 104 U/L (ref 0–45)
BILIRUB SERPL-MCNC: 11.3 MG/DL
BUN SERPL-MCNC: 14.2 MG/DL (ref 6–20)
CALCIUM SERPL-MCNC: 9.3 MG/DL (ref 8.8–10.4)
CHLORIDE SERPL-SCNC: 95 MMOL/L (ref 98–107)
CREAT SERPL-MCNC: 0.71 MG/DL (ref 0.67–1.17)
EGFRCR SERPLBLD CKD-EPI 2021: >90 ML/MIN/1.73M2
ERYTHROCYTE [DISTWIDTH] IN BLOOD BY AUTOMATED COUNT: 13.6 % (ref 10–15)
GLUCOSE BLDC GLUCOMTR-MCNC: 116 MG/DL (ref 70–99)
GLUCOSE BLDC GLUCOMTR-MCNC: 126 MG/DL (ref 70–99)
GLUCOSE BLDC GLUCOMTR-MCNC: 127 MG/DL (ref 70–99)
GLUCOSE BLDC GLUCOMTR-MCNC: 96 MG/DL (ref 70–99)
GLUCOSE SERPL-MCNC: 104 MG/DL (ref 70–99)
HCO3 SERPL-SCNC: 24 MMOL/L (ref 22–29)
HCT VFR BLD AUTO: 35.1 % (ref 40–53)
HGB BLD-MCNC: 12.1 G/DL (ref 13.3–17.7)
INR PPP: 1.69 (ref 0.85–1.15)
MCH RBC QN AUTO: 35 PG (ref 26.5–33)
MCHC RBC AUTO-ENTMCNC: 34.5 G/DL (ref 31.5–36.5)
MCV RBC AUTO: 101 FL (ref 78–100)
PLATELET # BLD AUTO: 58 10E3/UL (ref 150–450)
POTASSIUM SERPL-SCNC: 3.8 MMOL/L (ref 3.4–5.3)
PROT SERPL-MCNC: 7.4 G/DL (ref 6.4–8.3)
PROTHROMBIN TIME: 19.8 SECONDS (ref 11.8–14.8)
RBC # BLD AUTO: 3.46 10E6/UL (ref 4.4–5.9)
SODIUM SERPL-SCNC: 133 MMOL/L (ref 135–145)
WBC # BLD AUTO: 4.6 10E3/UL (ref 4–11)

## 2025-07-12 PROCEDURE — 85027 COMPLETE CBC AUTOMATED: CPT | Performed by: INTERNAL MEDICINE

## 2025-07-12 PROCEDURE — 250N000013 HC RX MED GY IP 250 OP 250 PS 637: Performed by: INTERNAL MEDICINE

## 2025-07-12 PROCEDURE — 120N000001 HC R&B MED SURG/OB

## 2025-07-12 PROCEDURE — 85610 PROTHROMBIN TIME: CPT | Performed by: PHYSICIAN ASSISTANT

## 2025-07-12 PROCEDURE — 36415 COLL VENOUS BLD VENIPUNCTURE: CPT | Performed by: PHYSICIAN ASSISTANT

## 2025-07-12 PROCEDURE — 250N000013 HC RX MED GY IP 250 OP 250 PS 637: Performed by: STUDENT IN AN ORGANIZED HEALTH CARE EDUCATION/TRAINING PROGRAM

## 2025-07-12 PROCEDURE — 99233 SBSQ HOSP IP/OBS HIGH 50: CPT | Performed by: INTERNAL MEDICINE

## 2025-07-12 PROCEDURE — 84155 ASSAY OF PROTEIN SERUM: CPT | Performed by: INTERNAL MEDICINE

## 2025-07-12 PROCEDURE — 250N000011 HC RX IP 250 OP 636: Performed by: INTERNAL MEDICINE

## 2025-07-12 RX ORDER — ALBUMIN (HUMAN) 12.5 G/50ML
12.5 SOLUTION INTRAVENOUS ONCE
Status: DISPENSED | OUTPATIENT
Start: 2025-07-12

## 2025-07-12 RX ADMIN — GABAPENTIN 600 MG: 300 CAPSULE ORAL at 12:37

## 2025-07-12 RX ADMIN — POLYETHYLENE GLYCOL 3350 17 G: 17 POWDER, FOR SOLUTION ORAL at 08:53

## 2025-07-12 RX ADMIN — OXYCODONE HYDROCHLORIDE 5 MG: 5 TABLET ORAL at 06:17

## 2025-07-12 RX ADMIN — GABAPENTIN 600 MG: 300 CAPSULE ORAL at 20:09

## 2025-07-12 RX ADMIN — GABAPENTIN 900 MG: 300 CAPSULE ORAL at 08:54

## 2025-07-12 RX ADMIN — PANTOPRAZOLE SODIUM 40 MG: 40 TABLET, DELAYED RELEASE ORAL at 15:57

## 2025-07-12 RX ADMIN — FOLIC ACID 1 MG: 1 TABLET ORAL at 08:54

## 2025-07-12 RX ADMIN — FEXOFENADINE HYDROCHLORIDE 60 MG: 60 TABLET ORAL at 08:53

## 2025-07-12 RX ADMIN — PANTOPRAZOLE SODIUM 40 MG: 40 TABLET, DELAYED RELEASE ORAL at 06:17

## 2025-07-12 RX ADMIN — THIAMINE HCL TAB 100 MG 100 MG: 100 TAB at 08:53

## 2025-07-12 RX ADMIN — CEFTRIAXONE 2 G: 2 INJECTION, POWDER, FOR SOLUTION INTRAMUSCULAR; INTRAVENOUS at 08:54

## 2025-07-12 RX ADMIN — DOCUSATE SODIUM 100 MG: 100 CAPSULE, LIQUID FILLED ORAL at 08:54

## 2025-07-12 RX ADMIN — HYDROMORPHONE HYDROCHLORIDE 0.2 MG: 0.2 INJECTION, SOLUTION INTRAMUSCULAR; INTRAVENOUS; SUBCUTANEOUS at 23:31

## 2025-07-12 RX ADMIN — DOCUSATE SODIUM 100 MG: 100 CAPSULE, LIQUID FILLED ORAL at 20:10

## 2025-07-12 ASSESSMENT — ACTIVITIES OF DAILY LIVING (ADL)
ADLS_ACUITY_SCORE: 20

## 2025-07-12 NOTE — PLAN OF CARE
"Goal Outcome Evaluation:      Plan of Care Reviewed With: patient, spouse    Overall Patient Progress: improvingOverall Patient Progress: improving    Outcome Evaluation: Tolerates room air when upright. Requires oxygen laying down. PRN analgesics given to improve breathing. Tends to be shallow d/t RUQ pain. No growth noted from paracentesis. Kidney back to baseline fx. Nephrology signed off. GI still following. D/C pending cultures.          Problem: Adult Inpatient Plan of Care  Goal: Plan of Care Review  Description: The Plan of Care Review/Shift note should be completed every shift.  The Outcome Evaluation is a brief statement about your assessment that the patient is improving, declining, or no change.  This information will be displayed automatically on your shift  note.  Outcome: Progressing  Flowsheets (Taken 7/11/2025 1900)  Outcome Evaluation: Tolerates room air when upright. Requires oxygen laying down. PRN analgesics given to improve breathing. Tends to be shallow d/t RUQ pain. No growth noted from paracentesis. Kidney back to baseline fx. Nephrology signed off. GI still following. D/C pending cultures.  Plan of Care Reviewed With:   patient   spouse  Overall Patient Progress: improving  Goal: Patient-Specific Goal (Individualized)  Description: You can add care plan individualizations to a care plan. Examples of Individualization might be:  \"Parent requests to be called daily at 9am for status\", \"I have a hard time hearing out of my right ear\", or \"Do not touch me to wake me up as it startles  me\".  Outcome: Progressing  Goal: Absence of Hospital-Acquired Illness or Injury  Outcome: Progressing  Intervention: Identify and Manage Fall Risk  Recent Flowsheet Documentation  Taken 7/11/2025 8022 by Paco Ng RN  Safety Promotion/Fall Prevention:   activity supervised   assistive device/personal items within reach   increased rounding and observation   increase visualization of patient   nonskid " shoes/slippers when out of bed   safety round/check completed   room organization consistent   clutter free environment maintained   patient and family education   lighting adjusted  Intervention: Prevent Skin Injury  Recent Flowsheet Documentation  Taken 7/11/2025 0730 by Paco Ng RN  Body Position: position changed independently  Intervention: Prevent and Manage VTE (Venous Thromboembolism) Risk  Recent Flowsheet Documentation  Taken 7/11/2025 0730 by Paco Ng RN  VTE Prevention/Management: patient refused intervention  Goal: Optimal Comfort and Wellbeing  Outcome: Progressing  Intervention: Monitor Pain and Promote Comfort  Recent Flowsheet Documentation  Taken 7/11/2025 1642 by Paco gN RN  Pain Management Interventions: declines  Taken 7/11/2025 0900 by Paco Ng RN  Pain Management Interventions:   medication (see MAR)   quiet environment facilitated   rest  Goal: Readiness for Transition of Care  Outcome: Progressing     Problem: Pain Acute  Goal: Optimal Pain Control and Function  Outcome: Progressing  Intervention: Develop Pain Management Plan  Recent Flowsheet Documentation  Taken 7/11/2025 1642 by Paco Ng RN  Pain Management Interventions: declines  Taken 7/11/2025 0900 by Paco Ng RN  Pain Management Interventions:   medication (see MAR)   quiet environment facilitated   rest  Intervention: Prevent or Manage Pain  Recent Flowsheet Documentation  Taken 7/11/2025 0730 by Paco Ng RN  Medication Review/Management: medications reviewed     Problem: Electrolyte Imbalance  Goal: Electrolyte Balance  Outcome: Progressing     Problem: Excessive Substance Use  Goal: Optimized Energy Level (Excessive Substance Use)  Outcome: Progressing  Goal: Improved Behavioral Control (Excessive Substance Use)  Outcome: Progressing  Goal: Increased Participation and Engagement (Excessive Substance Use)  Outcome: Progressing  Goal: Improved Physiologic  Symptoms (Excessive Substance Use)  Outcome: Progressing  Goal: Enhanced Social, Occupational or Functional Skills (Excessive Substance Use)  Outcome: Progressing

## 2025-07-12 NOTE — PLAN OF CARE
"Goal Outcome Evaluation:      Plan of Care Reviewed With: patient, spouse    Overall Patient Progress: improvingOverall Patient Progress: improving             [id number]  CIWA 0 overnight. RA while awake, 0.5 LO2 NC while resting. Tele [id number]    Oxy 5 [id number]    Problem: Adult Inpatient Plan of Care  Goal: Plan of Care Review  Description: The Plan of Care Review/Shift note should be completed every shift.  The Outcome Evaluation is a brief statement about your assessment that the patient is improving, declining, or no change.  This information will be displayed automatically on your shift  note.  Outcome: Progressing  Flowsheets (Taken 7/12/2025 0346)  Plan of Care Reviewed With:   patient   spouse  Overall Patient Progress: improving  Goal: Patient-Specific Goal (Individualized)  Description: You can add care plan individualizations to a care plan. Examples of Individualization might be:  \"Parent requests to be called daily at 9am for status\", \"I have a hard time hearing out of my right ear\", or \"Do not touch me to wake me up as it startles  me\".  Outcome: Progressing  Goal: Absence of Hospital-Acquired Illness or Injury  Outcome: Progressing  Intervention: Identify and Manage Fall Risk  Recent Flowsheet Documentation  Taken 7/11/2025 2052 by Sukhi Thurman, RN  Safety Promotion/Fall Prevention:   activity supervised   assistive device/personal items within reach   increased rounding and observation   increase visualization of patient   nonskid shoes/slippers when out of bed   safety round/check completed   room organization consistent   clutter free environment maintained   patient and family education   lighting adjusted  Intervention: Prevent Skin Injury  Recent Flowsheet Documentation  Taken 7/11/2025 2052 by Sukhi Thurman, RN  Body Position: position changed independently  Intervention: Prevent Infection  Recent Flowsheet Documentation  Taken 7/11/2025 2052 by Sukhi Thurman, RN  Infection Prevention:   " single patient room provided   rest/sleep promoted   cohorting utilized  Goal: Optimal Comfort and Wellbeing  Outcome: Progressing  Goal: Readiness for Transition of Care  Outcome: Progressing     Problem: Pain Acute  Goal: Optimal Pain Control and Function  Outcome: Progressing  Intervention: Prevent or Manage Pain  Recent Flowsheet Documentation  Taken 7/11/2025 2052 by Sukhi Thurman RN  Medication Review/Management: medications reviewed     Problem: Electrolyte Imbalance  Goal: Electrolyte Balance  Outcome: Progressing     Problem: Excessive Substance Use  Goal: Optimized Energy Level (Excessive Substance Use)  Outcome: Progressing  Goal: Improved Behavioral Control (Excessive Substance Use)  Outcome: Progressing  Goal: Increased Participation and Engagement (Excessive Substance Use)  Outcome: Progressing  Goal: Improved Physiologic Symptoms (Excessive Substance Use)  Outcome: Progressing  Goal: Enhanced Social, Occupational or Functional Skills (Excessive Substance Use)  Outcome: Progressing

## 2025-07-12 NOTE — PLAN OF CARE
"Goal Outcome Evaluation:      Plan of Care Reviewed With: patient, spouse    Overall Patient Progress: improvingOverall Patient Progress: improving    Outcome Evaluation: Patient endorses more pain in legs d/t stiffness than with abdomen and breathing. No PRN given today. GI and hospitalist recommending rehab for ETOH. GI ordered another paracentesis. Albumin placed in 6th floor lockbox. Parameters for administration in order.      Problem: Adult Inpatient Plan of Care  Goal: Plan of Care Review  Description: The Plan of Care Review/Shift note should be completed every shift.  The Outcome Evaluation is a brief statement about your assessment that the patient is improving, declining, or no change.  This information will be displayed automatically on your shift  note.  Outcome: Progressing  Flowsheets (Taken 7/12/2025 7233)  Outcome Evaluation: Patient endorses more pain in legs d/t stiffness than with abdomen and breathing. No PRN given today. GI and hospitalist recommending rehab for ETOH. GI ordered another paracentesis. Albumin placed in 6th floor lockbox. Parameters for administration in order.  Plan of Care Reviewed With:   patient   spouse  Overall Patient Progress: improving  Goal: Patient-Specific Goal (Individualized)  Description: You can add care plan individualizations to a care plan. Examples of Individualization might be:  \"Parent requests to be called daily at 9am for status\", \"I have a hard time hearing out of my right ear\", or \"Do not touch me to wake me up as it startles  me\".  Outcome: Progressing  Goal: Absence of Hospital-Acquired Illness or Injury  Outcome: Progressing  Intervention: Identify and Manage Fall Risk  Recent Flowsheet Documentation  Taken 7/12/2025 5708 by Paco Ng RN  Safety Promotion/Fall Prevention:   nonskid shoes/slippers when out of bed   safety round/check completed   clutter free environment maintained   patient and family education   room near nurse's " station  Goal: Optimal Comfort and Wellbeing  Outcome: Progressing  Intervention: Monitor Pain and Promote Comfort  Recent Flowsheet Documentation  Taken 7/12/2025 0900 by Paco Ng RN  Pain Management Interventions: declines  Goal: Readiness for Transition of Care  Outcome: Progressing     Problem: Pain Acute  Goal: Optimal Pain Control and Function  Outcome: Progressing  Intervention: Develop Pain Management Plan  Recent Flowsheet Documentation  Taken 7/12/2025 0900 by Paco Ng RN  Pain Management Interventions: declines  Intervention: Prevent or Manage Pain  Recent Flowsheet Documentation  Taken 7/12/2025 0748 by Paco Ng RN  Medication Review/Management: medications reviewed     Problem: Electrolyte Imbalance  Goal: Electrolyte Balance  Outcome: Progressing     Problem: Excessive Substance Use  Goal: Optimized Energy Level (Excessive Substance Use)  Outcome: Progressing  Goal: Improved Behavioral Control (Excessive Substance Use)  Outcome: Progressing  Goal: Increased Participation and Engagement (Excessive Substance Use)  Outcome: Progressing  Goal: Improved Physiologic Symptoms (Excessive Substance Use)  Outcome: Progressing  Goal: Enhanced Social, Occupational or Functional Skills (Excessive Substance Use)  Outcome: Progressing

## 2025-07-12 NOTE — PROGRESS NOTES
[facility]    Medicine Progress Note - Hospitalist Service    Date of Admission:  7/8/2025    Assessment & Plan      Ricci Brown is a 35 year old male admitted on 7/8/2025. He has little past medical history who presents to the ED for evaluation of abdominal pain.   Terence stated that unfortunately he has been regular EtOH drinker at least for the last 10 to 15 years.          Problem list:  #Sepsis with tachycardia, fever spikes, lactic acidosis secondary to spontaneous bacterial peritonitis    - Tolerated diagnostic and therapeutic paracentesis last 7/10/2025  - Drained 660 mL of cloudy ascitic fluid  - Differential study showing significant neutrophil count  - Elevated procalcitonin  - Fortunately clinically improving with no fever spikes this morning.  Decreasing tachycardia  - Will continue current IV antibiotics with ceftriaxone 2 g daily  - Follow infectious markers and cultures  - GI service following with us  - Appreciate input from GI service.  - GI intending to have repeat paracentesis tomorrow per response with underlying SBP    #Acute kidney injury secondary to underlying IV contrast nephropathy  #Persistent hyperkalemia  #High suspicion for contrast IV nephropathy  #Doubtful for hepatorenal syndrome    - Highly appreciate input from nephrology service  -Hyperkalemia resolved  -Albumin level normal.  No hypotension.  -May do trial of diuresis the next couple of days if continues to demonstrate stable kidney function and electrolytes.  I believe patient recovered from recent ISABELLE with underlying contrast nephropathy.  -  -Avoid NSAIDs  - Avoid nephrotoxic agents  - Will await further instructions and recommendations from nephrology service      #Abdominal pain, jaundice  #Newly diagnosed possible liver cirrhosis  #Suspected cholecystitis, no clear evidence of choledocholithiasis  #Possible alcoholic gastritis  #Alcohol abuse, alcohol dependence  .      Alcohol use  High risk  for alcohol withdrawals  Reports drinking 3-4 hard liquor drinks each day. Denies ever going through withdrawal. Does have some tachycardia, may be related to above issues but will monitor on CIWA with symptom triggered benzos for now.   - CIWA monitoring with symptom triggered benzodiazepines  - Gabapentin taper  - Thiamine and folate replacement  Extensive discussion ensued regarding the need and importance of complete EtOH cessation in the setting of this findings and diagnosis of liver cirrhosis, hepatitis, jaundice and now with concomitant kidney injury          Diet: Regular Diet Adult    DVT Prophylaxis: Pneumatic Compression Devices and Ambulate every shift  Starr Catheter: Not present  Lines: None     Cardiac Monitoring: ACTIVE order. Indication: Tachyarrhythmias, acute (48 hours)  Code Status: Full Code      Clinically Significant Risk Factors         # Hyponatremia: Lowest Na = 133 mmol/L in last 2 days, will monitor as appropriate  # Hypochloremia: Lowest Cl = 95 mmol/L in last 2 days, will monitor as appropriate      # Hypoalbuminemia: Lowest albumin = 3.1 g/dL at 7/11/2025  5:49 AM, will monitor as appropriate  # Coagulation Defect: INR = 1.69 (Ref range: 0.85 - 1.15) and/or PTT = N/A, will monitor for bleeding  # Thrombocytopenia: Lowest platelets = 58 in last 2 days, will monitor for bleeding                # Obesity: Estimated body mass index is 34.59 kg/m  as calculated from the following:    Height as of this encounter: 1.829 m (6').    Weight as of this encounter: 115.7 kg (255 lb 1.2 oz)., PRESENT ON ADMISSION            Social Drivers of Health     Received from [employer] [facility]    Social Connections          Disposition Plan     Medically Ready for Discharge: Anticipated in 2-4 Days            Irvin Andrea MD, MD  Hospitalist Service  [facility]  Securely message with [employer] (more info)  Text page via [employer] Paging/Directory    ______________________________________________________________________    Interval History   Continuing medicine service care.    Seen and examined.  Case discussed with nursing service.  Family updated this patient's wife present at bedside.    Terence appears to be improving.  He thinks he is getting better.  He is getting more hungry and appetite is increasing.  He is able to tolerate oral diet.  No reported nausea or vomiting.  No diarrhea.  No bleeding tendencies.  No reports of any alteration in mental state overnight remained afebrile.  Currently not requiring any oxygen support.  Remained ambulatory.        Physical Exam   Vital Signs: Temp: 98.7  F (37.1  C) Temp src: Temporal BP: 137/84 Pulse: 109   Resp: 18 SpO2: (!) 90 % O2 Device: None (Room air) Oxygen Delivery: 1/2 LPM  Weight: 255 lbs 1.16 oz    HEENT; Atraumatic, normocephalic, pinkish conjuctiva, pupils bilateral reactive   Icteric sclerae  Skin: warm and moist, no rashes  Jaundice  Lymphatics: no cervical or axillary lymphandenopathy  Lungs: equal chest expansion, clear to auscultation, no wheezes, no stridor, no crackles,   Heart: Tachycardic rate, normal rhythm, no rubs or gallops.   Abdomen: normal bowel sounds, no guarding, no peritoneal signs, some mild tenderness upon palpation of the right upper quadrant  Extremities: no deformities, no edema   Neuro; follow commands, alert and oriented x3, spontaneous speech, coherent, moves all extremities spontaneously  Psych; no hallucination, euthymic mood, not agitated      Medical Decision Making       50 MINUTES SPENT BY ME on the date of service doing chart review, history, exam, documentation & further activities per the note.  MANAGEMENT DISCUSSED with the following over the past 24 hours: yes   NOTE(S)/MEDICAL RECORDS REVIEWED over the past 24 hours: yes      Data     I have personally reviewed the following data over the past 24 hrs:    4.6  \   12.1 (L)   / 58 (L)     133 (L) 95 (L) 14.2 /   104 (H)   3.8 24 0.71 \     ALT: 32 AST: 104 (H) AP: 140 TBILI: 11.3 (H)   ALB: 3.5 TOT PROTEIN: 7.4 LIPASE: N/A     INR:  1.69 (H) PTT:  N/A   D-dimer:  N/A Fibrinogen:  N/A       Imaging results reviewed over the past 24 hrs:   No results found for this or any previous visit (from the past 24 hours).

## 2025-07-12 NOTE — PROGRESS NOTES
GASTROENTEROLOGY PROGRESS NOTE     SUBJECTIVE: No new concerns, Feeling full with solid foods.      OBJECTIVE:  /84 (BP Location: Left arm)   Pulse 109   Temp 98.7  F (37.1  C) (Temporal)   Resp 18   Ht 1.829 m (6')   Wt 115.7 kg (255 lb 1.2 oz)   SpO2 (!) 90%   BMI 34.59 kg/m    Temp (24hrs), Av.6  F (37  C), Min:97.9  F (36.6  C), Max:99.2  F (37.3  C)    Patient Vitals for the past 72 hrs:   Weight   07/10/25 0809 115.7 kg (255 lb 1.2 oz)     No intake or output data in the 24 hours ending 25 1251     PHYSICAL EXAM  Gen: alert, oriented, NAD  Skin: Jaundice  Eyes: Icterus   Abd: distended, firm soft, +BS, mild focal RUQ pain.        Additional Comments:  ROS, FH, SH: See initial GI consult for details.     I have reviewed the patient's new clinical lab results:     Recent Labs   Lab Test 25  0625 25  0549 07/10/25  0647   WBC 4.6 6.6 10.4   HGB 12.1* 11.4* 12.0*   * 102* 104*   PLT 58* 60* 69*   INR 1.69* 2.03*  --      Recent Labs   Lab Test 25  0625 25  0549 07/10/25  0647   POTASSIUM 3.8 3.7 4.7   CHLORIDE 95* 96* 93*   CO2 24 24 26   BUN 14.2 23.1* 27.6*   ANIONGAP 14 14 14     Recent Labs   Lab Test 25  0625 25  0549 07/10/25  1345 07/10/25  0647 25  0811 25  1611 25  1512   ALBUMIN 3.5 3.1*  --  3.4*   < >  --  4.4   BILITOTAL 11.3* 11.2*  --  12.2*   < >  --  5.9*   ALT 32 28  --  35   < >  --  64   * 85*  --  97*   < >  --  231*   PROTEIN  --   --  30*  --   --  50*  --    LIPASE  --   --   --   --   --   --  38    < > = values in this interval not displayed.     Creatinine   Date Value Ref Range Status   2025 0.71 0.67 - 1.17 mg/dL Final   Lab 6 3  MELD 3.0: 23 at 2025  6:25 AM  MELD-Na: 24 at 2025  6:25 AM  Calculated from:  Serum Creatinine: 0.71 mg/dL (Using min of 1 mg/dL) at 2025  6:25 AM  Serum Sodium: 133 mmol/L at 2025  6:25 AM  Total Bilirubin: 11.3 mg/dL at 2025  6:25  AM  Serum Albumin: 3.5 g/dL at 7/12/2025  6:25 AM  INR(ratio): 1.69 at 7/12/2025  6:25 AM  Age at listing (hypothetical): 35 years  Sex: Male at 7/12/2025  6:25 AM      Imaging:  CT abd/pelvis 7/8:  1.  Cirrhotic morphology of the liver with evidence of portal hypertension and trace abdominopelvic free fluid. No definite drainable ascites at this time.  2.  Superimposed hepatic steatosis is nonspecific but given heterogeneity and subtle surrounding fat stranding, superimposed acute hepatitis is also possible.  3.  Likely cholelithiasis and/or sludge with some subtle fat stranding adjacent to gallbladder, could be related to #'s 1 and 2, but consider further evaluation with right upper quadrant ultrasound if there is clinical concern for acute cholecystitis. No   evidence of biliary obstruction.    RUQ US 7/8:  1.  Few small stones versus sludge in the gallbladder. No wall thickening or free fluid to suggest acute cholecystitis.  2.  Hepatic steatosis.    HIDA 7/9:  FINDINGS: Normal radionuclide activity in liver, gallbladder, bile ducts, and small bowel. No evidence of cystic or common duct obstruction or intrinsic liver disease.                                                                  IMPRESSION:   Negative for acute cholecystitis.    MRCP 7/9:  1.  Cirrhosis with small volume ascites.  2.  No biliary dilatation or choledocholithiasis.  3.  Cholelithiasis.    RUQ US with Doppler 7/10:  1.  Technically challenging and limited exam.  2.  Hepatic steatosis.  3.  Nondiagnostic evaluation of the hepatic vasculature. The portal and hepatic veins were patent on 7/8/2025 CT. They also appeared patent on yesterday's MRI. If there is continued concern for portal and/or hepatic vein pathology, multiphasic CT would   be recommended.     Assessment: 35 year old male without significant past medical history other than chronic moderate/heavy alcohol use admitted 7/8 with symptoms of RUQ abdominal pain. Found to have  elevated liver enzymes and bilirubin, and ISABELLE with CT showing liver cirrhosis with portal hypertension with superimposed hepatic steatosis, gall stones/sludge.    1. RUQ pain: Some concern initially for choledocholithiasis vs cholecystitis. HIDA negative for cholecystitis.  RUQ US Doppler/MRCP negative for portal vein thrombus. Abdominal distention on 7/10, follow-up paracentesis showed absolute neutrophil count >89,000 consistent with SBP. Placed on IV ceftriaxone.  Symptomatically better. Fevers resolved. Blood cultures negative. Peritoneal cultures no growth so far.      2. Liver cirrhosis based on imaging. This is a new diagnosis. History of 10-15 years of moderate/heavy alcohol use (reporting 3-4 hard liquor drinks per day). Alcohol related liver disease most likely.  Liver disease with signs of decompensation with new ascites, SBP, and thrombocytopenia. Labs here most consistent with alcohol related hepatitis. No signs of encephalopathy or GI bleeding.    Hepatitis B/C negative, iron studies negative for iron overload.  US paracentesis  7/10 - 660ml removed.   T bili 5.9 on admit, peaked 7/10 12.2, today trending down 11.2.  7/11: INR 2.03.     3. ISABELLE and hypokalemia: Collected Felt likely pre-renal from contrast, dehydration. HRS felt less likely but considered with liver disease. Nephrology following. Creatinine normalized.      4  Alcohol use. On CIWA but not showing clear signs of withdrawal. Counseled on need for alcohol cessation.     Plan:  --IV ceftriaxone.  --Typically recommend 25% IV albumin (1.5g/kg) day 1 and (1g/kg) day 3 to prevent ISABELLE in setting of SBP, will defer to renal.  --Await final blood and peritoneal cultures.   --Recommend 2g Na restricted diet.   --Repeat US paracentesis with fluid analysis to assess the response to antibiotics. If good response continue IV antibiotics for 5 days and transition to daily Cipro 500 mg indefinitely for prophylaxis.   --Defer diuretics to renal.    --Trend MELD labs.    --Continue thiamine, folic acid, and MVA for three months.  --Complete alcohol cessation.   --Not a candidate for steroids due to SBP  -- Consider chem dependency consult  --Follow-up in liver clinic     Discussed with Dr. Fernandez     Time spent: 20 minutes, greater than 50% of the visit was spent in counseling/coordination of care.     DAVID Moreira, CNP   [facility] ([facility])

## 2025-07-13 ENCOUNTER — APPOINTMENT (OUTPATIENT)
Dept: GENERAL RADIOLOGY | Facility: CLINIC | Age: 35
End: 2025-07-13
Attending: INTERNAL MEDICINE
Payer: COMMERCIAL

## 2025-07-13 ENCOUNTER — APPOINTMENT (OUTPATIENT)
Dept: ULTRASOUND IMAGING | Facility: CLINIC | Age: 35
End: 2025-07-13
Attending: NURSE PRACTITIONER
Payer: COMMERCIAL

## 2025-07-13 LAB
ALBUMIN SERPL BCG-MCNC: 3.6 G/DL (ref 3.5–5.2)
ALP SERPL-CCNC: 155 U/L (ref 40–150)
ALT SERPL W P-5'-P-CCNC: 39 U/L (ref 0–70)
ANION GAP SERPL CALCULATED.3IONS-SCNC: 17 MMOL/L (ref 7–15)
AST SERPL W P-5'-P-CCNC: 114 U/L (ref 0–45)
BILIRUB SERPL-MCNC: 11.4 MG/DL
BUN SERPL-MCNC: 14.9 MG/DL (ref 6–20)
CALCIUM SERPL-MCNC: 9.8 MG/DL (ref 8.8–10.4)
CHLORIDE SERPL-SCNC: 91 MMOL/L (ref 98–107)
CREAT SERPL-MCNC: 0.72 MG/DL (ref 0.67–1.17)
EGFRCR SERPLBLD CKD-EPI 2021: >90 ML/MIN/1.73M2
ERYTHROCYTE [DISTWIDTH] IN BLOOD BY AUTOMATED COUNT: 13.5 % (ref 10–15)
GLUCOSE SERPL-MCNC: 138 MG/DL (ref 70–99)
HCO3 SERPL-SCNC: 25 MMOL/L (ref 22–29)
HCT VFR BLD AUTO: 37 % (ref 40–53)
HGB BLD-MCNC: 13 G/DL (ref 13.3–17.7)
INR PPP: 1.76 (ref 0.85–1.15)
MCH RBC QN AUTO: 35.3 PG (ref 26.5–33)
MCHC RBC AUTO-ENTMCNC: 35.1 G/DL (ref 31.5–36.5)
MCV RBC AUTO: 101 FL (ref 78–100)
PLATELET # BLD AUTO: 71 10E3/UL (ref 150–450)
POTASSIUM SERPL-SCNC: 3.8 MMOL/L (ref 3.4–5.3)
PROCALCITONIN SERPL IA-MCNC: 1.96 NG/ML
PROT SERPL-MCNC: 7.9 G/DL (ref 6.4–8.3)
PROTHROMBIN TIME: 20.4 SECONDS (ref 11.8–14.8)
RBC # BLD AUTO: 3.68 10E6/UL (ref 4.4–5.9)
SODIUM SERPL-SCNC: 133 MMOL/L (ref 135–145)
URATE SERPL-MCNC: 6.7 MG/DL (ref 3.4–7)
WBC # BLD AUTO: 5.7 10E3/UL (ref 4–11)

## 2025-07-13 PROCEDURE — 250N000011 HC RX IP 250 OP 636: Performed by: INTERNAL MEDICINE

## 2025-07-13 PROCEDURE — 73560 X-RAY EXAM OF KNEE 1 OR 2: CPT | Mod: 50

## 2025-07-13 PROCEDURE — 250N000011 HC RX IP 250 OP 636: Performed by: STUDENT IN AN ORGANIZED HEALTH CARE EDUCATION/TRAINING PROGRAM

## 2025-07-13 PROCEDURE — 36415 COLL VENOUS BLD VENIPUNCTURE: CPT | Performed by: PHYSICIAN ASSISTANT

## 2025-07-13 PROCEDURE — 74018 RADEX ABDOMEN 1 VIEW: CPT

## 2025-07-13 PROCEDURE — 99233 SBSQ HOSP IP/OBS HIGH 50: CPT | Performed by: INTERNAL MEDICINE

## 2025-07-13 PROCEDURE — 250N000013 HC RX MED GY IP 250 OP 250 PS 637: Performed by: INTERNAL MEDICINE

## 2025-07-13 PROCEDURE — 49083 ABD PARACENTESIS W/IMAGING: CPT | Mod: 52

## 2025-07-13 PROCEDURE — 80053 COMPREHEN METABOLIC PANEL: CPT | Performed by: INTERNAL MEDICINE

## 2025-07-13 PROCEDURE — 84145 PROCALCITONIN (PCT): CPT | Performed by: INTERNAL MEDICINE

## 2025-07-13 PROCEDURE — 85027 COMPLETE CBC AUTOMATED: CPT | Performed by: INTERNAL MEDICINE

## 2025-07-13 PROCEDURE — 85610 PROTHROMBIN TIME: CPT | Performed by: PHYSICIAN ASSISTANT

## 2025-07-13 PROCEDURE — 99207 PR NO BILLABLE SERVICE THIS VISIT: CPT | Performed by: INTERNAL MEDICINE

## 2025-07-13 PROCEDURE — 120N000001 HC R&B MED SURG/OB

## 2025-07-13 PROCEDURE — 84550 ASSAY OF BLOOD/URIC ACID: CPT | Performed by: INTERNAL MEDICINE

## 2025-07-13 PROCEDURE — 250N000013 HC RX MED GY IP 250 OP 250 PS 637: Performed by: STUDENT IN AN ORGANIZED HEALTH CARE EDUCATION/TRAINING PROGRAM

## 2025-07-13 PROCEDURE — 250N000011 HC RX IP 250 OP 636: Performed by: EMERGENCY MEDICINE

## 2025-07-13 RX ORDER — LORAZEPAM 0.5 MG/1
0.5 TABLET ORAL ONCE
Status: ACTIVE | OUTPATIENT
Start: 2025-07-13

## 2025-07-13 RX ORDER — CYCLOBENZAPRINE HCL 5 MG
5 TABLET ORAL EVERY 8 HOURS PRN
Status: DISPENSED | OUTPATIENT
Start: 2025-07-13

## 2025-07-13 RX ORDER — PROCHLORPERAZINE MALEATE 5 MG/1
10 TABLET ORAL EVERY 6 HOURS PRN
Status: ACTIVE | OUTPATIENT
Start: 2025-07-13

## 2025-07-13 RX ORDER — ACETAMINOPHEN 325 MG/1
650 TABLET ORAL EVERY 6 HOURS PRN
Status: DISPENSED | OUTPATIENT
Start: 2025-07-13

## 2025-07-13 RX ORDER — PROCHLORPERAZINE 25 MG
25 SUPPOSITORY, RECTAL RECTAL EVERY 12 HOURS PRN
Status: ACTIVE | OUTPATIENT
Start: 2025-07-13

## 2025-07-13 RX ADMIN — CYCLOBENZAPRINE 5 MG: 5 TABLET, FILM COATED ORAL at 10:45

## 2025-07-13 RX ADMIN — THIAMINE HCL TAB 100 MG 100 MG: 100 TAB at 08:35

## 2025-07-13 RX ADMIN — DIAZEPAM 5 MG: 5 INJECTION INTRAMUSCULAR; INTRAVENOUS at 17:32

## 2025-07-13 RX ADMIN — PROCHLORPERAZINE EDISYLATE 10 MG: 5 INJECTION INTRAMUSCULAR; INTRAVENOUS at 21:24

## 2025-07-13 RX ADMIN — GABAPENTIN 600 MG: 300 CAPSULE ORAL at 05:42

## 2025-07-13 RX ADMIN — PANTOPRAZOLE SODIUM 40 MG: 40 TABLET, DELAYED RELEASE ORAL at 18:32

## 2025-07-13 RX ADMIN — FOLIC ACID 1 MG: 1 TABLET ORAL at 08:35

## 2025-07-13 RX ADMIN — GABAPENTIN 600 MG: 300 CAPSULE ORAL at 12:14

## 2025-07-13 RX ADMIN — CEFTRIAXONE 2 G: 2 INJECTION, POWDER, FOR SOLUTION INTRAMUSCULAR; INTRAVENOUS at 08:35

## 2025-07-13 RX ADMIN — ACETAMINOPHEN 650 MG: 325 TABLET ORAL at 21:14

## 2025-07-13 RX ADMIN — ONDANSETRON 4 MG: 2 INJECTION, SOLUTION INTRAMUSCULAR; INTRAVENOUS at 16:17

## 2025-07-13 RX ADMIN — PANTOPRAZOLE SODIUM 40 MG: 40 TABLET, DELAYED RELEASE ORAL at 05:43

## 2025-07-13 RX ADMIN — OXYCODONE HYDROCHLORIDE 5 MG: 5 TABLET ORAL at 08:54

## 2025-07-13 RX ADMIN — ACETAMINOPHEN 650 MG: 325 TABLET ORAL at 12:15

## 2025-07-13 RX ADMIN — FEXOFENADINE HYDROCHLORIDE 60 MG: 60 TABLET ORAL at 08:35

## 2025-07-13 RX ADMIN — FAMOTIDINE 20 MG: 10 INJECTION, SOLUTION INTRAVENOUS at 16:07

## 2025-07-13 ASSESSMENT — ACTIVITIES OF DAILY LIVING (ADL)
ADLS_ACUITY_SCORE: 37
ADLS_ACUITY_SCORE: 20
ADLS_ACUITY_SCORE: 37
ADLS_ACUITY_SCORE: 35
ADLS_ACUITY_SCORE: 37
ADLS_ACUITY_SCORE: 37
ADLS_ACUITY_SCORE: 20
ADLS_ACUITY_SCORE: 35
ADLS_ACUITY_SCORE: 37
ADLS_ACUITY_SCORE: 37
ADLS_ACUITY_SCORE: 35
ADLS_ACUITY_SCORE: 20
ADLS_ACUITY_SCORE: 20
ADLS_ACUITY_SCORE: 31
ADLS_ACUITY_SCORE: 20
ADLS_ACUITY_SCORE: 33
ADLS_ACUITY_SCORE: 33
ADLS_ACUITY_SCORE: 31
ADLS_ACUITY_SCORE: 37

## 2025-07-13 NOTE — PLAN OF CARE
"Goal Outcome Evaluation:      Plan of Care Reviewed With: patient, spouse    Overall Patient Progress: decliningOverall Patient Progress: declining    Outcome Evaluation: Patient unable to ambulate today. Prior hx of knee pain. Imaging and labs ordered. New analgesics ordered. Emesis episode. Orders obtained. Will continue to monitor. No indication for paracentesis. Albumin returned. Test cancelled.          Problem: Adult Inpatient Plan of Care  Goal: Plan of Care Review  Description: The Plan of Care Review/Shift note should be completed every shift.  The Outcome Evaluation is a brief statement about your assessment that the patient is improving, declining, or no change.  This information will be displayed automatically on your shift  note.  Outcome: Not Progressing  Flowsheets (Taken 7/13/2025 1446)  Outcome Evaluation: Patient unable to ambulate today. Prior hx of knee pain. Imaging and labs ordered. New analgesics ordered. Emesis episode. Orders obtained. Will continue to monitor. No indication for paracentesis. Albumin returned. Test cancelled.  Plan of Care Reviewed With:   patient   spouse  Overall Patient Progress: declining  Goal: Patient-Specific Goal (Individualized)  Description: You can add care plan individualizations to a care plan. Examples of Individualization might be:  \"Parent requests to be called daily at 9am for status\", \"I have a hard time hearing out of my right ear\", or \"Do not touch me to wake me up as it startles  me\".  Outcome: Not Progressing  Goal: Absence of Hospital-Acquired Illness or Injury  Outcome: Not Progressing  Intervention: Identify and Manage Fall Risk  Recent Flowsheet Documentation  Taken 7/13/2025 1100 by Paco Ng RN  Safety Promotion/Fall Prevention:   activity supervised   assistive device/personal items within reach   nonskid shoes/slippers when out of bed   patient and family education   room near nurse's station   safety round/check " completed  Intervention: Prevent and Manage VTE (Venous Thromboembolism) Risk  Recent Flowsheet Documentation  Taken 7/13/2025 1100 by Paco Ng RN  VTE Prevention/Management: patient refused intervention  Goal: Optimal Comfort and Wellbeing  Outcome: Not Progressing  Intervention: Monitor Pain and Promote Comfort  Recent Flowsheet Documentation  Taken 7/13/2025 1215 by Paco Ng RN  Pain Management Interventions: medication (see MAR)  Taken 7/13/2025 1046 by Paco Ng RN  Pain Management Interventions: medication (see MAR)  Taken 7/13/2025 0854 by Paco Ng RN  Pain Management Interventions: medication (see MAR)  Goal: Readiness for Transition of Care  Outcome: Not Progressing     Problem: Pain Acute  Goal: Optimal Pain Control and Function  Outcome: Not Progressing  Intervention: Develop Pain Management Plan  Recent Flowsheet Documentation  Taken 7/13/2025 1215 by Paco Ng RN  Pain Management Interventions: medication (see MAR)  Taken 7/13/2025 1046 by Paco Ng RN  Pain Management Interventions: medication (see MAR)  Taken 7/13/2025 0854 by Paco Ng RN  Pain Management Interventions: medication (see MAR)  Intervention: Prevent or Manage Pain  Recent Flowsheet Documentation  Taken 7/13/2025 1100 by Paco Ng RN  Medication Review/Management: medications reviewed     Problem: Electrolyte Imbalance  Goal: Electrolyte Balance  Outcome: Not Progressing     Problem: Excessive Substance Use  Goal: Optimized Energy Level (Excessive Substance Use)  Outcome: Not Progressing  Goal: Improved Behavioral Control (Excessive Substance Use)  Outcome: Not Progressing  Goal: Increased Participation and Engagement (Excessive Substance Use)  Outcome: Not Progressing  Goal: Improved Physiologic Symptoms (Excessive Substance Use)  Outcome: Not Progressing  Goal: Enhanced Social, Occupational or Functional Skills (Excessive Substance Use)  Outcome: Not  Progressing

## 2025-07-13 NOTE — PROGRESS NOTES
[facility]    Medicine Progress Note - Hospitalist Service    Date of Admission:  7/8/2025    Assessment & Plan      Ricci Brown is a 35 year old male admitted on 7/8/2025. He has little past medical history who presents to the ED for evaluation of abdominal pain.   Terence stated that unfortunately he has been regular EtOH drinker at least for the last 10 to 15 years.          Problem list:  #Sepsis with tachycardia, fever spikes, lactic acidosis secondary to spontaneous bacterial peritonitis    - Tolerated diagnostic and therapeutic paracentesis last 7/10/2025  - Drained 660 mL of cloudy ascitic fluid  - Differential study showing significant neutrophil count  - Elevated procalcitonin  -Has a pending repeat procalcitonin just for monitoring purposes.  Anticipating improvement given concurrent antibiotics and improving clinical appearance  -GI requesting for repeat paracentesis  -Remain on IV antibiotics currently on ceftriaxone      #Acute kidney injury secondary to underlying IV contrast nephropathy  #Persistent hyperkalemia  #High suspicion for contrast IV nephropathy  #Doubtful for hepatorenal syndrome    - Highly appreciate input from nephrology service  -Hyperkalemia resolved  -Albumin level normal.  No hypotension.  -May do trial of diuresis the next couple of days if continues to demonstrate stable kidney function and electrolytes.  I believe patient recovered from recent ISABELLE with underlying contrast nephropathy.  -  -Avoid NSAIDs  - Avoid nephrotoxic agents  - Will await further instructions and recommendations from nephrology service      #Abdominal pain, jaundice  #Newly diagnosed possible liver cirrhosis  #Suspected cholecystitis, no clear evidence of choledocholithiasis  #Possible alcoholic gastritis  #Alcohol abuse, alcohol dependence  .    #Chronic bilateral knee pain  - Patient mentioned that he has been taking diclofenac on a regular daily basis for years regarding his  bilateral knee pain.  - He mentioned that he was previously ruled out for gouty arthritis  - Will double check uric acid levels.  - Initially he was requesting resumption of his NSAIDs but unfortunately he remained high risk for complications of NSAIDs use given liver cirrhosis, chronic alcoholism, recent ISABELLE.  - Will do trial of as needed APAP without much lower dose threshold.  - As needed Flexeril see if it is going to be helpful for him      Alcohol use  High risk for alcohol withdrawals  Reports drinking 3-4 hard liquor drinks each day. Denies ever going through withdrawal. Does have some tachycardia, may be related to above issues but will monitor on CIWA with symptom triggered benzos for now.   - CIWA monitoring with symptom triggered benzodiazepines  - Gabapentin taper  - Thiamine and folate replacement  Extensive discussion ensued regarding the need and importance of complete EtOH cessation in the setting of this findings and diagnosis of liver cirrhosis, hepatitis, jaundice and now with concomitant kidney injury          Diet: Regular Diet Adult    DVT Prophylaxis: Pneumatic Compression Devices and Ambulate every shift  Starr Catheter: Not present  Lines: None     Cardiac Monitoring: ACTIVE order. Indication: Tachyarrhythmias, acute (48 hours)  Code Status: Full Code      Clinically Significant Risk Factors         # Hyponatremia: Lowest Na = 133 mmol/L in last 2 days, will monitor as appropriate  # Hypochloremia: Lowest Cl = 91 mmol/L in last 2 days, will monitor as appropriate      # Hypoalbuminemia: Lowest albumin = 3.1 g/dL at 7/11/2025  5:49 AM, will monitor as appropriate  # Coagulation Defect: INR = 1.76 (Ref range: 0.85 - 1.15) and/or PTT = N/A, will monitor for bleeding  # Thrombocytopenia: Lowest platelets = 58 in last 2 days, will monitor for bleeding                # Obesity: Estimated body mass index is 34.59 kg/m  as calculated from the following:    Height as of this encounter: 1.829 m  (6').    Weight as of this encounter: 115.7 kg (255 lb 1.2 oz).             Social Drivers of Health     Received from [facility]    Social Connections          Disposition Plan     Medically Ready for Discharge: Anticipated in 2-4 Days            Irvin Andrea MD, MD  Hospitalist Service  [facility]  Securely message with [email or web address] (more info)  Text page via [employer] Paging/Directory   ______________________________________________________________________    Interval History   Continuing medicine service care.    Seen and examined.  Case discussed with nursing service.   Afebrile.  Still not requiring oxygen support.  No alteration mental state.  Has some issues with increasing pain mostly on bilateral knee area, and abdominal discomfort.  Had some emesis last night but no reported bleeding tendencies.  No issues with hematemesis, coffee-ground emesis.  Reportedly had some loose stooling earlier.  But no black tarry stooling or bright red blood per rectum.           Physical Exam   Vital Signs: Temp: 98.2  F (36.8  C) Temp src: Temporal BP: (!) 143/87 Pulse: 115   Resp: 20 SpO2: 95 % O2 Device: None (Room air)    Weight: 255 lbs 1.16 oz    HEENT; Atraumatic, normocephalic, pinkish conjuctiva, pupils bilateral reactive   Icteric sclerae  Skin: warm and moist, no rashes  Jaundice  Lymphatics: no cervical or axillary lymphandenopathy  Lungs: equal chest expansion, clear to auscultation, no wheezes, no stridor, no crackles,   Heart: Improving tachycardic rate, normal rhythm, no rubs or gallops.   Abdomen: normal bowel sounds, no guarding, no peritoneal signs, some mild tenderness upon palpation of the right upper quadrant  Extremities: no deformities, no edema   Neuro; follow commands, alert and oriented x3, spontaneous speech, coherent, moves all extremities spontaneously  Psych; no hallucination, euthymic mood, not agitated      Medical Decision Making        55 MINUTES SPENT BY ME on the date of service doing chart review, history, exam, documentation & further activities per the note.  MANAGEMENT DISCUSSED with the following over the past 24 hours: Yes yes   NOTE(S)/MEDICAL RECORDS REVIEWED over the past 24 hours: yes      Data     I have personally reviewed the following data over the past 24 hrs:    5.7  \   13.0 (L)   / 71 (L)     133 (L) 91 (L) 14.9 /  138 (H)   3.8 25 0.72 \     ALT: 39 AST: 114 (H) AP: 155 (H) TBILI: 11.4 (H)   ALB: 3.6 TOT PROTEIN: 7.9 LIPASE: N/A     INR:  1.76 (H) PTT:  N/A   D-dimer:  N/A Fibrinogen:  N/A       Imaging results reviewed over the past 24 hrs:   No results found for this or any previous visit (from the past 24 hours).

## 2025-07-13 NOTE — PLAN OF CARE
"Goal Outcome Evaluation:      Plan of Care Reviewed With: patient, spouse    Overall Patient Progress: no changeOverall Patient Progress: no change             [id number]  RA overnight Denies any shortness of breath. Significant pain overnight. Significant pain in Bilateral knees, pt stated that it is \"11/10 pain\" 0.2 mg IV dilaudid given at 2331 Refuses prn oxy states \"it doesn't work\". Did have some nausea, however pt stated \"thinks it was something I ate\"    Pt requesting a muscle relaxer this AM   Plan tbd      Problem: Adult Inpatient Plan of Care  Goal: Plan of Care Review  Description: The Plan of Care Review/Shift note should be completed every shift.  The Outcome Evaluation is a brief statement about your assessment that the patient is improving, declining, or no change.  This information will be displayed automatically on your shift  note.  Outcome: Not Progressing  Flowsheets (Taken 7/13/2025 0127)  Plan of Care Reviewed With:   patient   spouse  Overall Patient Progress: no change  Goal: Patient-Specific Goal (Individualized)  Description: You can add care plan individualizations to a care plan. Examples of Individualization might be:  \"Parent requests to be called daily at 9am for status\", \"I have a hard time hearing out of my right ear\", or \"Do not touch me to wake me up as it startles  me\".  Outcome: Not Progressing  Goal: Absence of Hospital-Acquired Illness or Injury  Outcome: Not Progressing  Intervention: Identify and Manage Fall Risk  Recent Flowsheet Documentation  Taken 7/12/2025 2051 by Sukhi Thurman, RN  Safety Promotion/Fall Prevention:   nonskid shoes/slippers when out of bed   safety round/check completed   clutter free environment maintained   patient and family education   room near nurse's station  Intervention: Prevent Skin Injury  Recent Flowsheet Documentation  Taken 7/12/2025 2051 by Sukhi Thurman, RN  Body Position: position changed independently  Intervention: Prevent " Infection  Recent Flowsheet Documentation  Taken 7/12/2025 2051 by Sukhi Thurman RN  Infection Prevention:   hand hygiene promoted   single patient room provided   rest/sleep promoted  Goal: Optimal Comfort and Wellbeing  Outcome: Not Progressing  Intervention: Monitor Pain and Promote Comfort  Recent Flowsheet Documentation  Taken 7/12/2025 2051 by Sukhi Thurman RN  Pain Management Interventions: cold applied  Goal: Readiness for Transition of Care  Outcome: Not Progressing     Problem: Pain Acute  Goal: Optimal Pain Control and Function  Outcome: Not Progressing  Intervention: Develop Pain Management Plan  Recent Flowsheet Documentation  Taken 7/12/2025 2051 by Sukhi Thurman RN  Pain Management Interventions: cold applied  Intervention: Prevent or Manage Pain  Recent Flowsheet Documentation  Taken 7/12/2025 2051 by Sukhi Thurman RN  Medication Review/Management: medications reviewed     Problem: Electrolyte Imbalance  Goal: Electrolyte Balance  Outcome: Not Progressing     Problem: Excessive Substance Use  Goal: Optimized Energy Level (Excessive Substance Use)  Outcome: Not Progressing  Goal: Improved Behavioral Control (Excessive Substance Use)  Outcome: Not Progressing  Goal: Increased Participation and Engagement (Excessive Substance Use)  Outcome: Not Progressing  Goal: Improved Physiologic Symptoms (Excessive Substance Use)  Outcome: Not Progressing  Goal: Enhanced Social, Occupational or Functional Skills (Excessive Substance Use)  Outcome: Not Progressing

## 2025-07-13 NOTE — PROGRESS NOTES
I was notified about vomiting spells.  Stable hemodynamics  Improving infectious markers. Decreasing procalcitonin.  Afebrile earlier.  No reported bleeding symptoms.  Keep NPO except meds and ice chips  Lorazepam oral x 1 dose ( shortage for IV)  Compazine  IV Pepcid earlier  Requested AXR.

## 2025-07-14 ENCOUNTER — APPOINTMENT (OUTPATIENT)
Dept: PHYSICAL THERAPY | Facility: CLINIC | Age: 35
End: 2025-07-14
Attending: INTERNAL MEDICINE
Payer: COMMERCIAL

## 2025-07-14 LAB
ALBUMIN SERPL BCG-MCNC: 3.3 G/DL (ref 3.5–5.2)
ALP SERPL-CCNC: 141 U/L (ref 40–150)
ALT SERPL W P-5'-P-CCNC: 41 U/L (ref 0–70)
ANION GAP SERPL CALCULATED.3IONS-SCNC: 14 MMOL/L (ref 7–15)
AST SERPL W P-5'-P-CCNC: 105 U/L (ref 0–45)
BASOPHILS # BLD MANUAL: 0 10E3/UL (ref 0–0.2)
BASOPHILS NFR BLD MANUAL: 0 %
BILIRUB SERPL-MCNC: 11.7 MG/DL
BUN SERPL-MCNC: 14.1 MG/DL (ref 6–20)
CALCIUM SERPL-MCNC: 9.3 MG/DL (ref 8.8–10.4)
CHLORIDE SERPL-SCNC: 95 MMOL/L (ref 98–107)
CREAT SERPL-MCNC: 0.63 MG/DL (ref 0.67–1.17)
EGFRCR SERPLBLD CKD-EPI 2021: >90 ML/MIN/1.73M2
EOSINOPHIL # BLD MANUAL: 0.1 10E3/UL (ref 0–0.7)
EOSINOPHIL NFR BLD MANUAL: 2 %
ERYTHROCYTE [DISTWIDTH] IN BLOOD BY AUTOMATED COUNT: 13.7 % (ref 10–15)
GLUCOSE SERPL-MCNC: 132 MG/DL (ref 70–99)
HCO3 SERPL-SCNC: 25 MMOL/L (ref 22–29)
HCT VFR BLD AUTO: 33.6 % (ref 40–53)
HGB BLD-MCNC: 11.7 G/DL (ref 13.3–17.7)
INR PPP: 1.95 (ref 0.85–1.15)
LYMPHOCYTES # BLD MANUAL: 0.1 10E3/UL (ref 0.8–5.3)
LYMPHOCYTES NFR BLD MANUAL: 2 %
MCH RBC QN AUTO: 35.5 PG (ref 26.5–33)
MCHC RBC AUTO-ENTMCNC: 34.8 G/DL (ref 31.5–36.5)
MCV RBC AUTO: 102 FL (ref 78–100)
MONOCYTES # BLD MANUAL: 0.4 10E3/UL (ref 0–1.3)
MONOCYTES NFR BLD MANUAL: 7 %
MYELOCYTES # BLD MANUAL: 0.2 10E3/UL
MYELOCYTES NFR BLD MANUAL: 3 %
NEUTROPHILS # BLD MANUAL: 5.5 10E3/UL (ref 1.6–8.3)
NEUTROPHILS NFR BLD MANUAL: 86 %
PLAT MORPH BLD: ABNORMAL
PLATELET # BLD AUTO: 90 10E3/UL (ref 150–450)
POLYCHROMASIA BLD QL SMEAR: SLIGHT
POTASSIUM SERPL-SCNC: 3.5 MMOL/L (ref 3.4–5.3)
PROT SERPL-MCNC: 7.3 G/DL (ref 6.4–8.3)
PROTHROMBIN TIME: 22.1 SECONDS (ref 11.8–14.8)
RBC # BLD AUTO: 3.3 10E6/UL (ref 4.4–5.9)
RBC MORPH BLD: ABNORMAL
SODIUM SERPL-SCNC: 134 MMOL/L (ref 135–145)
TOXIC GRANULES BLD QL SMEAR: PRESENT
WBC # BLD AUTO: 6.4 10E3/UL (ref 4–11)

## 2025-07-14 PROCEDURE — 99233 SBSQ HOSP IP/OBS HIGH 50: CPT | Performed by: INTERNAL MEDICINE

## 2025-07-14 PROCEDURE — 97530 THERAPEUTIC ACTIVITIES: CPT | Mod: GP | Performed by: PHYSICAL THERAPIST

## 2025-07-14 PROCEDURE — 85610 PROTHROMBIN TIME: CPT | Performed by: INTERNAL MEDICINE

## 2025-07-14 PROCEDURE — 85027 COMPLETE CBC AUTOMATED: CPT | Performed by: INTERNAL MEDICINE

## 2025-07-14 PROCEDURE — 97161 PT EVAL LOW COMPLEX 20 MIN: CPT | Mod: GP | Performed by: PHYSICAL THERAPIST

## 2025-07-14 PROCEDURE — 36415 COLL VENOUS BLD VENIPUNCTURE: CPT | Performed by: INTERNAL MEDICINE

## 2025-07-14 PROCEDURE — 80053 COMPREHEN METABOLIC PANEL: CPT | Performed by: INTERNAL MEDICINE

## 2025-07-14 PROCEDURE — 250N000013 HC RX MED GY IP 250 OP 250 PS 637: Performed by: INTERNAL MEDICINE

## 2025-07-14 PROCEDURE — 250N000011 HC RX IP 250 OP 636: Performed by: INTERNAL MEDICINE

## 2025-07-14 PROCEDURE — 85007 BL SMEAR W/DIFF WBC COUNT: CPT | Performed by: INTERNAL MEDICINE

## 2025-07-14 PROCEDURE — 120N000001 HC R&B MED SURG/OB

## 2025-07-14 PROCEDURE — 250N000013 HC RX MED GY IP 250 OP 250 PS 637: Performed by: STUDENT IN AN ORGANIZED HEALTH CARE EDUCATION/TRAINING PROGRAM

## 2025-07-14 PROCEDURE — 97110 THERAPEUTIC EXERCISES: CPT | Mod: GP | Performed by: PHYSICAL THERAPIST

## 2025-07-14 RX ORDER — BISACODYL 10 MG
10 SUPPOSITORY, RECTAL RECTAL DAILY PRN
Status: ACTIVE | OUTPATIENT
Start: 2025-07-14

## 2025-07-14 RX ADMIN — OXYCODONE HYDROCHLORIDE 5 MG: 5 TABLET ORAL at 14:44

## 2025-07-14 RX ADMIN — FEXOFENADINE HYDROCHLORIDE 60 MG: 60 TABLET ORAL at 09:46

## 2025-07-14 RX ADMIN — FOLIC ACID 1 MG: 1 TABLET ORAL at 09:46

## 2025-07-14 RX ADMIN — CEFTRIAXONE 2 G: 2 INJECTION, POWDER, FOR SOLUTION INTRAMUSCULAR; INTRAVENOUS at 08:43

## 2025-07-14 RX ADMIN — CYCLOBENZAPRINE 5 MG: 5 TABLET, FILM COATED ORAL at 10:18

## 2025-07-14 RX ADMIN — HYDROMORPHONE HYDROCHLORIDE 0.2 MG: 0.2 INJECTION, SOLUTION INTRAMUSCULAR; INTRAVENOUS; SUBCUTANEOUS at 04:47

## 2025-07-14 RX ADMIN — PANTOPRAZOLE SODIUM 40 MG: 40 TABLET, DELAYED RELEASE ORAL at 16:58

## 2025-07-14 RX ADMIN — DOCUSATE SODIUM 100 MG: 100 CAPSULE, LIQUID FILLED ORAL at 14:17

## 2025-07-14 RX ADMIN — ACETAMINOPHEN 650 MG: 325 TABLET ORAL at 10:18

## 2025-07-14 RX ADMIN — THIAMINE HCL TAB 100 MG 100 MG: 100 TAB at 09:46

## 2025-07-14 RX ADMIN — GABAPENTIN 300 MG: 300 CAPSULE ORAL at 14:16

## 2025-07-14 RX ADMIN — GABAPENTIN 300 MG: 300 CAPSULE ORAL at 20:25

## 2025-07-14 RX ADMIN — HYDROMORPHONE HYDROCHLORIDE 0.2 MG: 0.2 INJECTION, SOLUTION INTRAMUSCULAR; INTRAVENOUS; SUBCUTANEOUS at 10:22

## 2025-07-14 RX ADMIN — OXYCODONE HYDROCHLORIDE 5 MG: 5 TABLET ORAL at 19:26

## 2025-07-14 ASSESSMENT — ACTIVITIES OF DAILY LIVING (ADL)
ADLS_ACUITY_SCORE: 43
ADLS_ACUITY_SCORE: 37
ADLS_ACUITY_SCORE: 43
ADLS_ACUITY_SCORE: 37
ADLS_ACUITY_SCORE: 37
ADLS_ACUITY_SCORE: 43
ADLS_ACUITY_SCORE: 37
ADLS_ACUITY_SCORE: 37
ADLS_ACUITY_SCORE: 41
ADLS_ACUITY_SCORE: 37
ADLS_ACUITY_SCORE: 37
ADLS_ACUITY_SCORE: 41
ADLS_ACUITY_SCORE: 37
ADLS_ACUITY_SCORE: 43
ADLS_ACUITY_SCORE: 37
ADLS_ACUITY_SCORE: 37
ADLS_ACUITY_SCORE: 43
ADLS_ACUITY_SCORE: 37
ADLS_ACUITY_SCORE: 43
ADLS_ACUITY_SCORE: 37
ADLS_ACUITY_SCORE: 37
ADLS_ACUITY_SCORE: 43
ADLS_ACUITY_SCORE: 37

## 2025-07-14 NOTE — PROVIDER NOTIFICATION
Provider notified regarding weakness. Pt has noted bilateral lower extremity weakness, and limitation due to knee pain. While trying to stand patient this morning some weakness of L arm also note. Pt has spent significant amount of time in bed without mobility of arms or legs.    I followed up with neuro assessment. Weak squeeze on L hand compared to R hand strong. Weak plantar dorsal flex bilateral. Arm and hand coordination intact, pt able to perform finger to nose exam bilaterally. TOÑO. Consistent shoulder shrug. No tongue deviation or facial droop. Denies hearing or vision changes. Without confusion.     Recommended to continue monitoring. MD states CVA unlikely to show as bilateral lower weakness and unilateral upper weakness. I convinced pt to wear PCDs.

## 2025-07-14 NOTE — PLAN OF CARE
Pt is alert and oriented. PRN Dilaudid administered to manage pain. Pt denies any shortness of breath and on room air.    Pt denies any nausea ; no vomiting on this shift; declined to use scheduled Gabapentin as they state that they vomited after they used oral meds during the previous shift.    Pt is 2 assist with a ceiling lift. Ongoing monitoring.    Family at bedside.    Plan: Gastro following.    BP (!) 141/81 (BP Location: Left arm, Patient Position: Semi-Mckinnon's, Cuff Size: Adult Regular)   Pulse 120   Temp 98.8  F (37.1  C) (Temporal)   Resp 16   Ht 1.829 m (6')   Wt 115.7 kg (255 lb 1.2 oz)   SpO2 96%   BMI 34.59 kg/m       Goal Outcome Evaluation:      Plan of Care Reviewed With: patient    Overall Patient Progress: no changeOverall Patient Progress: no change    Outcome Evaluation: Pt denies any ausea or vomiting.

## 2025-07-14 NOTE — PLAN OF CARE
Goal Outcome Evaluation:      Plan of Care Reviewed With: patient    Overall Patient Progress: decliningOverall Patient Progress: declining    Outcome Evaluation: AOx4. Distended abdomen but endorses passing flatus and with active bowel sounds. Initially refused bowel regimin but I will try again. Refused PCDs. Minimal abdomen pain. Pt was nervous about abrupt nausea and vomitting following PO medications, which happened prior, but pt tolerated meds fine. Tolerated Clear liquid diet, Rn to advance (per MD), trialing Full Liquid next. Pt limited by severe pain of knees, which he states is completely resolved with Diclofenac at home, but this med is not currently appropriate. Pt unable to stand on knees, especially his left. Tried standing at bedside x3 with PT without success. Minimal improvement with dilaudid, flexeril, tylenol. Pt also reports min effect of oxycodone used prior to my shift. He is adamant about his Diclofenac. 2/10 pain at rest. CIWA scoring below ativan indication. Skin; coccyx red edin, but ultimately unable to visualize buttox due to mobility and pain limitations. Repositioned in bed with lift. GI following.        Problem: Adult Inpatient Plan of Care  Goal: Plan of Care Review  Description: The Plan of Care Review/Shift note should be completed every shift.  The Outcome Evaluation is a brief statement about your assessment that the patient is improving, declining, or no change.  This information will be displayed automatically on your shift  note.  Outcome: Not Progressing  Flowsheets (Taken 7/14/2025 1343)  Outcome Evaluation:   AOx4. Distended abdomen but endorses passing flatus and with active bowel sounds. Initially refused bowel regimin but I will try again. Refused PCDs. Minimal abdomen pain. Pt was nervous about abrupt nausea and vomitting following PO medications, which happened prior, but pt tolerated meds fine. Tolerated Clear liquid diet, Rn to advance (per MD), trialing Full  "Liquid next. Pt limited by severe pain of knees, which he states is completely resolved with Diclofenac at home, but this med is not currently appropriate. Pt unable to stand on knees, especially his left. Tried standing at bedside x3 with PT without success. Minimal improvement with dilaudid, flexeril, tylenol. Pt also reports min effect of oxycodone used prior to my shift. He is adamant about his Diclofenac. 2/10 pain at rest. CIWA scoring below ativan indication. Skin   coccyx red edin, but ultimately unable to visualize buttox due to mobility and pain limitations. Repositioned in bed with lift. GI following.  Plan of Care Reviewed With: patient  Overall Patient Progress: declining  Goal: Patient-Specific Goal (Individualized)  Description: You can add care plan individualizations to a care plan. Examples of Individualization might be:  \"Parent requests to be called daily at 9am for status\", \"I have a hard time hearing out of my right ear\", or \"Do not touch me to wake me up as it startles  me\".  Outcome: Not Progressing  Goal: Absence of Hospital-Acquired Illness or Injury  Outcome: Not Progressing  Intervention: Identify and Manage Fall Risk  Recent Flowsheet Documentation  Taken 7/14/2025 1146 by Manish Olivas RN  Safety Promotion/Fall Prevention: activity supervised  Taken 7/14/2025 0825 by Manish Olivas RN  Safety Promotion/Fall Prevention:   activity supervised   safety round/check completed  Intervention: Prevent Skin Injury  Recent Flowsheet Documentation  Taken 7/14/2025 1146 by Manish Olivas, RN  Body Position:   turned   left  Taken 7/14/2025 0825 by Manish Olivas, RN  Body Position: supine, head elevated  Goal: Optimal Comfort and Wellbeing  Outcome: Not Progressing  Intervention: Provide Person-Centered Care  Recent Flowsheet Documentation  Taken 7/14/2025 0825 by Manish Olivas RN  Trust Relationship/Rapport:   care explained   choices provided  Goal: Readiness for Transition of " Care  Outcome: Not Progressing     Problem: Pain Acute  Goal: Optimal Pain Control and Function  Outcome: Not Progressing     Problem: Electrolyte Imbalance  Goal: Electrolyte Balance  Outcome: Not Progressing     Problem: Excessive Substance Use  Goal: Optimized Energy Level (Excessive Substance Use)  Outcome: Not Progressing  Goal: Improved Behavioral Control (Excessive Substance Use)  Outcome: Not Progressing  Goal: Increased Participation and Engagement (Excessive Substance Use)  Outcome: Not Progressing  Goal: Improved Physiologic Symptoms (Excessive Substance Use)  Outcome: Not Progressing  Goal: Enhanced Social, Occupational or Functional Skills (Excessive Substance Use)  Outcome: Not Progressing  Intervention: Promote Social, Occupational and Functional Ability  Recent Flowsheet Documentation  Taken 7/14/2025 0028 by Manish Olivas, RN  Trust Relationship/Rapport:   care explained   choices provided

## 2025-07-14 NOTE — PLAN OF CARE
"Goal Outcome Evaluation:      Plan of Care Reviewed With: patient, spouse    Overall Patient Progress: no changeOverall Patient Progress: no change    Outcome Evaluation: VSS on room air. Emesis x3. Provider notified and orders for NPO except ice chips and meds. Tylenol given x1 for pain, emesis after Tylenol. Voiding adequately. Refused Gabapenin and bowel meds. CMS intact. Abdomen distended. Paracentesis incision site bandaid clean dry and intact. PRN Zofran and Compazine given for nausea. CIWA scale, Valium given x1. Compazine and Valium effective for nausea.           Problem: Adult Inpatient Plan of Care  Goal: Plan of Care Review  Description: The Plan of Care Review/Shift note should be completed every shift.  The Outcome Evaluation is a brief statement about your assessment that the patient is improving, declining, or no change.  This information will be displayed automatically on your shift  note.  Outcome: Not Progressing  Flowsheets (Taken 7/13/2025 7426)  Outcome Evaluation: VSS on room air. Emesis x3. Provider notified and orders for NPO except ice chips and meds. Tylenol given x1 for pain, emesis after Tylenol. Voiding adequately. Refused Gabapenin and bowel meds. CMS intact. Abdomen distended. Paracentesis incision site bandaid clean dry and intact.  Plan of Care Reviewed With:   patient   spouse  Overall Patient Progress: no change  Goal: Patient-Specific Goal (Individualized)  Description: You can add care plan individualizations to a care plan. Examples of Individualization might be:  \"Parent requests to be called daily at 9am for status\", \"I have a hard time hearing out of my right ear\", or \"Do not touch me to wake me up as it startles  me\".  Outcome: Not Progressing  Goal: Absence of Hospital-Acquired Illness or Injury  Outcome: Not Progressing  Intervention: Identify and Manage Fall Risk  Recent Flowsheet Documentation  Taken 7/13/2025 1600 by Tania Feldman RN  Safety Promotion/Fall " Prevention:   activity supervised   nonskid shoes/slippers when out of bed   safety round/check completed   clutter free environment maintained  Intervention: Prevent Skin Injury  Recent Flowsheet Documentation  Taken 7/13/2025 2114 by Tania Feldman RN  Body Position: supine, head elevated  Taken 7/13/2025 1600 by Tania Feldman RN  Body Position: supine, head elevated  Skin Protection: adhesive use limited  Intervention: Prevent and Manage VTE (Venous Thromboembolism) Risk  Recent Flowsheet Documentation  Taken 7/13/2025 1600 by Tania Feldman RN  VTE Prevention/Management: SCDs on (sequential compression devices)  Intervention: Prevent Infection  Recent Flowsheet Documentation  Taken 7/13/2025 1600 by Tania Feldman RN  Infection Prevention:   hand hygiene promoted   rest/sleep promoted  Goal: Optimal Comfort and Wellbeing  Outcome: Not Progressing  Intervention: Monitor Pain and Promote Comfort  Recent Flowsheet Documentation  Taken 7/13/2025 2200 by Tania Feldman RN  Pain Management Interventions: declines  Taken 7/13/2025 2114 by Tania Feldman RN  Pain Management Interventions: medication (see MAR)  Taken 7/13/2025 1600 by Tania Feldman RN  Pain Management Interventions: declines  Intervention: Provide Person-Centered Care  Recent Flowsheet Documentation  Taken 7/13/2025 1600 by Tania Feldman RN  Trust Relationship/Rapport:   care explained   choices provided  Goal: Readiness for Transition of Care  Outcome: Not Progressing     Problem: Pain Acute  Goal: Optimal Pain Control and Function  Outcome: Not Progressing  Intervention: Develop Pain Management Plan  Recent Flowsheet Documentation  Taken 7/13/2025 2200 by Tania Feldman RN  Pain Management Interventions: declines  Taken 7/13/2025 2114 by Tania Feldman RN  Pain Management Interventions: medication (see MAR)  Taken 7/13/2025 1600 by Tania Feldman RN  Pain Management Interventions: declines  Intervention: Prevent or Manage Pain  Recent Flowsheet  Documentation  Taken 7/13/2025 1600 by Tania Feldman RN  Bowel Elimination Promotion: adequate fluid intake promoted  Medication Review/Management: medications reviewed     Problem: Electrolyte Imbalance  Goal: Electrolyte Balance  Outcome: Not Progressing     Problem: Excessive Substance Use  Goal: Optimized Energy Level (Excessive Substance Use)  Outcome: Not Progressing  Goal: Improved Behavioral Control (Excessive Substance Use)  Outcome: Not Progressing  Goal: Increased Participation and Engagement (Excessive Substance Use)  Outcome: Not Progressing  Goal: Improved Physiologic Symptoms (Excessive Substance Use)  Outcome: Not Progressing  Goal: Enhanced Social, Occupational or Functional Skills (Excessive Substance Use)  Outcome: Not Progressing  Intervention: Promote Social, Occupational and Functional Ability  Recent Flowsheet Documentation  Taken 7/13/2025 1600 by Tania Feldman RN  Trust Relationship/Rapport:   care explained   choices provided

## 2025-07-14 NOTE — PLAN OF CARE
"Pt is alert and oriented x4, was not oob, at first stated he wanted to try after Oxycodone, but later refused, will try after next dose of Oxycodone. Pain to knee, bilat LE, lower extremities weakness. Pt is voiding via urinal, CIWA 0. SL. VSS on RA. Pt reported productive cough, with clear sputum. Lungs clear, bowel sounds positive. Tolerated full liquid diet. Discharge plan TBD.         Goal Outcome Evaluation:      Plan of Care Reviewed With: patient    Overall Patient Progress: no changeOverall Patient Progress: no change    Outcome Evaluation: CIWA 0      Problem: Adult Inpatient Plan of Care  Goal: Plan of Care Review  Description: The Plan of Care Review/Shift note should be completed every shift.  The Outcome Evaluation is a brief statement about your assessment that the patient is improving, declining, or no change.  This information will be displayed automatically on your shift  note.  7/14/2025 1834 by Melinda Turcios RN  Outcome: Not Progressing  Flowsheets (Taken 7/14/2025 1834)  Outcome Evaluation: CIWA 0  Plan of Care Reviewed With: patient  Overall Patient Progress: no change  7/14/2025 1825 by Melinda Turcios RN  Flowsheets (Taken 7/14/2025 1825)  Plan of Care Reviewed With: patient  Overall Patient Progress: no change  7/14/2025 1823 by Melinda Turcios RN  Outcome: Progressing  Flowsheets (Taken 7/14/2025 1823)  Outcome Evaluation: CIWA 0  Goal: Patient-Specific Goal (Individualized)  Description: You can add care plan individualizations to a care plan. Examples of Individualization might be:  \"Parent requests to be called daily at 9am for status\", \"I have a hard time hearing out of my right ear\", or \"Do not touch me to wake me up as it startles  me\".  7/14/2025 1834 by Melidna Turcios RN  Outcome: Not Progressing  7/14/2025 1823 by Melinda Turcios RN  Outcome: Progressing  Goal: Absence of Hospital-Acquired Illness or Injury  7/14/2025 1834 by Melinda Turcios, RN  Outcome: Not Progressing  7/14/2025 1823 by " Gibert, Melinda, RN  Outcome: Progressing  Intervention: Identify and Manage Fall Risk  Recent Flowsheet Documentation  Taken 7/14/2025 1811 by Melinda Turcios RN  Safety Promotion/Fall Prevention:   activity supervised   safety round/check completed  Intervention: Prevent Skin Injury  Recent Flowsheet Documentation  Taken 7/14/2025 1811 by Melinda Turcios RN  Body Position: supine, head elevated  Intervention: Prevent Infection  Recent Flowsheet Documentation  Taken 7/14/2025 1815 by Melinda Turcios RN  Infection Prevention: hand hygiene promoted  Goal: Optimal Comfort and Wellbeing  7/14/2025 1834 by Melinda Turcios RN  Outcome: Not Progressing  7/14/2025 1823 by Melinda Turcios RN  Outcome: Progressing  Intervention: Provide Person-Centered Care  Recent Flowsheet Documentation  Taken 7/14/2025 1811 by Melinda Turcios RN  Trust Relationship/Rapport:   care explained   choices provided  Goal: Readiness for Transition of Care  7/14/2025 1834 by Melinda Turcios RN  Outcome: Not Progressing  7/14/2025 1823 by Melinda Turcios RN  Outcome: Progressing     Problem: Pain Acute  Goal: Optimal Pain Control and Function  7/14/2025 1834 by Melinda Turcios RN  Outcome: Not Progressing  7/14/2025 1823 by Melinda Turcios RN  Outcome: Progressing     Problem: Electrolyte Imbalance  Goal: Electrolyte Balance  7/14/2025 1834 by Melinda Turcios RN  Outcome: Not Progressing  7/14/2025 1823 by Melinda Turcios RN  Outcome: Progressing     Problem: Excessive Substance Use  Goal: Optimized Energy Level (Excessive Substance Use)  7/14/2025 1834 by Melinda Turcios RN  Outcome: Not Progressing  7/14/2025 1823 by Melinda Turcios RN  Outcome: Progressing  Goal: Improved Behavioral Control (Excessive Substance Use)  7/14/2025 1834 by Melinda Turcios RN  Outcome: Not Progressing  7/14/2025 1823 by Melinda Turcios RN  Outcome: Progressing  Goal: Increased Participation and Engagement (Excessive Substance Use)  7/14/2025 1834 by Melinda Turcios RN  Outcome: Not  Progressing  7/14/2025 1823 by Melinda Turcios RN  Outcome: Progressing  Goal: Improved Physiologic Symptoms (Excessive Substance Use)  7/14/2025 1834 by Melinda Turcios RN  Outcome: Not Progressing  7/14/2025 1823 by Melinda Turcios RN  Outcome: Progressing  Goal: Enhanced Social, Occupational or Functional Skills (Excessive Substance Use)  7/14/2025 1834 by Melinda Turcios RN  Outcome: Not Progressing  7/14/2025 1823 by Melinda Turcios RN  Outcome: Progressing  Intervention: Promote Social, Occupational and Functional Ability  Recent Flowsheet Documentation  Taken 7/14/2025 1811 by Melinda Turcios RN  Trust Relationship/Rapport:   care explained   choices provided

## 2025-07-14 NOTE — PROGRESS NOTES
GASTROENTEROLOGY PROGRESS NOTE    CC: RUQ abdominal pain    SUBJECTIVE:  No pain, nausea                           Tolerating diet     OBJECTIVE:  General Appearance:  Getting ready to go for a walk with PT  /83 (BP Location: Left arm)   Pulse 107   Temp 97.8  F (36.6  C) (Temporal)   Resp 19   Ht 1.829 m (6')   Wt 115.7 kg (255 lb 1.2 oz)   SpO2 93%   BMI 34.59 kg/m      No data found.    PHYSICAL EXAM    EYES: scleral icterus   RESPIRATORY: CTA  CARDIOVASCULAR: CTA  GASTROINTESTINAL: Distended, slightly firm, + BS  SKIN/HAIR/NAILS: jaundiced   NEUROLOGIC: grossly intact, no signs of HE      Additional Comments:  ROS, FH, SH: See initial GI consult for details.    I have reviewed the patient's new clinical lab results:    Recent Labs   Lab Test 07/14/25  0700 07/13/25  0740 07/12/25  0625   WBC 6.4 5.7 4.6   HGB 11.7* 13.0* 12.1*   * 101* 101*   PLT 90* 71* 58*   INR 1.95* 1.76* 1.69*     Recent Labs   Lab Test 07/14/25  0700 07/13/25  0740 07/12/25  0625   POTASSIUM 3.5 3.8 3.8   CHLORIDE 95* 91* 95*   CO2 25 25 24   BUN 14.1 14.9 14.2   ANIONGAP 14 17* 14     Recent Labs   Lab Test 07/14/25  0700 07/13/25  0740 07/12/25  0625 07/11/25  0549 07/10/25  1345 07/09/25  0811 07/08/25  1611 07/08/25  1512   ALBUMIN 3.3* 3.6 3.5   < >  --    < >  --  4.4   BILITOTAL 11.7* 11.4* 11.3*   < >  --    < >  --  5.9*   ALT 41 39 32   < >  --    < >  --  64   * 114* 104*   < >  --    < >  --  231*   PROTEIN  --   --   --   --  30*  --  50*  --    LIPASE  --   --   --   --   --   --   --  38    < > = values in this interval not displayed.     Paracentesis 7/10/2025:   Latest Reference Range & Units 07/10/25 12:14   Cell Count Fluid Source  Paracentesis   Total Nucleated Cells /uL 91,980   Absolute Neutrophils, Body Fluid /uL 89,220.6 ()   % Neutrophils Fluid % 97   % Lymphocytes Fluid % 0   % Mono/Macro Fluid % 3   Color Fluid Colorless, Yellow  Orange !   Appearance Fluid Clear  Cloudy !    Albumin Fluid Source  Paracentesis   Albumin Fluid g/dL 2.4     MELD 3.0: 24 at 7/14/2025  7:00 AM  MELD-Na: 25 at 7/14/2025  7:00 AM  Calculated from:  Serum Creatinine: 0.63 mg/dL (Using min of 1 mg/dL) at 7/14/2025  7:00 AM  Serum Sodium: 134 mmol/L at 7/14/2025  7:00 AM  Total Bilirubin: 11.7 mg/dL at 7/14/2025  7:00 AM  Serum Albumin: 3.3 g/dL at 7/14/2025  7:00 AM  INR(ratio): 1.95 at 7/14/2025  7:00 AM  Age at listing (hypothetical): 35 years  Sex: Male at 7/14/2025  7:00 AM        IMAGING     EXAM: CT ABDOMEN PELVIS W CONTRAST  LOCATION: [facility]  DATE: 7/8/2025    IMPRESSION:   1.  Cirrhotic morphology of the liver with evidence of portal hypertension and trace abdominopelvic free fluid. No definite drainable ascites at this time.  2.  Superimposed hepatic steatosis is nonspecific but given heterogeneity and subtle surrounding fat stranding, superimposed acute hepatitis is also possible.  3.  Likely cholelithiasis and/or sludge with some subtle fat stranding adjacent to gallbladder, could be related to #'s 1 and 2, but consider further evaluation with right upper quadrant ultrasound if there is clinical concern for acute cholecystitis. No   evidence of biliary obstruction.    EXAM: US ABDOMEN LIMITED  LOCATION: [facility]  DATE: 7/8/2025    IMPRESSION:  1.  Few small stones versus sludge in the gallbladder. No wall thickening or free fluid to suggest acute cholecystitis.     2.  Hepatic steatosis.    EXAM: US ABDOMEN LIMITED W ABDOMEN DOPPLER LIMITED  LOCATION: [facility]  DATE: 7/10/2025    IMPRESSION:  1.  Technically challenging and limited exam.  2.  Hepatic steatosis.  3.  Nondiagnostic evaluation of the hepatic vasculature. The portal and hepatic veins were patent on 7/8/2025 CT. They also appeared patent on yesterday's MRI. If there is continued concern for portal and/or hepatic vein pathology, multiphasic CT would   be  recommended.  No significant ascites, although a trace amount was present on yesterday's MRI.     HIDA 7/9:  FINDINGS: Normal radionuclide activity in liver, gallbladder, bile ducts, and small bowel. No evidence of cystic or common duct obstruction or intrinsic liver disease.                                                                  IMPRESSION:   Negative for acute cholecystitis.     MRCP 7/9:  1.  Cirrhosis with small volume ascites.  2.  No biliary dilatation or choledocholithiasis.  3.  Cholelithiasis.    ENDOSCOPIC EVALUATION     No prior endoscopic procedures      ASSESSMENT AND PLAN     Assessment:  35 year old male without significant PMH other than moderate alcohol use,  that presented on 7/8 with symptoms of RUQ abdominal pain.    Presenting labs showed AST of 231, ALT of 64, alk phos of 286, total bili of 5.9, normal lipase, and platelet level of 68.  Paracentesis     Paracentesis with absolute neutrophil count of > 89,000.    Liver cirrhosis noted on imaging.  Hepatic panel AST> ALT, alcoholic pattern/alcoholic hepatitis. MRCP negative for biliary etiology of elevated LFTs.      Hepatitis B/C negative, iron studies negative for iron overload.       Plan:  -continue IV ceftriaxone, most recent US did not indicate large area of ascites that could be tapped (if he is responsive to antibiotics, give 5 day course, then transition to Cipro 500 mg indefinitely for prophylaxis     -diuresis per renal, appreciate    -continue 2 g NA diet,     -unable to give steroids for alcoholic hepatitis 2/2 SBP    -watch for peritoneal fluid cultures     -trend MELD    -agree with thiamine, folic acid    -alcohol cessation     -will need follow up in hepatology with [facility]     22 minutes of total time was spent providing patient care, including patient evaluation, reviewing documentation/test results, and .     I will update Dr. Redding, GI staff  Laura Chan NP   [facility]  [phone]

## 2025-07-14 NOTE — PROGRESS NOTES
07/14/25 1045   Appointment Info   Signing Clinician's Name / Credentials (PT) Vikki Ivory DPT   Living Environment   People in Home spouse   Current Living Arrangements apartment   Home Accessibility stairs to enter home   Number of Stairs, Main Entrance greater than 10 stairs  (2nd floor apartment, no elevator)   Stair Railings, Main Entrance railings on both sides of stairs  (but too wide to provide significant support B)   Transportation Anticipated car, drives self   Living Environment Comments Typically IND with all mobility, ADLs, IADLs at baseline; laundry on 1st floor of apartment; works as  for [employer] (in and out of cars all day)   Self-Care   Usual Activity Tolerance excellent   Current Activity Tolerance poor   Equipment Currently Used at Home none   Fall history within last six months no   General Information   Onset of Illness/Injury or Date of Surgery 07/08/25   Referring Physician Irvin Andrea MD   Patient/Family Therapy Goals Statement (PT) to have less pain   Pertinent History of Current Problem (include personal factors and/or comorbidities that impact the POC) 35 year old male admitted on 7/8/2025. He has little past medical history who presents to the ED for evaluation of abdominal pain.   Existing Precautions/Restrictions fall   General Observations supine, NAD   Cognition   Affect/Mental Status (Cognition) flat/blunted affect   Orientation Status (Cognition) oriented x 4   Follows Commands (Cognition) WFL   Pain Assessment   Patient Currently in Pain Yes, see Vital Sign flowsheet  (2 at rest, 10 with any attempts to move; generally L knee but now occasionally B knees)   Integumentary/Edema   Integumentary/Edema Comments no significant swelling appreciated visually but pt reports feeling L knee is very swollen   Posture    Posture Forward head position;Protracted shoulders   Range of Motion (ROM)   Range of Motion ROM deficits secondary to pain;ROM deficits  secondary to weakness   ROM Comment B knee ROM is limited to ~40degs actively, once sitting EOB, pt is able to get R knee to 90deg; shoulder ROM also very limited, actively able to get shoulder flexion and abduction only to ~90degs actively   Strength (Manual Muscle Testing)   Strength (Manual Muscle Testing) Deficits observed during functional mobility   Strength Comments significant weakness noted in BUE and BLE   Bed Mobility   Comment, (Bed Mobility) max Ax2 for supine <> sit   Transfers   Comment, (Transfers) max Ax2 with 2WW, able to unweight buttocks but unable to fully stand from elevated bed height   Gait/Stairs (Locomotion)   Muse Level (Gait) unable to assess   Balance   Balance Comments Midline sitting balance iwhtout UE support   Sensory Examination   Sensory Perception patient reports no sensory changes   Clinical Impression   Criteria for Skilled Therapeutic Intervention Yes, treatment indicated   PT Diagnosis (PT) decreased functional mobility   Influenced by the following impairments pain, weakness, decreased ROM, impaired gait, impaired balance   Functional limitations due to impairments bed mobility, transfers, ambulation, stair climbing   Clinical Presentation (PT Evaluation Complexity) stable   Clinical Presentation Rationale clinical judgement   Clinical Decision Making (Complexity) low complexity   Planned Therapy Interventions (PT) balance training;bed mobility training;gait training;home exercise program;neuromuscular re-education;manual therapy techniques;patient/family education;postural re-education;ROM (range of motion);stair training;strengthening;transfer training;progressive activity/exercise;risk factor education;home program guidelines   Risk & Benefits of therapy have been explained evaluation/treatment results reviewed;care plan/treatment goals reviewed;risks/benefits reviewed;current/potential barriers reviewed;participants voiced agreement with care plan;participants  included;patient;spouse/significant other   PT Total Evaluation Time   PT Eval, Low Complexity Minutes (20795) 9   Physical Therapy Goals   PT Frequency Daily   PT Predicted Duration/Target Date for Goal Attainment 07/21/25   PT Goals Bed Mobility;Transfers;Gait;Stairs   PT: Bed Mobility Supervision/stand-by assist;Supine to/from sit   PT: Transfers Supervision/stand-by assist;Sit to/from stand;Bed to/from chair;Assistive device   PT: Gait Supervision/stand-by assist;Assistive device;100 feet   PT: Stairs Supervision/stand-by assist;Assistive device;Greater than 10 stairs   Interventions   Interventions Quick Adds Therapeutic Activity;Therapeutic Procedure   Therapeutic Procedure/Exercise   Ther. Procedure: strength, endurance, ROM, flexibillity Minutes (74246) 15   Symptoms Noted During/After Treatment fatigue;increased pain   Treatment Detail/Skilled Intervention Pt cued and assistd to perform x10 reps B: APs (actively and PT provided PROM stretch to pt's tolerance especially on L ankle as minimal ROM observed), QS, heel slides (AAROM and only tolerated ~30-40degs knee flexion B), and hip ab/duction; rest breaks throughout d/t pain and fatigue; once sitting EOB, noted pt holding a lot of tension in upper body, educ on working on shoulder rolls, active arm ROM throughout the day in whatever position he is in; educ spouse on how she can help throughout the day as well   Therapeutic Activity   Therapeutic Activities: dynamic activities to improve functional performance Minutes (00177) 35   Symptoms Noted During/After Treatment Fatigue;Increased pain   Treatment Detail/Skilled Intervention Greeted supine, spouse present throughout and encouraging to pt; initially pt stating he will only do in bed exercises for PT session, PT started there after subjective portion (see TE section); then PT discussed with pt that we need to try more mobility as he has not been out of bed for days; pt hesistantly agreeable but declines  "to use SS again; discussed with pt that given limited knee flexion tolerance, PT agrees that SS likely not best option, educ on using 2WW to use UE support for upright positioning and this would alos let pt keep LLE as straight as he prefers; pt requiring max cues and max Ax2 for sequencing for supine > sit; once sitting EOB, pt also reporting feeling as though his BUEs are \"very weak\"; pt only able to demo shoulder ROM in flexion and abduction actively to ~90degs each; from elevated bed height and with max Ax2 with cues for sequencing, attempted x3 for sit > stand, each stand, pt able to unweight buttocks but unable to fully stand; fatigued and too painful to try further attempts; pt requiring max Ax2 for sit > supine and total A via lift sheet to boost up in bed; bed alarm on and needs within reach at PT exit; PT paged MD notifying of pt's difficulty with any mobility and asked if ortho and/or pain consult may be beneficial   PT Discharge Planning   PT Plan progress ROM, strength, bed mobility, sit <> stand with 2WW (consider trial of KI? lift pants?)   PT Discharge Recommendation (DC Rec) Transitional Care Facility   PT Rationale for DC Rec Currently pt is significantly below baseline for mobility and is requiring max Ax2 for bed mobility and unable to stand with 2WW.  Pt would not be able to navigate home environment at this time.  Rec TCU to maximize strength, balance, and mobility.  If pt has improvement in pain and/or mobility, could likely progress to going home with spouse assist as pt is young and was very IND prior to this admission.   PT Brief overview of current status lift, would NOT rec SS   PT Total Distance Amb During Session (feet) 0   Physical Therapy Time and Intention   Timed Code Treatment Minutes 50   Total Session Time (sum of timed and untimed services) 59     "

## 2025-07-14 NOTE — PROGRESS NOTES
[facility]    Medicine Progress Note - Hospitalist Service    Date of Admission:  7/8/2025    Assessment & Plan      Ricci Brown is a 35 year old male admitted on 7/8/2025. He has little past medical history who presents to the ED for evaluation of abdominal pain.   Terence stated that unfortunately he has been regular EtOH drinker at least for the last 10 to 15 years.      Problem list:    #Nausea, vomiting  #Abdominal distention  #Suspected ileus versus beginning constipation.  Versus possible small bowel obstruction    -Fortunately he starts passing flatus earlier.  Feeling more hungry.  Trial of clear liquids for now.  May advance to full liquids later today if continues to tolerate it and with continuous passing of flatus.  - Discussed with him importance of ambulation and physical activity with underlying ileus  - Trial of Dulcolax suppository if able  - As needed antiemetics, PPI on board      #Bilateral knee pain  - Serum uric acid normal  - No ballotable swelling, not erythema on bilateral knees, not warm to touch  - Reassuring bilateral knee x-ray  - He mentioned that he had some issues with bilateral knee pain in the past requiring for him to take daily NSAIDs.  Discussed with him that NSAIDs is not the best regimen for him as of now in the setting of underlying liver cirrhosis, recent ISABELLE, I disposition for bleeding tendencies.  - May utilize as needed APAP  - May utilize as needed oxycodone.  - Examination of other joints did not show any swelling, limitation range of motion.  No prior history of gouty arthritis.  - Avoiding corticosteroids if possible given underlying sepsis and ongoing infection and peritonitis  - PT and OT input    #Sepsis with tachycardia, fever spikes, lactic acidosis secondary to spontaneous bacterial peritonitis    - Tolerated diagnostic and therapeutic paracentesis last 7/10/2025  - Drained 660 mL of cloudy ascitic fluid  - Differential study  showing significant neutrophil count  - Elevated procalcitonin  -Has a pending repeat procalcitonin just for monitoring purposes.  Anticipating improvement given concurrent antibiotics and improving clinical appearance  -GI requesting for repeat paracentesis  -Remain on IV antibiotics currently on ceftriaxone      #Acute kidney injury secondary to underlying IV contrast nephropathy  #Persistent hyperkalemia  #High suspicion for contrast IV nephropathy  #Doubtful for hepatorenal syndrome    - Highly appreciate input from nephrology service  -Hyperkalemia resolved  -Albumin level normal.  No hypotension.  -May do trial of diuresis the next couple of days if continues to demonstrate stable kidney function and electrolytes.  I believe patient recovered from recent ISABELLE with underlying contrast nephropathy.  -  -Avoid NSAIDs  - Avoid nephrotoxic agents  - Will await further instructions and recommendations from nephrology service      #Abdominal pain, jaundice  #Newly diagnosed possible liver cirrhosis  #Suspected cholecystitis, no clear evidence of choledocholithiasis  #Possible alcoholic gastritis  #Alcohol abuse, alcohol dependence  .      Alcohol use  High risk for alcohol withdrawals  Reports drinking 3-4 hard liquor drinks each day. Denies ever going through withdrawal. Does have some tachycardia, may be related to above issues but will monitor on CIWA with symptom triggered benzos for now.   - CIWA monitoring with symptom triggered benzodiazepines  - Gabapentin taper  - Thiamine and folate replacement  Extensive discussion ensued regarding the need and importance of complete EtOH cessation in the setting of this findings and diagnosis of liver cirrhosis, hepatitis, jaundice and now with concomitant kidney injury          Diet: Clear Liquid Diet    DVT Prophylaxis: Pneumatic Compression Devices and Ambulate every shift  Starr Catheter: Not present  Lines: None     Cardiac Monitoring: ACTIVE order. Indication:  Tachyarrhythmias, acute (48 hours)  Code Status: Full Code      Clinically Significant Risk Factors         # Hyponatremia: Lowest Na = 133 mmol/L in last 2 days, will monitor as appropriate  # Hypochloremia: Lowest Cl = 91 mmol/L in last 2 days, will monitor as appropriate      # Hypoalbuminemia: Lowest albumin = 3.1 g/dL at 7/11/2025  5:49 AM, will monitor as appropriate  # Coagulation Defect: INR = 1.95 (Ref range: 0.85 - 1.15) and/or PTT = N/A, will monitor for bleeding  # Thrombocytopenia: Lowest platelets = 71 in last 2 days, will monitor for bleeding                # Obesity: Estimated body mass index is 34.59 kg/m  as calculated from the following:    Height as of this encounter: 1.829 m (6').    Weight as of this encounter: 115.7 kg (255 lb 1.2 oz).             Social Drivers of Health     Received from [facility]          Disposition Plan     Medically Ready for Discharge: Anticipated in 2-4 Days            Irvin Andrea MD, MD  Hospitalist Service  [facility]  Securely message with [employer] (more info)  Text page via [employer] Paging/Directory   ______________________________________________________________________    Interval History   Continuing medicine service care.    Seen and examined.  Case discussed with nursing service.   Family updated this patient's wife present at bedside during my encounter.  Overnight he demonstrated some several issues such as multiple bouts of nausea and vomiting.  He is feeling bloated.  He still complaining of bilateral knee pain and difficulty in bearing weight on it and ambulation.  Fortunately he remained afebrile.  No reported bleeding tendencies.  Stable hemodynamics.  She is not requiring any oxygen support.  No reported alteration mental status well.  He thinks he is getting more hungry and demonstrated several events of passing of flatus earlier.  He is belching more.  No further  vomiting spells earlier today.  Still voiding freely.          Physical Exam   Vital Signs: Temp: 97.8  F (36.6  C) Temp src: Temporal BP: 122/83 Pulse: 107   Resp: 19 SpO2: 93 % O2 Device: None (Room air)    Weight: 255 lbs 1.16 oz    HEENT; Atraumatic, normocephalic, pinkish conjuctiva, pupils bilateral reactive   Icteric sclerae  Skin: warm and moist, no rashes  Jaundice  Lymphatics: no cervical or axillary lymphandenopathy  Lungs: equal chest expansion, clear to auscultation, no wheezes, no stridor, no crackles,   Heart: Improving tachycardic rate, normal rhythm, no rubs or gallops.   Abdomen: Positive bowel sounds, no ongoing tenderness upon palpation, distended, nontender upon palpation, tympanic  Extremities: no deformities,  bilateral lower extremity nonpitting edema   Neuro; follow commands, alert and oriented x3, spontaneous speech, coherent, moves all extremities spontaneously  Psych; no hallucination, euthymic mood, not agitated      Medical Decision Making       50 MINUTES SPENT BY ME on the date of service doing chart review, history, exam, documentation & further activities per the note.  MANAGEMENT DISCUSSED with the following over the past 24 hours: Yes yes   NOTE(S)/MEDICAL RECORDS REVIEWED over the past 24 hours: Yes      Data     I have personally reviewed the following data over the past 24 hrs:    6.4  \   11.7 (L)   / 90 (L)     134 (L) 95 (L) 14.1 /  132 (H)   3.5 25 0.63 (L) \     ALT: 41 AST: 105 (H) AP: 141 TBILI: 11.7 (H)   ALB: 3.3 (L) TOT PROTEIN: 7.3 LIPASE: N/A     INR:  1.95 (H) PTT:  N/A   D-dimer:  N/A Fibrinogen:  N/A       Imaging results reviewed over the past 24 hrs:   Recent Results (from the past 24 hours)   XR Abdomen Port 1 View    Narrative    EXAM: XR ABDOMEN PORT 1 VIEW  LOCATION: [facility]  DATE: 7/13/2025    INDICATION: Vomiting, peritonitis, cirrhosis  COMPARISON: 07/08/2025      Impression    IMPRESSION: Mild gastric distention. Dilated loops  of small bowel. Small bowel obstruction not excluded radiographically. Paucity of colonic gas.

## 2025-07-15 ENCOUNTER — APPOINTMENT (OUTPATIENT)
Dept: ULTRASOUND IMAGING | Facility: CLINIC | Age: 35
End: 2025-07-15
Attending: INTERNAL MEDICINE
Payer: COMMERCIAL

## 2025-07-15 ENCOUNTER — APPOINTMENT (OUTPATIENT)
Dept: PHYSICAL THERAPY | Facility: CLINIC | Age: 35
End: 2025-07-15
Payer: COMMERCIAL

## 2025-07-15 LAB
ALBUMIN SERPL BCG-MCNC: 3.4 G/DL (ref 3.5–5.2)
ALBUMIN UR-MCNC: 20 MG/DL
ALP SERPL-CCNC: 156 U/L (ref 40–150)
ALT SERPL W P-5'-P-CCNC: 47 U/L (ref 0–70)
ANION GAP SERPL CALCULATED.3IONS-SCNC: 17 MMOL/L (ref 7–15)
APPEARANCE UR: ABNORMAL
AST SERPL W P-5'-P-CCNC: 108 U/L (ref 0–45)
BACTERIA #/AREA URNS HPF: ABNORMAL /HPF
BACTERIA FLD CULT: NO GROWTH
BACTERIA SPEC CULT: NO GROWTH
BACTERIA SPEC CULT: NO GROWTH
BASOPHILS # BLD AUTO: 0.1 10E3/UL (ref 0–0.2)
BASOPHILS NFR BLD AUTO: 1 %
BILIRUB SERPL-MCNC: 12.5 MG/DL
BILIRUB UR QL STRIP: ABNORMAL
BUN SERPL-MCNC: 14.2 MG/DL (ref 6–20)
CALCIUM SERPL-MCNC: 9.1 MG/DL (ref 8.8–10.4)
CHLORIDE SERPL-SCNC: 90 MMOL/L (ref 98–107)
COLOR UR AUTO: ABNORMAL
CREAT SERPL-MCNC: 0.73 MG/DL (ref 0.67–1.17)
EGFRCR SERPLBLD CKD-EPI 2021: >90 ML/MIN/1.73M2
EOSINOPHIL # BLD AUTO: 0.1 10E3/UL (ref 0–0.7)
EOSINOPHIL NFR BLD AUTO: 1 %
ERYTHROCYTE [DISTWIDTH] IN BLOOD BY AUTOMATED COUNT: 13.8 % (ref 10–15)
GLUCOSE SERPL-MCNC: 112 MG/DL (ref 70–99)
GLUCOSE UR STRIP-MCNC: NEGATIVE MG/DL
GRAM STAIN RESULT: NORMAL
GRAM STAIN RESULT: NORMAL
HCO3 SERPL-SCNC: 23 MMOL/L (ref 22–29)
HCT VFR BLD AUTO: 34.9 % (ref 40–53)
HGB BLD-MCNC: 12.2 G/DL (ref 13.3–17.7)
HGB UR QL STRIP: NEGATIVE
IMM GRANULOCYTES # BLD: 0.3 10E3/UL
IMM GRANULOCYTES NFR BLD: 3 %
KETONES UR STRIP-MCNC: 20 MG/DL
LEUKOCYTE ESTERASE UR QL STRIP: NEGATIVE
LYMPHOCYTES # BLD AUTO: 0.8 10E3/UL (ref 0.8–5.3)
LYMPHOCYTES NFR BLD AUTO: 7 %
MCH RBC QN AUTO: 35.1 PG (ref 26.5–33)
MCHC RBC AUTO-ENTMCNC: 35 G/DL (ref 31.5–36.5)
MCV RBC AUTO: 100 FL (ref 78–100)
MONOCYTES # BLD AUTO: 0.7 10E3/UL (ref 0–1.3)
MONOCYTES NFR BLD AUTO: 6 %
MUCOUS THREADS #/AREA URNS LPF: PRESENT /LPF
NEUTROPHILS # BLD AUTO: 10.1 10E3/UL (ref 1.6–8.3)
NEUTROPHILS NFR BLD AUTO: 84 %
NITRATE UR QL: NEGATIVE
NRBC # BLD AUTO: 0 10E3/UL
NRBC BLD AUTO-RTO: 0 /100
PH UR STRIP: 5.5 [PH] (ref 5–7)
PLATELET # BLD AUTO: 135 10E3/UL (ref 150–450)
POTASSIUM SERPL-SCNC: 3.5 MMOL/L (ref 3.4–5.3)
PROT SERPL-MCNC: 7.6 G/DL (ref 6.4–8.3)
RBC # BLD AUTO: 3.48 10E6/UL (ref 4.4–5.9)
RBC URINE: 2 /HPF
SODIUM SERPL-SCNC: 130 MMOL/L (ref 135–145)
SP GR UR STRIP: 1.03 (ref 1–1.03)
SQUAMOUS EPITHELIAL: <1 /HPF
UROBILINOGEN UR STRIP-MCNC: 3 MG/DL
WBC # BLD AUTO: 12.1 10E3/UL (ref 4–11)
WBC CAST: 7 /LPF (ref ?–1)
WBC URINE: 6 /HPF

## 2025-07-15 PROCEDURE — 49083 ABD PARACENTESIS W/IMAGING: CPT | Mod: 52

## 2025-07-15 PROCEDURE — 250N000011 HC RX IP 250 OP 636: Performed by: INTERNAL MEDICINE

## 2025-07-15 PROCEDURE — 80053 COMPREHEN METABOLIC PANEL: CPT | Performed by: INTERNAL MEDICINE

## 2025-07-15 PROCEDURE — 97530 THERAPEUTIC ACTIVITIES: CPT | Mod: GP | Performed by: PHYSICAL THERAPIST

## 2025-07-15 PROCEDURE — 36415 COLL VENOUS BLD VENIPUNCTURE: CPT | Performed by: INTERNAL MEDICINE

## 2025-07-15 PROCEDURE — 250N000013 HC RX MED GY IP 250 OP 250 PS 637: Performed by: STUDENT IN AN ORGANIZED HEALTH CARE EDUCATION/TRAINING PROGRAM

## 2025-07-15 PROCEDURE — 81001 URINALYSIS AUTO W/SCOPE: CPT | Performed by: INTERNAL MEDICINE

## 2025-07-15 PROCEDURE — 120N000001 HC R&B MED SURG/OB

## 2025-07-15 PROCEDURE — 250N000013 HC RX MED GY IP 250 OP 250 PS 637: Performed by: INTERNAL MEDICINE

## 2025-07-15 PROCEDURE — 87040 BLOOD CULTURE FOR BACTERIA: CPT | Performed by: INTERNAL MEDICINE

## 2025-07-15 PROCEDURE — 99222 1ST HOSP IP/OBS MODERATE 55: CPT | Performed by: INTERNAL MEDICINE

## 2025-07-15 PROCEDURE — 99233 SBSQ HOSP IP/OBS HIGH 50: CPT | Performed by: INTERNAL MEDICINE

## 2025-07-15 PROCEDURE — 250N000011 HC RX IP 250 OP 636: Performed by: EMERGENCY MEDICINE

## 2025-07-15 PROCEDURE — 85004 AUTOMATED DIFF WBC COUNT: CPT | Performed by: INTERNAL MEDICINE

## 2025-07-15 RX ORDER — THERA TABS 400 MCG
1 TAB ORAL DAILY
Status: DISPENSED | OUTPATIENT
Start: 2025-07-15

## 2025-07-15 RX ORDER — ALBUMIN (HUMAN) 12.5 G/50ML
25-50 SOLUTION INTRAVENOUS ONCE
Status: DISCONTINUED | OUTPATIENT
Start: 2025-07-15 | End: 2025-07-15

## 2025-07-15 RX ADMIN — ACETAMINOPHEN 650 MG: 325 TABLET ORAL at 15:17

## 2025-07-15 RX ADMIN — ACETAMINOPHEN 650 MG: 325 TABLET ORAL at 08:24

## 2025-07-15 RX ADMIN — OXYCODONE HYDROCHLORIDE 5 MG: 5 TABLET ORAL at 10:16

## 2025-07-15 RX ADMIN — DOCUSATE SODIUM 100 MG: 100 CAPSULE, LIQUID FILLED ORAL at 20:56

## 2025-07-15 RX ADMIN — CEFTRIAXONE 2 G: 2 INJECTION, POWDER, FOR SOLUTION INTRAMUSCULAR; INTRAVENOUS at 08:28

## 2025-07-15 RX ADMIN — GABAPENTIN 300 MG: 300 CAPSULE ORAL at 03:38

## 2025-07-15 RX ADMIN — FEXOFENADINE HYDROCHLORIDE 60 MG: 60 TABLET ORAL at 10:16

## 2025-07-15 RX ADMIN — PROCHLORPERAZINE EDISYLATE 10 MG: 5 INJECTION INTRAMUSCULAR; INTRAVENOUS at 08:26

## 2025-07-15 RX ADMIN — MULTIVITAMIN TABLET 1 TABLET: TABLET at 15:18

## 2025-07-15 RX ADMIN — FOLIC ACID 1 MG: 1 TABLET ORAL at 10:15

## 2025-07-15 RX ADMIN — PANTOPRAZOLE SODIUM 40 MG: 40 TABLET, DELAYED RELEASE ORAL at 08:25

## 2025-07-15 RX ADMIN — GABAPENTIN 300 MG: 300 CAPSULE ORAL at 20:56

## 2025-07-15 RX ADMIN — ONDANSETRON 4 MG: 2 INJECTION, SOLUTION INTRAMUSCULAR; INTRAVENOUS at 04:48

## 2025-07-15 RX ADMIN — PANTOPRAZOLE SODIUM 40 MG: 40 TABLET, DELAYED RELEASE ORAL at 16:41

## 2025-07-15 RX ADMIN — OXYCODONE HYDROCHLORIDE 5 MG: 5 TABLET ORAL at 03:37

## 2025-07-15 RX ADMIN — THIAMINE HCL TAB 100 MG 100 MG: 100 TAB at 10:16

## 2025-07-15 RX ADMIN — GABAPENTIN 300 MG: 300 CAPSULE ORAL at 11:49

## 2025-07-15 ASSESSMENT — ACTIVITIES OF DAILY LIVING (ADL)
ADLS_ACUITY_SCORE: 37
ADLS_ACUITY_SCORE: 37
ADLS_ACUITY_SCORE: 40
ADLS_ACUITY_SCORE: 40
ADLS_ACUITY_SCORE: 37
ADLS_ACUITY_SCORE: 40
ADLS_ACUITY_SCORE: 37
ADLS_ACUITY_SCORE: 37
ADLS_ACUITY_SCORE: 40
ADLS_ACUITY_SCORE: 37
ADLS_ACUITY_SCORE: 37
ADLS_ACUITY_SCORE: 40
ADLS_ACUITY_SCORE: 40
ADLS_ACUITY_SCORE: 37
ADLS_ACUITY_SCORE: 37
ADLS_ACUITY_SCORE: 40
ADLS_ACUITY_SCORE: 37
ADLS_ACUITY_SCORE: 40

## 2025-07-15 NOTE — PLAN OF CARE
"Goal Outcome Evaluation:      Plan of Care Reviewed With: patient    Overall Patient Progress: no changeOverall Patient Progress: no change    Outcome Evaluation: Pt is A&Ox4. Tachycardic with exertion. Otherwise vitally stable. LS Clear. No SOB. Clear productive cough. PRN zofran given Nausea. 1 emesis episode. Aromatherapy provided. Declines more intervention. Bowel sounds normoactive. UP to the chair for 1.5 hrs, tolerated ok. Assist x2 w/lift. Voiding with urinal at bedside. LBM: 7/14. IV abx. PIV L forearm SL. Tolerating full liquid diet. Pain controlled with scheduled regimen and PRN oxy. CMS: intact. Tele: Sinus tachy. CIWA: 0. Discharge plans to D      Problem: Adult Inpatient Plan of Care  Goal: Plan of Care Review  Description: The Plan of Care Review/Shift note should be completed every shift.  The Outcome Evaluation is a brief statement about your assessment that the patient is improving, declining, or no change.  This information will be displayed automatically on your shift  note.  Outcome: Progressing  Flowsheets (Taken 7/15/2025 0351)  Outcome Evaluation: Pt is A&Ox4. Tachycardic with exertion. Otherwise vitally stable. LS Clear. No SOB. Clear productive cough. Denies nausea. Bowel sounds normoactive. UP to the chair for 1.5 hrs, tolerated ok. Assist x2 w/lift. Voiding with urinal at bedside. LBM: 7/14. IV abx. PIV L forearm SL. Tolerating full liquid diet. Pain controlled with scheduled regimen and PRN oxy. CMS: intact. Tele: Sinus tachy. CIWA: 0. Discharge plans to D  Plan of Care Reviewed With: patient  Overall Patient Progress: no change  Goal: Patient-Specific Goal (Individualized)  Description: You can add care plan individualizations to a care plan. Examples of Individualization might be:  \"Parent requests to be called daily at 9am for status\", \"I have a hard time hearing out of my right ear\", or \"Do not touch me to wake me up as it startles  me\".  Outcome: Progressing  Goal: Absence of " Hospital-Acquired Illness or Injury  Outcome: Progressing  Intervention: Identify and Manage Fall Risk  Recent Flowsheet Documentation  Taken 7/14/2025 2022 by Xander Munguia RN  Safety Promotion/Fall Prevention:   activity supervised   safety round/check completed  Intervention: Prevent Skin Injury  Recent Flowsheet Documentation  Taken 7/15/2025 0337 by Xander Munguia RN  Body Position:   supine, head elevated   supine, legs elevated  Taken 7/14/2025 2143 by Xander Munguia RN  Body Position:   supine, head elevated   supine, legs elevated  Taken 7/14/2025 2022 by Xander Munguia RN  Body Position:   supine, head elevated   supine, legs elevated  Skin Protection: adhesive use limited  Intervention: Prevent and Manage VTE (Venous Thromboembolism) Risk  Recent Flowsheet Documentation  Taken 7/14/2025 2022 by Xander Munguia RN  VTE Prevention/Management: SCDs on (sequential compression devices)  Intervention: Prevent Infection  Recent Flowsheet Documentation  Taken 7/14/2025 2022 by Xander Munguia RN  Infection Prevention:   rest/sleep promoted   hand hygiene promoted  Goal: Optimal Comfort and Wellbeing  Outcome: Progressing  Intervention: Monitor Pain and Promote Comfort  Recent Flowsheet Documentation  Taken 7/15/2025 0337 by Xander Munguia RN  Pain Management Interventions: medication (see MAR)  Taken 7/14/2025 2111 by Xander Munguia RN  Pain Management Interventions: rest  Taken 7/14/2025 2022 by Xander Munguia RN  Pain Management Interventions: medication (see MAR)  Intervention: Provide Person-Centered Care  Recent Flowsheet Documentation  Taken 7/14/2025 2022 by Xander Munguia RN  Trust Relationship/Rapport: care explained  Goal: Readiness for Transition of Care  Outcome: Progressing     Problem: Pain Acute  Goal: Optimal Pain Control and Function  Outcome: Progressing  Intervention: Optimize Psychosocial Wellbeing  Recent Flowsheet Documentation  Taken  7/14/2025 2022 by Xander Munguia RN  Supportive Measures:   active listening utilized   decision-making supported   goal-setting facilitated   positive reinforcement provided   problem-solving facilitated   relaxation techniques promoted   self-care encouraged   verbalization of feelings encouraged  Intervention: Develop Pain Management Plan  Recent Flowsheet Documentation  Taken 7/15/2025 0337 by Xander Munguia RN  Pain Management Interventions: medication (see MAR)  Taken 7/14/2025 2111 by Xander Munguia RN  Pain Management Interventions: rest  Taken 7/14/2025 2022 by Xander Munguia RN  Pain Management Interventions: medication (see MAR)  Intervention: Prevent or Manage Pain  Recent Flowsheet Documentation  Taken 7/14/2025 2022 by Xander Munguia RN  Sensory Stimulation Regulation: care clustered  Bowel Elimination Promotion: adequate fluid intake promoted  Medication Review/Management: medications reviewed     Problem: Electrolyte Imbalance  Goal: Electrolyte Balance  Outcome: Progressing  Intervention: Monitor and Manage Electrolyte Imbalance  Recent Flowsheet Documentation  Taken 7/14/2025 2022 by Xander Munguia RN  Fluid/Electrolyte Management: fluids provided     Problem: Excessive Substance Use  Goal: Optimized Energy Level (Excessive Substance Use)  Outcome: Progressing  Goal: Improved Behavioral Control (Excessive Substance Use)  Outcome: Progressing  Goal: Increased Participation and Engagement (Excessive Substance Use)  Outcome: Progressing  Intervention: Facilitate Participation and Engagement  Recent Flowsheet Documentation  Taken 7/14/2025 2022 by Xander Munguia RN  Supportive Measures:   active listening utilized   decision-making supported   goal-setting facilitated   positive reinforcement provided   problem-solving facilitated   relaxation techniques promoted   self-care encouraged   verbalization of feelings encouraged  Goal: Improved Physiologic Symptoms  (Excessive Substance Use)  Outcome: Progressing  Goal: Enhanced Social, Occupational or Functional Skills (Excessive Substance Use)  Outcome: Progressing  Intervention: Promote Social, Occupational and Functional Ability  Recent Flowsheet Documentation  Taken 7/14/2025 2022 by Xander Munguia, RN  Trust Relationship/Rapport: care explained

## 2025-07-15 NOTE — CONSULTS
"SPIRITUAL HEALTH SERVICES - Consult Note  Ortho 6    Referral Source/ Reason for Visit: Patient and family consult for emotional support.    Summary and Recommendations -     Listened intently as patient described his present state of powerlessness, \"Not being able to do anything for myself.\"    He said, \"I feel humbled.  My life has been paused.\"  He added that he needs more coping strategies to deal with stress.    Patient feels grateful for the love and prayers from his wife and extended family.    He shared that his dream is to get a more stable job and, \"Get a house with a garden and chickens; and go fishing.\"  He said this goal will provide motivation to get healthy.      Plan: He is currently scheduled to discharge 7/16/25.  He and his wife remain open to future visits for emotional and spiritual support.    [facility] is available upon request.    . MARIIA Pinzon.  Staff     [facility] available 24/7 for emergent requests/ referrals, either by paging the on-call  or by entering an ASAP/STAT consult in [employer], which will also page the on-call .      Assessment    Saw pt Ricci Brown regarding patient and family consult for emotional and spiritual support.    Patient/Family Understanding of Illness and Goals of Care - Patient understands that he remains in the hospital due to liver cirrhosis and other hospital problems.    Distress and Loss - Patient shared, \"I almost never go to the doctor.  If I do, they say, 'This is what you have'... and just give me medicine. I have never been in the hospital this long.  Me being in charge is not working.  I need to trust God.\"     Wife has being sleeping at the hospital every night; leaving only to get food, etc.  She is distressed that she needs to return to work at [employer] Thursday.     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  He describes wide swings in the industry that create " dramatic differences              in his pay month-to -month.  This uncertainty was one of the stressors that led to his increased hard alcohol consumption.    Strengths, Coping and Resources - His wife, her culturally-based timo group and patient's parents are offering prayers and encouragement.    Meaning, Beliefs and Spirituality - Patient continues to describe his timo as Presbyterian, with Episcopal influences.  He has been praying the rosary during this hospitalization.

## 2025-07-15 NOTE — PROGRESS NOTES
GASTROENTEROLOGY PROGRESS NOTE    CC: RUQ abdominal pain     SUBJECTIVE:  Last BM on 7/14.        OBJECTIVE:  General Appearance:  resting in bed, distended jaundice, NAD  BP (!) 160/78 (BP Location: Left arm)   Pulse (!) 127   Temp 98.7  F (37.1  C) (Temporal)   Resp 20   Ht 1.829 m (6')   Wt 115.7 kg (255 lb 1.2 oz)   SpO2 92%   BMI 34.59 kg/m      No data found.    PHYSICAL EXAM  EYES: scleral icterus   RESPIRATORY: CTA  CARDIOVASCULAR: CTA  GASTROINTESTINAL: Distended, slightly firm, + BS  SKIN/HAIR/NAILS: jaundiced   NEUROLOGIC: grossly intact, no signs of HE      Additional Comments:  ROS, FH, SH: See initial GI consult for details.    I have reviewed the patient's new clinical lab results:    Recent Labs   Lab Test 07/14/25  0700 07/13/25  0740 07/12/25  0625   WBC 6.4 5.7 4.6   HGB 11.7* 13.0* 12.1*   * 101* 101*   PLT 90* 71* 58*   INR 1.95* 1.76* 1.69*     Recent Labs   Lab Test 07/15/25  0622 07/14/25  0700 07/13/25  0740   POTASSIUM 3.5 3.5 3.8   CHLORIDE 90* 95* 91*   CO2 23 25 25   BUN 14.2 14.1 14.9   ANIONGAP 17* 14 17*     Paracentesis 7/10/25:   Latest Reference Range & Units 07/10/25 12:14   Cell Count Fluid Source  Paracentesis   Total Nucleated Cells /uL 91,980   Absolute Neutrophils, Body Fluid /uL 89,220.6 ()   % Neutrophils Fluid % 97   % Lymphocytes Fluid % 0   % Mono/Macro Fluid % 3   Color Fluid Colorless, Yellow  Orange !   Appearance Fluid Clear  Cloudy !   Albumin Fluid Source  Paracentesis   Albumin Fluid g/dL 2.4   Cultures negative       MELD 3.0: 25 at 7/15/2025  6:22 AM  MELD-Na: 28 at 7/15/2025  6:22 AM  Calculated from:  Serum Creatinine: 0.73 mg/dL (Using min of 1 mg/dL) at 7/15/2025  6:22 AM  Serum Sodium: 130 mmol/L at 7/15/2025  6:22 AM  Total Bilirubin: 12.5 mg/dL at 7/15/2025  6:22 AM  Serum Albumin: 3.4 g/dL at 7/15/2025  6:22 AM  INR(ratio): 1.95 at 7/14/2025  7:00 AM  Age at listing (hypothetical): 35 years  Sex: Male at 7/15/2025  6:22  AM      ENDOSCOPIC EVALUATION     No prior endoscopies      ASSESSMENT AND PLAN     Assessment:  35 year old male without significant PMH other than moderate alcohol use,  that presented on 7/8 with symptoms of RUQ abdominal pain.     Presenting labs showed AST of 231, ALT of 64, alk phos of 286, total bili of 5.9, normal lipase, and platelet level of 68.  Paracentesis      Paracentesis with absolute neutrophil count of > 89,000.     Liver cirrhosis noted on imaging.  Hepatic panel AST> ALT, alcoholic pattern/alcoholic hepatitis. MRCP negative for biliary etiology of elevated LFTs.       Hepatitis B/C negative, iron studies negative for iron overload.         Plan:    -appreciate IM consulting ID due to fever overnight     -continue IV ceftriaxone, most recent US did not indicate large area of ascites that could be tapped (if he is responsive to antibiotics, give 5 day course, then transition to Cipro 500 mg indefinitely for prophylaxis)     -consider initiation of diuretics when hyponatremia normalizes (additionally had ISABELLE, but Cr has now normalzied    -consider US tomorrow, 7/16 to evaluate for possible repeat paracentesis-the additional pressure may be contributing to the nausea/vomiting he was experiencing last night      -continue to ADAT to 2 G sodium restriction diet      -unable to give steroids for alcoholic hepatitis 2/2 SBP    -trend MELD     -agree with thiamine, folic acid, appreciate alcohol withdrawal management per primary team     -alcohol cessation      -will need follow up in hepatology with [facility]       14 minutes of total time was spent providing patient care, including patient evaluation, reviewing documentation/test results, and .     I will update Dr. Redding, GI staff  Laura Chan NP   [facility]  [phone]

## 2025-07-15 NOTE — PLAN OF CARE
Goal Outcome Evaluation:    Patient vital signs are at baseline: No,  Reason: max T:102.3 F, tachycardia 120s  Patient able to ambulate as they were prior to admission or with assist devices provided by therapies during their stay:  No,  Reason:  severe weakness & joint pain; assist of 2 with a lift   Patient MUST void prior to discharge:  Yes  Patient able to tolerate oral intake:  Yes. Poor food intake. Encouraged fluids.   Pain has adequate pain control using Oral analgesics:  No,  Reason: unable to move due to generalized joint pain; takes Tylenol and Oxy for pain   Does patient have an identified :  Yes, spouse   Has goal D/C date and time been discussed with patient:  No,  Reason:  when medically ready     Pt is alert and oriented X4. HR is tachy per tele monitor. Uses IS for respiratory. Had an episode of emesis X1, used antiemetic. Skin is jaundice. Severely impaired mobility, applied air matteress for skin integrity. Bottom is pink and blanchable. Refused to be transferred to chair and lay off of posterior side. Has severe joint pain.Joints are swollen and hand joint knuckles on left hand red. Hemoglobin is 12.2. WBC 12.1.Pt had increased temperature of 102.3, dropped to 99.3 after Tylenol. Switched to regular diet. Eating with wife at the bedside. Pt is in agreement of care plan.  Went to paracentesis 0 ml removed. Left abd bandaide clean and dry.   Abd distended and rounded.   Remote tele ST.        Plan of Care Reviewed With: patient, spouse                   Problem: Adult Inpatient Plan of Care  Goal: Plan of Care Review  Description: The Plan of Care Review/Shift note should be completed every shift.  The Outcome Evaluation is a brief statement about your assessment that the patient is improving, declining, or no change.  This information will be displayed automatically on your shift  note.  Outcome: Not Progressing  Flowsheets (Taken 7/15/2025 1345)  Plan of Care Reviewed With:   patient    "spouse  Goal: Patient-Specific Goal (Individualized)  Description: You can add care plan individualizations to a care plan. Examples of Individualization might be:  \"Parent requests to be called daily at 9am for status\", \"I have a hard time hearing out of my right ear\", or \"Do not touch me to wake me up as it startles  me\".  Outcome: Not Progressing  Goal: Absence of Hospital-Acquired Illness or Injury  Outcome: Not Progressing  Goal: Optimal Comfort and Wellbeing  Outcome: Not Progressing  Goal: Readiness for Transition of Care  Outcome: Not Progressing     Problem: Pain Acute  Goal: Optimal Pain Control and Function  Outcome: Not Progressing     Problem: Electrolyte Imbalance  Goal: Electrolyte Balance  Outcome: Not Progressing     Problem: Excessive Substance Use  Goal: Optimized Energy Level (Excessive Substance Use)  Outcome: Not Progressing  Goal: Improved Behavioral Control (Excessive Substance Use)  Outcome: Not Progressing  Goal: Increased Participation and Engagement (Excessive Substance Use)  Outcome: Not Progressing  Goal: Improved Physiologic Symptoms (Excessive Substance Use)  Outcome: Not Progressing  Goal: Enhanced Social, Occupational or Functional Skills (Excessive Substance Use)  Outcome: Not Progressing       "

## 2025-07-15 NOTE — PROGRESS NOTES
Patient seen full note to follow, unclear situation, grossly abnormal peritoneal fluid suggestive of SBP but cultures negative, clinically improved on antibiotics but now new fever, cause of this is unclear still quite distended abdomen but apparently not able to tap any further fluid, for now would simply watch fever, labs and clinical symptoms still on ceftriaxone if not improving reconsider either expanding coverage or bigger workup

## 2025-07-15 NOTE — PROGRESS NOTES
Pt had has a temp of 101.6. he has been tachycardic on 130s. Reports nausea. No chest pain no lightheadedness. Feeling generalized tightness on his abdomen. MD notified. Blood cultures orders obtained. Continue IV abx.

## 2025-07-15 NOTE — PROGRESS NOTES
"CLINICAL NUTRITION SERVICES - ASSESSMENT NOTE    RECOMMENDATIONS FOR MDs/PROVIDERS TO ORDER:  Continue scheduled micronutrients.     Registered Dietitian Interventions:  Education: Discussed and provided handout regarding nutrition therapy for cirrhosis.     Patient made aware of option for scheduled or prn oral nutrition supplement. Patient verbalizes understanding and will focus on slowly introducing familiar solid foods first.     Recommend daily MVI to meet RDIs vitamins given suboptimal intakes for >/= 7 days and reported hx of alcohol use PTA. Ordered w/ cosign.      Future/Additional Recommendations:  Oral intake encouragement appreciated.   RD team will monitor acutely per policy.      REASON FOR ASSESSMENT  LOS    Admit 2/2 evaluation of abdominal pain. 10-15 yr hx of alcohol use, noted \"reports drinking 3-4 hard liquor drinks each day\". MST indicates no PTA wt loss or reduction in po intakes.     INFORMATION OBTAINED  Assessed patient in room.    NUTRITION HISTORY  Information obtained from patient and spouse in room.   -- Intakes PTA: No changes. Typically pt eats 2-3 meals daily and occasional snacks. May skip breakfast or have a PB&J/honey sandwich. Relies on more convenient options for lunch, including [place] salads/sandwiches. Dinner at home cooked by pt, picks protein source first and usually has potatoes + veg on side. Snacks on chips at work, occasionally has a protein shake.   -- Allergies/intolerances: NKFA.    CURRENT NUTRITION ORDERS  Diet: Regular, adv today  Noted: NPO 7/8 & 7/13, CLD-FLD 7/14    CURRENT INTAKE/TOLERANCE  - 0% x1 intake documented per nursing flowsheets since admission.   - Pt has received a total of 3 orders from the kitchen since admission per [facility]. Largest meal totaling 271 kcal and 3 g pro.   - Pt tells me this is the first day he has eaten solid food. He tells me there was a brief \"2 hour timeframe\" where he could eat but has otherwise been on liquid diet. " "    LABS  Nutrition-relevant labs: Reviewed  Noted: Na 130 (L) Alk Phos 156 (H)  (H) tBili 12.5 (H)    MEDICATIONS  Nutrition-relevant medications: Reviewed  Noted: Colace BID, Folic acid, Protonix BID, Miralax (given prn), Thiamine, Compazine prn    ANTHROPOMETRICS  Height: 182.9 cm (6' 0\")  Admission Weight: 106.6 kg (235 lb) (07/08/25 1505)   Most Recent Weight: 111.4 kg (245 lb 9.5 oz) (07/15/25 0821)  IBW: 80.9 kg  BMI: Body mass index is 33.31 kg/m .   Weight History: Difficult to assess true wt loss vs fluid shifts acutely. Limited wt hx indicates wt trending up over the past 9 months.   Wt Readings from Last 10 Encounters:   07/15/25 111.4 kg (245 lb 9.5 oz)   10/28/24 103.4 kg (228 lb)     This admission:   07/15/25 0821 111.4 kg (245 lb 9.5 oz) -   07/10/25 0809 115.7 kg (255 lb 1.2 oz) Standing scale   07/08/25 2042 112.8 kg (248 lb 9.6 oz) Standing scale   07/08/25 1505 106.6 kg (235 lb) -     Dosing Weight: 115.7 kg, based on most recent standing weight; 80.9 kg IBW for protein    ASSESSED NUTRITION NEEDS  Estimated Energy Needs: 6872-2121 kcals/day ([place] Jeor -250 kcal)  Justification: Obese, maintenance and mild wt loss w/ cirrhosis  Estimated Protein Needs:  grams protein/day (1.2 - 1.5 grams of pro/kg)  Justification: Cirrhosis  Estimated Fluid Needs: Per provider    SYSTEM AND PHYSICAL FINDINGS    GI and nephrology following.   Paracentesis 7/10 660 mL drained.     GI symptoms:  -- Stooling: Having loose stools, on bowel reg, last BM 7/14  -- Emesis: Intermittent nausea, antiemetic meds prn, last emesis early this morning    Skin/wounds:   -- Edema: 1+ bilateral leg/foot, 3+ L knee, 2+ hands  -- Pressure injuries: None indicated    MALNUTRITION  % Intake: </= 50% for >/= 5 days (severe)  % Weight Loss: Weight loss does not meet criteria   Subcutaneous Fat Loss: None observed  Muscle Loss: None observed  Fluid Accumulation/Edema: Mild, 1+  Malnutrition Diagnosis: Moderate " "malnutrition in the context of acute illness or injury  Malnutrition Present on Admission: Unable to assess    NUTRITION DIAGNOSIS  Inadequate oral intake related to medical status/GI s/sx as evidenced by diet order restrictions, pt report of intakes during admission, documentation and orders per [facility].     INTERVENTIONS  See nutrition interventions above    GOALS  Tolerate regular diet  PO intakes >/= 50% TID     MONITORING/EVALUATION  Progress toward goals will be monitored and evaluated per policy.    Dorothy Fraser MS, RD, LD  [facility] Message Group: \"Dietitian [[place]]\"    "

## 2025-07-15 NOTE — PROGRESS NOTES
[facility]    Medicine Progress Note - Hospitalist Service    Date of Admission:  7/8/2025    Assessment & Plan      Ricci Brown is a 35 year old male admitted on 7/8/2025. He has little past medical history who presents to the ED for evaluation of abdominal pain.   Terence stated that unfortunately he has been regular EtOH drinker at least for the last 10 to 15 years.      Problem list:    #Recurrent fever  #New leukocytosis    -Overnight had another bouts of fever spikes  - He has been afebrile at least in the last 4 days  - He developed new leukocytosis  - Previously his procalcitonin was also already trending down  - He continued on IV antibiotics currently receiving IV ceftriaxone  - Pancultured today  - Check UA  - Requested abdominal ultrasound and pursuing diagnostic and therapeutic paracentesis if with significant ascites.  Most recent ultrasound done last Sunday did not show any significant fluid that can be drained.  - Cultures done earlier still showing no growth up to date  - I requested formal ID service input regarding this recurrent fever spikes.  - Fortunately he is demonstrating stable hemodynamics, no alteration mental state, overall he he is not showing rapid clinical deterioration.  He is not requiring oxygen support.  Stable blood pressure levels.  - He demonstrated some bowel movement yesterday.  No reported diarrhea, no urinary symptoms.  No bleeding tendencies      #Nausea, vomiting  #Abdominal distention  #Suspected ileus versus beginning constipation.  Versus possible small bowel obstruction  - Earlier had multiple passing of flatus and BM  - Tolerating full liquids  - Will advance to regular diet see if he can tolerate it    - Discussed with him importance of ambulation and physical activity with underlying ileus  - Trial of Dulcolax suppository if able  - As needed antiemetics, PPI on board      #Bilateral knee pain  -He is more receptive for out of bed  activities and working with therapy and nursing services for ambulatory activities and exercises    - Serum uric acid normal  - No ballotable swelling, not erythema on bilateral knees, not warm to touch  - Reassuring bilateral knee x-ray  - He mentioned that he had some issues with bilateral knee pain in the past requiring for him to take daily NSAIDs.  Discussed with him that NSAIDs is not the best regimen for him as of now in the setting of underlying liver cirrhosis, recent ISABELLE, I disposition for bleeding tendencies.  - May utilize as needed APAP  - May utilize as needed oxycodone.  - Examination of other joints did not show any swelling, limitation range of motion.  No prior history of gouty arthritis.  - Avoiding corticosteroids if possible given underlying sepsis and ongoing infection and peritonitis  - PT and OT input    #Sepsis with tachycardia, fever spikes, lactic acidosis secondary to spontaneous bacterial peritonitis    - Tolerated diagnostic and therapeutic paracentesis last 7/10/2025  - Drained 660 mL of cloudy ascitic fluid  - Differential study showing significant neutrophil count  - Elevated procalcitonin  -Has a pending repeat procalcitonin just for monitoring purposes.  Anticipating improvement given concurrent antibiotics and improving clinical appearance  -GI requesting for repeat paracentesis  -Remain on IV antibiotics currently on ceftriaxone      #Acute kidney injury secondary to underlying IV contrast nephropathy  #Persistent hyperkalemia  #High suspicion for contrast IV nephropathy  #Doubtful for hepatorenal syndrome    - Highly appreciate input from nephrology service  -Hyperkalemia resolved  -Albumin level normal.  No hypotension.  -May do trial of diuresis the next couple of days if continues to demonstrate stable kidney function and electrolytes.  I believe patient recovered from recent ISABELLE with underlying contrast nephropathy.  -  -Avoid NSAIDs  - Avoid nephrotoxic agents  - Will await  further instructions and recommendations from nephrology service      #Abdominal pain, jaundice  #Newly diagnosed possible liver cirrhosis  #Suspected cholecystitis, no clear evidence of choledocholithiasis  #Possible alcoholic gastritis  #Alcohol abuse, alcohol dependence  .      Alcohol use  High risk for alcohol withdrawals  Reports drinking 3-4 hard liquor drinks each day. Denies ever going through withdrawal. Does have some tachycardia, may be related to above issues but will monitor on CIWA with symptom triggered benzos for now.   - CIWA monitoring with symptom triggered benzodiazepines  - Gabapentin taper  - Thiamine and folate replacement  Extensive discussion ensued regarding the need and importance of complete EtOH cessation in the setting of this findings and diagnosis of liver cirrhosis, hepatitis, jaundice and now with concomitant kidney injury          Diet: Regular Diet Adult    DVT Prophylaxis: Pneumatic Compression Devices and Ambulate every shift  Starr Catheter: Not present  Lines: None     Cardiac Monitoring: ACTIVE order. Indication: Tachyarrhythmias, acute (48 hours)  Code Status: Full Code      Clinically Significant Risk Factors         # Hyponatremia: Lowest Na = 130 mmol/L in last 2 days, will monitor as appropriate  # Hypochloremia: Lowest Cl = 90 mmol/L in last 2 days, will monitor as appropriate      # Hypoalbuminemia: Lowest albumin = 3.1 g/dL at 7/11/2025  5:49 AM, will monitor as appropriate  # Coagulation Defect: INR = 1.95 (Ref range: 0.85 - 1.15) and/or PTT = N/A, will monitor for bleeding  # Thrombocytopenia: Lowest platelets = 90 in last 2 days, will monitor for bleeding                # Obesity: Estimated body mass index is 33.31 kg/m  as calculated from the following:    Height as of this encounter: 1.829 m (6').    Weight as of this encounter: 111.4 kg (245 lb 9.5 oz).             Social Drivers of Health     Received from "[employer] [facility]    [employer]  [facility]          Disposition Plan     Medically Ready for Discharge: Anticipated in 2-4 Days            Irvin Andrea MD, MD  Hospitalist Service  [facility]  Securely message with [employer] (more info)  Text page via [employer] Paging/Directory   ______________________________________________________________________    Interval History   Continuing medicine service care.    Seen and examined.  Case discussed with nursing service.   Family updated this patient's wife as always present at bedside.  Fortunately Terence appears to be somewhat in better spirits today than yesterday.  He is more interactive and conversational.  Reportedly he is also more participatory with ambulatory activity and therapy services.  He still having intermittent abdominal discomfort, had some emesis earlier.  But fortunately demonstrated bowel movement yesterday.  No reported bleeding tendencies.  Currently not requiring oxygen support.  Denies any chest pain or shortness of air.  However this morning he exhibited fever spikes.  He has been afebrile in the last several days.  No alteration in mental state noted by family and nursing service.          Physical Exam   Vital Signs: Temp: 100.9  F (38.3  C) Temp src: Axillary BP: (!) 160/78 Pulse: 120   Resp: 20 SpO2: 92 % O2 Device: None (Room air)    Weight: 245 lbs 9.48 oz    HEENT; Atraumatic, normocephalic, pinkish conjuctiva, pupils bilateral reactive   Icteric sclerae  Skin: warm and moist, no rashes  Jaundice  Lymphatics: no cervical or axillary lymphandenopathy  Lungs: equal chest expansion, clear to auscultation, no wheezes, no stridor, no crackles,   Heart: Improving tachycardic rate, normal rhythm, no rubs or gallops.   Abdomen: Positive bowel sounds, no ongoing tenderness upon palpation, distended, nontender upon palpation, tympanic  Extremities: no deformities,  bilateral lower extremity nonpitting edema   Neuro; follow commands, alert and oriented x3,  spontaneous speech, coherent, moves all extremities spontaneously  Psych; no hallucination, euthymic mood, not agitated      Medical Decision Making       50 MINUTES SPENT BY ME on the date of service doing chart review, history, exam, documentation & further activities per the note.  MANAGEMENT DISCUSSED with the following over the past 24 hours: Yes   NOTE(S)/MEDICAL RECORDS REVIEWED over the past 24 hours: Yes      Data     I have personally reviewed the following data over the past 24 hrs:    12.1 (H)  \   12.2 (L)   / 135 (L)     130 (L) 90 (L) 14.2 /  112 (H)   3.5 23 0.73 \     ALT: 47 AST: 108 (H) AP: 156 (H) TBILI: 12.5 (H)   ALB: 3.4 (L) TOT PROTEIN: 7.6 LIPASE: N/A       Imaging results reviewed over the past 24 hrs:   No results found for this or any previous visit (from the past 24 hours).

## 2025-07-16 ENCOUNTER — APPOINTMENT (OUTPATIENT)
Dept: CT IMAGING | Facility: CLINIC | Age: 35
End: 2025-07-16
Attending: INTERNAL MEDICINE
Payer: COMMERCIAL

## 2025-07-16 ENCOUNTER — ANESTHESIA EVENT (OUTPATIENT)
Dept: SURGERY | Facility: CLINIC | Age: 35
End: 2025-07-16
Payer: COMMERCIAL

## 2025-07-16 ENCOUNTER — APPOINTMENT (OUTPATIENT)
Dept: PHYSICAL THERAPY | Facility: CLINIC | Age: 35
End: 2025-07-16
Payer: COMMERCIAL

## 2025-07-16 ENCOUNTER — ANESTHESIA (OUTPATIENT)
Dept: SURGERY | Facility: CLINIC | Age: 35
End: 2025-07-16
Payer: COMMERCIAL

## 2025-07-16 LAB
ABO + RH BLD: NORMAL
ALBUMIN SERPL BCG-MCNC: 3.4 G/DL (ref 3.5–5.2)
ALP SERPL-CCNC: 150 U/L (ref 40–150)
ALT SERPL W P-5'-P-CCNC: 46 U/L (ref 0–70)
ANION GAP SERPL CALCULATED.3IONS-SCNC: 16 MMOL/L (ref 7–15)
AST SERPL W P-5'-P-CCNC: 101 U/L (ref 0–45)
BASOPHILS # BLD AUTO: 0 10E3/UL (ref 0–0.2)
BASOPHILS NFR BLD AUTO: 0 %
BILIRUB SERPL-MCNC: 13.4 MG/DL
BLD GP AB SCN SERPL QL: NEGATIVE
BUN SERPL-MCNC: 18.6 MG/DL (ref 6–20)
CALCIUM SERPL-MCNC: 9.3 MG/DL (ref 8.8–10.4)
CHLORIDE SERPL-SCNC: 88 MMOL/L (ref 98–107)
CK SERPL-CCNC: 23 U/L (ref 39–308)
CREAT SERPL-MCNC: ABNORMAL MG/DL
EGFRCR SERPLBLD CKD-EPI 2021: ABNORMAL ML/MIN/{1.73_M2}
EOSINOPHIL # BLD AUTO: 0 10E3/UL (ref 0–0.7)
EOSINOPHIL NFR BLD AUTO: 0 %
ERYTHROCYTE [DISTWIDTH] IN BLOOD BY AUTOMATED COUNT: 13.7 % (ref 10–15)
GLUCOSE SERPL-MCNC: 126 MG/DL (ref 70–99)
HCO3 SERPL-SCNC: 24 MMOL/L (ref 22–29)
HCT VFR BLD AUTO: 33.3 % (ref 40–53)
HGB BLD-MCNC: 11.9 G/DL (ref 13.3–17.7)
IMM GRANULOCYTES # BLD: 0.2 10E3/UL
IMM GRANULOCYTES NFR BLD: 2 %
LYMPHOCYTES # BLD AUTO: 0.5 10E3/UL (ref 0.8–5.3)
LYMPHOCYTES NFR BLD AUTO: 5 %
MCH RBC QN AUTO: 35.4 PG (ref 26.5–33)
MCHC RBC AUTO-ENTMCNC: 35.7 G/DL (ref 31.5–36.5)
MCV RBC AUTO: 99 FL (ref 78–100)
MONOCYTES # BLD AUTO: 0.4 10E3/UL (ref 0–1.3)
MONOCYTES NFR BLD AUTO: 4 %
NEUTROPHILS # BLD AUTO: 9.9 10E3/UL (ref 1.6–8.3)
NEUTROPHILS NFR BLD AUTO: 89 %
NRBC # BLD AUTO: 0 10E3/UL
NRBC BLD AUTO-RTO: 0 /100
PLATELET # BLD AUTO: 145 10E3/UL (ref 150–450)
POTASSIUM SERPL-SCNC: 3.4 MMOL/L (ref 3.4–5.3)
PROCALCITONIN SERPL IA-MCNC: 1.3 NG/ML
PROT SERPL-MCNC: 7.5 G/DL (ref 6.4–8.3)
RBC # BLD AUTO: 3.36 10E6/UL (ref 4.4–5.9)
SODIUM SERPL-SCNC: 128 MMOL/L (ref 135–145)
SPECIMEN EXP DATE BLD: NORMAL
WBC # BLD AUTO: 11.2 10E3/UL (ref 4–11)

## 2025-07-16 PROCEDURE — 99232 SBSQ HOSP IP/OBS MODERATE 35: CPT | Performed by: INTERNAL MEDICINE

## 2025-07-16 PROCEDURE — 99255 IP/OBS CONSLTJ NEW/EST HI 80: CPT | Mod: 25 | Performed by: SURGERY

## 2025-07-16 PROCEDURE — 49320 DIAG LAPARO SEPARATE PROC: CPT | Performed by: SURGERY

## 2025-07-16 PROCEDURE — 86900 BLOOD TYPING SEROLOGIC ABO: CPT | Performed by: SURGERY

## 2025-07-16 PROCEDURE — 999N000141 HC STATISTIC PRE-PROCEDURE NURSING ASSESSMENT: Performed by: SURGERY

## 2025-07-16 PROCEDURE — 250N000009 HC RX 250: Performed by: NURSE ANESTHETIST, CERTIFIED REGISTERED

## 2025-07-16 PROCEDURE — 84155 ASSAY OF PROTEIN SERUM: CPT | Performed by: INTERNAL MEDICINE

## 2025-07-16 PROCEDURE — 250N000011 HC RX IP 250 OP 636: Performed by: NURSE ANESTHETIST, CERTIFIED REGISTERED

## 2025-07-16 PROCEDURE — 250N000025 HC SEVOFLURANE, PER MIN: Performed by: SURGERY

## 2025-07-16 PROCEDURE — 250N000013 HC RX MED GY IP 250 OP 250 PS 637: Performed by: INTERNAL MEDICINE

## 2025-07-16 PROCEDURE — 99233 SBSQ HOSP IP/OBS HIGH 50: CPT | Performed by: INTERNAL MEDICINE

## 2025-07-16 PROCEDURE — 36415 COLL VENOUS BLD VENIPUNCTURE: CPT | Performed by: SURGERY

## 2025-07-16 PROCEDURE — 97530 THERAPEUTIC ACTIVITIES: CPT | Mod: GP | Performed by: PHYSICAL THERAPIST

## 2025-07-16 PROCEDURE — 82550 ASSAY OF CK (CPK): CPT | Performed by: INTERNAL MEDICINE

## 2025-07-16 PROCEDURE — 272N000001 HC OR GENERAL SUPPLY STERILE: Performed by: SURGERY

## 2025-07-16 PROCEDURE — 49320 DIAG LAPARO SEPARATE PROC: CPT | Mod: AS | Performed by: PHYSICIAN ASSISTANT

## 2025-07-16 PROCEDURE — 360N000076 HC SURGERY LEVEL 3, PER MIN: Performed by: SURGERY

## 2025-07-16 PROCEDURE — 36415 COLL VENOUS BLD VENIPUNCTURE: CPT | Performed by: INTERNAL MEDICINE

## 2025-07-16 PROCEDURE — 250N000009 HC RX 250: Performed by: SURGERY

## 2025-07-16 PROCEDURE — 258N000003 HC RX IP 258 OP 636: Performed by: NURSE ANESTHETIST, CERTIFIED REGISTERED

## 2025-07-16 PROCEDURE — 99207 PR NO BILLABLE SERVICE THIS VISIT: CPT | Performed by: INTERNAL MEDICINE

## 2025-07-16 PROCEDURE — 74176 CT ABD & PELVIS W/O CONTRAST: CPT

## 2025-07-16 PROCEDURE — 250N000013 HC RX MED GY IP 250 OP 250 PS 637: Performed by: STUDENT IN AN ORGANIZED HEALTH CARE EDUCATION/TRAINING PROGRAM

## 2025-07-16 PROCEDURE — 710N000009 HC RECOVERY PHASE 1, LEVEL 1, PER MIN: Performed by: SURGERY

## 2025-07-16 PROCEDURE — 120N000001 HC R&B MED SURG/OB

## 2025-07-16 PROCEDURE — 250N000011 HC RX IP 250 OP 636: Performed by: SURGERY

## 2025-07-16 PROCEDURE — 258N000003 HC RX IP 258 OP 636: Performed by: SURGERY

## 2025-07-16 PROCEDURE — 84145 PROCALCITONIN (PCT): CPT | Performed by: INTERNAL MEDICINE

## 2025-07-16 PROCEDURE — 258N000003 HC RX IP 258 OP 636: Performed by: ANESTHESIOLOGY

## 2025-07-16 PROCEDURE — 250N000011 HC RX IP 250 OP 636: Performed by: INTERNAL MEDICINE

## 2025-07-16 PROCEDURE — 370N000017 HC ANESTHESIA TECHNICAL FEE, PER MIN: Performed by: SURGERY

## 2025-07-16 PROCEDURE — 85004 AUTOMATED DIFF WBC COUNT: CPT | Performed by: INTERNAL MEDICINE

## 2025-07-16 RX ORDER — DEXAMETHASONE SODIUM PHOSPHATE 4 MG/ML
INJECTION, SOLUTION INTRA-ARTICULAR; INTRALESIONAL; INTRAMUSCULAR; INTRAVENOUS; SOFT TISSUE PRN
Status: DISCONTINUED | OUTPATIENT
Start: 2025-07-16 | End: 2025-07-16

## 2025-07-16 RX ORDER — SODIUM CHLORIDE 9 MG/ML
INJECTION, SOLUTION INTRAVENOUS CONTINUOUS
Status: ACTIVE | OUTPATIENT
Start: 2025-07-16

## 2025-07-16 RX ORDER — DEXAMETHASONE SODIUM PHOSPHATE 4 MG/ML
4 INJECTION, SOLUTION INTRA-ARTICULAR; INTRALESIONAL; INTRAMUSCULAR; INTRAVENOUS; SOFT TISSUE
Status: DISCONTINUED | OUTPATIENT
Start: 2025-07-16 | End: 2025-07-16 | Stop reason: HOSPADM

## 2025-07-16 RX ORDER — LIDOCAINE HYDROCHLORIDE 20 MG/ML
INJECTION, SOLUTION INFILTRATION; PERINEURAL PRN
Status: DISCONTINUED | OUTPATIENT
Start: 2025-07-16 | End: 2025-07-16

## 2025-07-16 RX ORDER — BUPIVACAINE HYDROCHLORIDE AND EPINEPHRINE 2.5; 5 MG/ML; UG/ML
INJECTION, SOLUTION EPIDURAL; INFILTRATION; INTRACAUDAL; PERINEURAL PRN
Status: DISCONTINUED | OUTPATIENT
Start: 2025-07-16 | End: 2025-07-16 | Stop reason: HOSPADM

## 2025-07-16 RX ORDER — ONDANSETRON 2 MG/ML
4 INJECTION INTRAMUSCULAR; INTRAVENOUS EVERY 30 MIN PRN
Status: DISCONTINUED | OUTPATIENT
Start: 2025-07-16 | End: 2025-07-16 | Stop reason: HOSPADM

## 2025-07-16 RX ORDER — FENTANYL CITRATE 50 UG/ML
INJECTION, SOLUTION INTRAMUSCULAR; INTRAVENOUS PRN
Status: DISCONTINUED | OUTPATIENT
Start: 2025-07-16 | End: 2025-07-16

## 2025-07-16 RX ORDER — FENTANYL CITRATE 50 UG/ML
25 INJECTION, SOLUTION INTRAMUSCULAR; INTRAVENOUS EVERY 5 MIN PRN
Status: DISCONTINUED | OUTPATIENT
Start: 2025-07-16 | End: 2025-07-16 | Stop reason: HOSPADM

## 2025-07-16 RX ORDER — METRONIDAZOLE 500 MG/100ML
500 INJECTION, SOLUTION INTRAVENOUS
Status: COMPLETED | OUTPATIENT
Start: 2025-07-16 | End: 2025-07-16

## 2025-07-16 RX ORDER — NALOXONE HYDROCHLORIDE 0.4 MG/ML
0.1 INJECTION, SOLUTION INTRAMUSCULAR; INTRAVENOUS; SUBCUTANEOUS
Status: DISCONTINUED | OUTPATIENT
Start: 2025-07-16 | End: 2025-07-16 | Stop reason: HOSPADM

## 2025-07-16 RX ORDER — FENTANYL CITRATE 50 UG/ML
50 INJECTION, SOLUTION INTRAMUSCULAR; INTRAVENOUS EVERY 5 MIN PRN
Status: DISCONTINUED | OUTPATIENT
Start: 2025-07-16 | End: 2025-07-16 | Stop reason: HOSPADM

## 2025-07-16 RX ORDER — ONDANSETRON 4 MG/1
4 TABLET, ORALLY DISINTEGRATING ORAL EVERY 30 MIN PRN
Status: DISCONTINUED | OUTPATIENT
Start: 2025-07-16 | End: 2025-07-16 | Stop reason: HOSPADM

## 2025-07-16 RX ORDER — PROPOFOL 10 MG/ML
INJECTION, EMULSION INTRAVENOUS PRN
Status: DISCONTINUED | OUTPATIENT
Start: 2025-07-16 | End: 2025-07-16

## 2025-07-16 RX ORDER — SODIUM CHLORIDE, SODIUM LACTATE, POTASSIUM CHLORIDE, CALCIUM CHLORIDE 600; 310; 30; 20 MG/100ML; MG/100ML; MG/100ML; MG/100ML
INJECTION, SOLUTION INTRAVENOUS CONTINUOUS
Status: DISCONTINUED | OUTPATIENT
Start: 2025-07-16 | End: 2025-07-16 | Stop reason: HOSPADM

## 2025-07-16 RX ORDER — ONDANSETRON 2 MG/ML
INJECTION INTRAMUSCULAR; INTRAVENOUS PRN
Status: DISCONTINUED | OUTPATIENT
Start: 2025-07-16 | End: 2025-07-16

## 2025-07-16 RX ORDER — HYDROMORPHONE HCL IN WATER/PF 6 MG/30 ML
0.4 PATIENT CONTROLLED ANALGESIA SYRINGE INTRAVENOUS EVERY 5 MIN PRN
Status: DISCONTINUED | OUTPATIENT
Start: 2025-07-16 | End: 2025-07-16 | Stop reason: HOSPADM

## 2025-07-16 RX ORDER — HYDROMORPHONE HCL IN WATER/PF 6 MG/30 ML
0.2 PATIENT CONTROLLED ANALGESIA SYRINGE INTRAVENOUS EVERY 5 MIN PRN
Status: DISCONTINUED | OUTPATIENT
Start: 2025-07-16 | End: 2025-07-16 | Stop reason: HOSPADM

## 2025-07-16 RX ORDER — CEFAZOLIN SODIUM/WATER 2 G/20 ML
2 SYRINGE (ML) INTRAVENOUS SEE ADMIN INSTRUCTIONS
Status: DISCONTINUED | OUTPATIENT
Start: 2025-07-16 | End: 2025-07-16 | Stop reason: HOSPADM

## 2025-07-16 RX ORDER — CEFAZOLIN SODIUM/WATER 2 G/20 ML
2 SYRINGE (ML) INTRAVENOUS
Status: COMPLETED | OUTPATIENT
Start: 2025-07-16 | End: 2025-07-16

## 2025-07-16 RX ADMIN — CEFTRIAXONE 2 G: 2 INJECTION, POWDER, FOR SOLUTION INTRAMUSCULAR; INTRAVENOUS at 09:58

## 2025-07-16 RX ADMIN — DOCUSATE SODIUM 100 MG: 100 CAPSULE, LIQUID FILLED ORAL at 10:49

## 2025-07-16 RX ADMIN — SODIUM CHLORIDE, SODIUM LACTATE, POTASSIUM CHLORIDE, AND CALCIUM CHLORIDE: .6; .31; .03; .02 INJECTION, SOLUTION INTRAVENOUS at 18:23

## 2025-07-16 RX ADMIN — GABAPENTIN 300 MG: 300 CAPSULE ORAL at 05:16

## 2025-07-16 RX ADMIN — ONDANSETRON 4 MG: 2 INJECTION INTRAMUSCULAR; INTRAVENOUS at 19:12

## 2025-07-16 RX ADMIN — PROPOFOL 200 MG: 10 INJECTION, EMULSION INTRAVENOUS at 18:30

## 2025-07-16 RX ADMIN — MIDAZOLAM 2 MG: 1 INJECTION INTRAMUSCULAR; INTRAVENOUS at 18:23

## 2025-07-16 RX ADMIN — MULTIVITAMIN TABLET 1 TABLET: TABLET at 10:53

## 2025-07-16 RX ADMIN — DEXMEDETOMIDINE HYDROCHLORIDE 20 MCG: 100 INJECTION, SOLUTION INTRAVENOUS at 19:12

## 2025-07-16 RX ADMIN — CALCIUM CARBONATE (ANTACID) CHEW TAB 500 MG 1000 MG: 500 CHEW TAB at 07:17

## 2025-07-16 RX ADMIN — PANTOPRAZOLE SODIUM 40 MG: 40 TABLET, DELAYED RELEASE ORAL at 06:36

## 2025-07-16 RX ADMIN — ROCURONIUM BROMIDE 50 MG: 50 INJECTION, SOLUTION INTRAVENOUS at 18:30

## 2025-07-16 RX ADMIN — GABAPENTIN 100 MG: 100 CAPSULE ORAL at 11:51

## 2025-07-16 RX ADMIN — Medication 2 G: at 18:15

## 2025-07-16 RX ADMIN — OXYCODONE HYDROCHLORIDE 5 MG: 5 TABLET ORAL at 05:15

## 2025-07-16 RX ADMIN — FENTANYL CITRATE 100 MCG: 50 INJECTION INTRAMUSCULAR; INTRAVENOUS at 18:30

## 2025-07-16 RX ADMIN — SUGAMMADEX 200 MG: 100 INJECTION, SOLUTION INTRAVENOUS at 19:18

## 2025-07-16 RX ADMIN — METRONIDAZOLE 500 MG: 500 INJECTION, SOLUTION INTRAVENOUS at 17:22

## 2025-07-16 RX ADMIN — FOLIC ACID 1 MG: 1 TABLET ORAL at 10:49

## 2025-07-16 RX ADMIN — SODIUM CHLORIDE: 0.9 INJECTION, SOLUTION INTRAVENOUS at 21:09

## 2025-07-16 RX ADMIN — LIDOCAINE HYDROCHLORIDE 50 MG: 20 INJECTION, SOLUTION INFILTRATION; PERINEURAL at 18:30

## 2025-07-16 RX ADMIN — FEXOFENADINE HYDROCHLORIDE 60 MG: 60 TABLET ORAL at 10:49

## 2025-07-16 RX ADMIN — THIAMINE HCL TAB 100 MG 100 MG: 100 TAB at 10:49

## 2025-07-16 RX ADMIN — PROCHLORPERAZINE EDISYLATE 10 MG: 5 INJECTION INTRAMUSCULAR; INTRAVENOUS at 01:39

## 2025-07-16 RX ADMIN — DEXAMETHASONE SODIUM PHOSPHATE 8 MG: 4 INJECTION, SOLUTION INTRA-ARTICULAR; INTRALESIONAL; INTRAMUSCULAR; INTRAVENOUS; SOFT TISSUE at 18:30

## 2025-07-16 RX ADMIN — PANTOPRAZOLE SODIUM 40 MG: 40 TABLET, DELAYED RELEASE ORAL at 15:54

## 2025-07-16 RX ADMIN — PROCHLORPERAZINE EDISYLATE 10 MG: 5 INJECTION INTRAMUSCULAR; INTRAVENOUS at 09:57

## 2025-07-16 RX ADMIN — ACETAMINOPHEN 650 MG: 325 TABLET ORAL at 10:50

## 2025-07-16 ASSESSMENT — ACTIVITIES OF DAILY LIVING (ADL)
ADLS_ACUITY_SCORE: 40
ADLS_ACUITY_SCORE: 39
ADLS_ACUITY_SCORE: 38
ADLS_ACUITY_SCORE: 40
ADLS_ACUITY_SCORE: 41
ADLS_ACUITY_SCORE: 40
ADLS_ACUITY_SCORE: 41
ADLS_ACUITY_SCORE: 38
ADLS_ACUITY_SCORE: 40
ADLS_ACUITY_SCORE: 40
ADLS_ACUITY_SCORE: 39
ADLS_ACUITY_SCORE: 40
ADLS_ACUITY_SCORE: 40
ADLS_ACUITY_SCORE: 41
ADLS_ACUITY_SCORE: 40
ADLS_ACUITY_SCORE: 41

## 2025-07-16 NOTE — ANESTHESIA PREPROCEDURE EVALUATION
Anesthesia Pre-Procedure Evaluation    Patient: Ricci Brown   MRN: [id number] : 1990          Procedure : Procedure(s):  LAPAROSCOPY, DIAGNOSTIC, possible laparotomy possible bowel resection         No past medical history on file.   No past surgical history on file.   No Known Allergies   Social History     Tobacco Use    Smoking status: Not on file    Smokeless tobacco: Not on file   Substance Use Topics    Alcohol use: Not on file      Wt Readings from Last 1 Encounters:   07/15/25 111.4 kg (245 lb 9.5 oz)        Anesthesia Evaluation   Pt has had prior anesthetic.     No history of anesthetic complications       ROS/MED HX  ENT/Pulmonary:  - neg pulmonary ROS     Neurologic:  - neg neurologic ROS     Cardiovascular:  - neg cardiovascular ROS     METS/Exercise Tolerance:     Hematologic:  - neg hematologic  ROS     Musculoskeletal:  - neg musculoskeletal ROS     GI/Hepatic: Comment: EtOH with cirrhosis and ascites    (+)             liver disease,       Renal/Genitourinary:  - neg Renal ROS     Endo:  - neg endo ROS     Psychiatric/Substance Use:     (+)   alcohol abuse      Infectious Disease:  - neg infectious disease ROS     Malignancy:       Other:              Physical Exam  Airway  Mallampati: II  TM distance: >3 FB  Neck ROM: full    Cardiovascular - normal exam   Dental   (+) Multiple visibly decayed, broken teeth      Pulmonary - normal exam      Neurological - normal exam  He appears awake, alert and oriented x3.    Other Findings       OUTSIDE LABS:  CBC:   Lab Results   Component Value Date    WBC 11.2 (H) 2025    WBC 12.1 (H) 07/15/2025    HGB 11.9 (L) 2025    HGB 12.2 (L) 07/15/2025    HCT 33.3 (L) 2025    HCT 34.9 (L) 07/15/2025     (L) 2025     (L) 07/15/2025     BMP:   Lab Results   Component Value Date     (L) 2025     (L) 07/15/2025    POTASSIUM 3.4 2025    POTASSIUM 3.5 07/15/2025    CHLORIDE 88 (L) 2025     "CHLORIDE 90 (L) 07/15/2025    CO2 24 07/16/2025    CO2 23 07/15/2025    BUN 18.6 07/16/2025    BUN 14.2 07/15/2025    CR  07/16/2025      Comment:      Unsatisfactory specimen - icteric.    CR 0.73 07/15/2025     (H) 07/16/2025     (H) 07/15/2025     COAGS:   Lab Results   Component Value Date    INR 1.95 (H) 07/14/2025     POC: No results found for: \"BGM\", \"HCG\", \"HCGS\"  HEPATIC:   Lab Results   Component Value Date    ALBUMIN 3.4 (L) 07/16/2025    PROTTOTAL 7.5 07/16/2025    ALT 46 07/16/2025     (H) 07/16/2025    ALKPHOS 150 07/16/2025    BILITOTAL 13.4 (H) 07/16/2025     OTHER:   Lab Results   Component Value Date    LACT 0.9 07/11/2025    A1C 5.9 (H) 07/09/2025    REX 9.3 07/16/2025    LIPASE 38 07/08/2025       Anesthesia Plan    ASA Status:  3, emergent       Anesthesia Type: General.  Airway: oral.  Induction: intravenous.  Maintenance: Inhalation.   Techniques and Equipment:       - Monitoring Plan: standard ASA monitoring     Consents    Anesthesia Plan(s) and associated risks, benefits, and realistic alternatives discussed. Questions answered and patient/representative(s) expressed understanding.     - Discussed: anesthesiologist     - Discussed with:  Patient               Postoperative Care    Pain management: multimodal analgesia.     Comments:                   Richard Resendez MD    I have reviewed the pertinent notes and labs in the chart from the past 30 days and (re)examined the patient.  Any updates or changes from those notes are reflected in this note.    Clinically Significant Risk Factors         # Hyponatremia: Lowest Na = 128 mmol/L in last 2 days, will monitor as appropriate  # Hypochloremia: Lowest Cl = 88 mmol/L in last 2 days, will monitor as appropriate      # Hypoalbuminemia: Lowest albumin = 3.1 g/dL at 7/11/2025  5:49 AM, will monitor as appropriate  # Coagulation Defect: INR = 1.95 (Ref range: 0.85 - 1.15) and/or PTT = N/A, will monitor for bleeding        "        # Obesity: Estimated body mass index is 33.31 kg/m  as calculated from the following:    Height as of this encounter: 1.829 m (6').    Weight as of this encounter: 111.4 kg (245 lb 9.5 oz).   # Moderate Malnutrition: based on nutrition assessment and treatment provided per dietitian's recommendations.

## 2025-07-16 NOTE — PROGRESS NOTES
[facility]    Medicine Progress Note - Hospitalist Service    Date of Admission:  7/8/2025    Assessment & Plan      Ricci Brown is a 35 year old male admitted on 7/8/2025. He has little past medical history who presents to the ED for evaluation of abdominal pain.   Terence stated that unfortunately he has been regular EtOH drinker at least for the last 10 to 15 years.      Problem list:    #Recurrent fever-no reported events overnight or fevers  #New leukocytosis-decreasing trend    -Fortunately no reported fever spikes earlier today and last night  -Procalcitonin level decreasing  -Cultures remain no growth today  -Remained on IV antibiotics  -Highly appreciate continuous input from ID service  -UA showing no obvious source of infection  -Repeat abdominal ultrasound done last 7/15/2025 and demonstrated no drainable ascites    #Nausea, vomiting  -Had a recurrent vomiting spells  #Abdominal distention  #Suspected ileus versus beginning constipation.  Versus possible small bowel obstruction    - Patient and nursing reported medium sized bowel movement earlier.  No reported bleeding tendencies  - Still showing distended abdomen  - No overt guarding or peritoneal signs  - Will send for CT scan minus contrast study given persistent nausea and vomiting, increasing abdominal distention  - Keep him on full liquids for now    #Thrombocytopenia  - Improving    #Bilateral knee pain  #Increasing generalized weakness  #Physical deconditioning    -Likely patient has underlying severe deconditioning from his concurrent illness  -However the degree of his muscle weakness is progressively worsening.  No obvious joint swelling seen on knees, wrists, shoulder and elbows area  -Will check CK levels  -Suspecting as an underlying myopathy here.  -If not improving then low threshold and asking for general neurology input    - Serum uric acid normal  - No ballotable swelling, not erythema on bilateral knees,  not warm to touch  - Reassuring bilateral knee x-ray  - He mentioned that he had some issues with bilateral knee pain in the past requiring for him to take daily NSAIDs.  Discussed with him that NSAIDs is not the best regimen for him as of now in the setting of underlying liver cirrhosis, recent ISABELLE, I disposition for bleeding tendencies.  - May utilize as needed APAP  - May utilize as needed oxycodone.  - Examination of other joints did not show any swelling, limitation range of motion.  No prior history of gouty arthritis.  - Avoiding corticosteroids if possible given underlying sepsis and ongoing infection and peritonitis  - PT and OT input    #Sepsis with tachycardia, fever spikes, lactic acidosis secondary to spontaneous bacterial peritonitis    - Tolerated diagnostic and therapeutic paracentesis last 7/10/2025  - Drained 660 mL of cloudy ascitic fluid  - Differential study showing significant neutrophil count  - Elevated procalcitonin  -Has a pending repeat procalcitonin just for monitoring purposes.  Anticipating improvement given concurrent antibiotics and improving clinical appearance  -GI requesting for repeat paracentesis  -Remain on IV antibiotics currently on ceftriaxone      #Acute kidney injury secondary to underlying IV contrast nephropathy  #Persistent hyperkalemia  #High suspicion for contrast IV nephropathy  #Doubtful for hepatorenal syndrome    - Highly appreciate input from nephrology service  -Hyperkalemia resolved  -Albumin level normal.  No hypotension.  -May do trial of diuresis the next couple of days if continues to demonstrate stable kidney function and electrolytes.  I believe patient recovered from recent ISABELLE with underlying contrast nephropathy.  -  -Avoid NSAIDs  - Avoid nephrotoxic agents  - Will await further instructions and recommendations from nephrology service      #Abdominal pain, jaundice  #Newly diagnosed possible liver cirrhosis  #Suspected cholecystitis, no clear evidence  of choledocholithiasis  #Possible alcoholic gastritis  #Alcohol abuse, alcohol dependence  .      Alcohol use  High risk for alcohol withdrawals  Reports drinking 3-4 hard liquor drinks each day. Denies ever going through withdrawal. Does have some tachycardia, may be related to above issues but will monitor on CIWA with symptom triggered benzos for now.   - CIWA monitoring with symptom triggered benzodiazepines  - Gabapentin taper  - Thiamine and folate replacement  Extensive discussion ensued regarding the need and importance of complete EtOH cessation in the setting of this findings and diagnosis of liver cirrhosis, hepatitis, jaundice and now with concomitant kidney injury          Diet: Full Liquid Diet    DVT Prophylaxis: Pneumatic Compression Devices and Ambulate every shift  Starr Catheter: Not present  Lines: None     Cardiac Monitoring: ACTIVE order. Indication: Tachyarrhythmias, acute (48 hours)  Code Status: Full Code      Clinically Significant Risk Factors         # Hyponatremia: Lowest Na = 128 mmol/L in last 2 days, will monitor as appropriate  # Hypochloremia: Lowest Cl = 88 mmol/L in last 2 days, will monitor as appropriate      # Hypoalbuminemia: Lowest albumin = 3.1 g/dL at 7/11/2025  5:49 AM, will monitor as appropriate  # Coagulation Defect: INR = 1.95 (Ref range: 0.85 - 1.15) and/or PTT = N/A, will monitor for bleeding                 # Obesity: Estimated body mass index is 33.31 kg/m  as calculated from the following:    Height as of this encounter: 1.829 m (6').    Weight as of this encounter: 111.4 kg (245 lb 9.5 oz).   # Moderate Malnutrition: based on nutrition assessment and treatment provided per dietitian's recommendations.           Social Drivers of Health     Received from [facility]    Social Connections          Disposition Plan     Medically Ready for Discharge: Anticipated in 5+ Days            Irvin Andrea MD, MD  Hospitalist Service  [facility]  Securely message with [employer] (more info)  Text page via [employer] Paging/Directory   ______________________________________________________________________    Interval History   Continuing medicine service care.    Seen and examined.  Case discussed with nursing service.   Family updated this patient's wife present at bedside  This gentleman had some several events overnight with vomiting spells earlier today.  No reported bleeding tendencies.  He had a medium size bowel movement earlier.  No reported bleeding tendencies.  Fortunately no recurrent fever spike this morning.  No reported alteration in mental state  He thinks he is a little bit improved with earlier generalized muscle weakness            Physical Exam   Vital Signs: Temp: 98.2  F (36.8  C) Temp src: Oral BP: (!) 147/72 Pulse: (!) 121   Resp: 24 SpO2: (!) 91 % O2 Device: None (Room air)    Weight: 245 lbs 9.48 oz    HEENT; Atraumatic, normocephalic, pinkish conjuctiva, pupils bilateral reactive   Icteric sclerae  Skin: warm and moist, no rashes  Jaundice  Lymphatics: no cervical or axillary lymphandenopathy  Lungs: equal chest expansion, clear to auscultation, no wheezes, no stridor, no crackles,   Heart:  tachycardic rate, normal rhythm, no rubs or gallops.   Abdomen: Positive bowel sounds, no ongoing tenderness upon palpation, distended, nontender upon palpation, tympanic  Extremities: no deformities,  bilateral lower extremity nonpitting edema   Neuro; follow commands, alert and oriented x3, spontaneous speech, coherent, moves all extremities spontaneously  Psych; no hallucination, euthymic mood, not agitated  Generalized muscle weakness, limited range of motion on bilateral knee area due to some discomfort    Medical Decision Making       50 MINUTES SPENT BY ME on the date of service doing chart review, history, exam, documentation & further activities per the note.  MANAGEMENT DISCUSSED with the following over the past  24 hours: yes   NOTE(S)/MEDICAL RECORDS REVIEWED over the past 24 hours: yes      Data     I have personally reviewed the following data over the past 24 hrs:    11.2 (H)  \   11.9 (L)   / 145 (L)     128 (L) 88 (L) 18.6 /  126 (H)   3.4 24 N/A \     ALT: 46 AST: 101 (H) AP: 150 TBILI: 13.4 (H)   ALB: 3.4 (L) TOT PROTEIN: 7.5 LIPASE: N/A     Procal: 1.30 (H) CRP: N/A Lactic Acid: N/A         Imaging results reviewed over the past 24 hrs:   Recent Results (from the past 24 hours)   US Paracentesis with Albumin    Narrative    EXAM: US PARACENTESIS WITH ALBUMIN  LOCATION: [facility]  DATE: 7/15/2025    INDICATION: Recent SBP, ascites, liver cirrhosis now with abdominal distention and recurrent fever.  COMPARISON: None.  TECHNIQUE: Complete abdominal ultrasound. Color flow with spectral Doppler and waveform analysis performed.    FINDINGS/    Impression    IMPRESSION: Limited abdominal ultrasound demonstrated no drainable ascites. No paracentesis was performed.

## 2025-07-16 NOTE — PROGRESS NOTES
I was notified by radiology service regarding results of CT scan of the abdomen pelvis minus contrast that we pursued given increasing abdominal distention, recurrent vomiting spells  Earlier I was notified by nursing service that he had some bowel movements but had recurrent vomiting spells.  Previously suspicion of ileus but reportedly had some numerous passing of flatus and able to tolerate liquid diet and eventual advancement of diet had some vomiting spells  -Now CT of the abdomen pelvis minus contrast showing presence of free air, consideration of  possible perforation, presence of dilated bowel loops  -Of note patient had a paracentesis but that was 8 days ago.  -Unsure if the free air related to paracentesis or underlying possible perforation with this bowel obstruction  -Requesting stat surgical evaluation and will await their recommendations  - Keep him n.p.o. with no exceptions  - Provide IV fluid support while n.p.o.  - We will await if NG decompression will be recommended by general surgery    Zully      Addendum: 4:50 PM  Spoke with bedside RN and provided her with updates regarding recent findings and plan of care  Spoke with patient's wife over the phone and updated her regarding finding of most recent CT scan, plan of care, request for general surgery evaluation  -Keep him n.p.o.  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

## 2025-07-16 NOTE — PROGRESS NOTES
Patient had 2 episodes of large-volume emesis over the last couple of hours.  Received IV Compazine.  He feels like his abdomen is more distended than earlier.  He was on full liquid diet previously and advance to regular diet yesterday.  Reviewed chart.  Possibly had ileus versus constipation and had bowel movements while here.  -Changed to full liquid diet for now, reassessment by daytime provider.  If further vomiting episodes, then make n.p.o.

## 2025-07-16 NOTE — PROGRESS NOTES
Had CT completed. Made NPO after talking to Dr. Andrea. Had lemon ice at 1445.   Patient going to surgery now. Bath completed. Emotional support provided. Transferred to the Preop area.   Wife went down with patient to the procedure area.

## 2025-07-16 NOTE — PLAN OF CARE
"Goal Outcome Evaluation:           Overall Patient Progress: no changeOverall Patient Progress: no change    Outcome Evaluation: iv rocephin, tap today 0 removed, jaundiced, joint pain with swelling, Tele Stach    No c/o pain. No toob, poor appetite. Abd distended firm, blood cultures pending.       Problem: Adult Inpatient Plan of Care  Goal: Plan of Care Review  Description: The Plan of Care Review/Shift note should be completed every shift.  The Outcome Evaluation is a brief statement about your assessment that the patient is improving, declining, or no change.  This information will be displayed automatically on your shift  note.  Outcome: Progressing  Flowsheets (Taken 7/15/2025 2316)  Outcome Evaluation: iv rocephin, tap today 0 removed, jaundiced, joint pain with swelling, Tele Stach  Overall Patient Progress: no change  Goal: Patient-Specific Goal (Individualized)  Description: You can add care plan individualizations to a care plan. Examples of Individualization might be:  \"Parent requests to be called daily at 9am for status\", \"I have a hard time hearing out of my right ear\", or \"Do not touch me to wake me up as it startles  me\".  Outcome: Progressing  Goal: Absence of Hospital-Acquired Illness or Injury  Outcome: Progressing  Intervention: Identify and Manage Fall Risk  Recent Flowsheet Documentation  Taken 7/15/2025 2058 by Hope Walls RN  Safety Promotion/Fall Prevention:   activity supervised   assistive device/personal items within reach   clutter free environment maintained   nonskid shoes/slippers when out of bed   patient and family education   safety round/check completed  Intervention: Prevent Skin Injury  Recent Flowsheet Documentation  Taken 7/15/2025 2058 by Hope Walls RN  Body Position: supine, head elevated  Intervention: Prevent and Manage VTE (Venous Thromboembolism) Risk  Recent Flowsheet Documentation  Taken 7/15/2025 2058 by Hope Walls RN  VTE " E-advice sent   Prevention/Management: patient refused intervention  Goal: Optimal Comfort and Wellbeing  Outcome: Progressing  Goal: Readiness for Transition of Care  Outcome: Progressing     Problem: Pain Acute  Goal: Optimal Pain Control and Function  Outcome: Progressing     Problem: Electrolyte Imbalance  Goal: Electrolyte Balance  Outcome: Progressing     Problem: Excessive Substance Use  Goal: Optimized Energy Level (Excessive Substance Use)  Outcome: Progressing  Goal: Improved Behavioral Control (Excessive Substance Use)  Outcome: Progressing  Goal: Increased Participation and Engagement (Excessive Substance Use)  Outcome: Progressing  Goal: Improved Physiologic Symptoms (Excessive Substance Use)  Outcome: Progressing  Goal: Enhanced Social, Occupational or Functional Skills (Excessive Substance Use)  Outcome: Progressing

## 2025-07-16 NOTE — PROGRESS NOTES
GASTROENTEROLOGY PROGRESS NOTE    CC: RUQ abdominal pain         Alcohol misuse          Liver cirrhosis with ascites     SUBJECTIVE:  had more vomiting this morning, now feeling ok                           Per RN, did have a loose BM this morning     OBJECTIVE:  General Appearance:  Resting in bed, NAD,abdomen quite distended as prior   BP (!) 147/72   Pulse (!) 121   Temp 98.2  F (36.8  C) (Oral)   Resp 24   Ht 1.829 m (6')   Wt 111.4 kg (245 lb 9.5 oz)   SpO2 (!) 91%   BMI 33.31 kg/m      Patient Vitals for the past 72 hrs:   Weight   07/15/25 0821 111.4 kg (245 lb 9.5 oz)       PHYSICAL EXAM    EYES: scleral icterus   RESPIRATORY: CTA  CARDIOVASCULAR: CTA  GASTROINTESTINAL: Distended, slightly firm, + BS  SKIN/HAIR/NAILS: jaundiced   NEUROLOGIC: grossly intact, no signs of HE    Additional Comments:  ROS, FH, SH: See initial GI consult for details.    I have reviewed the patient's new clinical lab results:    Recent Labs   Lab Test 07/16/25  0830 07/15/25  0853 07/14/25  0700 07/13/25  0740 07/12/25  0625   WBC 11.2* 12.1* 6.4 5.7 4.6   HGB 11.9* 12.2* 11.7* 13.0* 12.1*   MCV 99 100 102* 101* 101*   * 135* 90* 71* 58*   INR  --   --  1.95* 1.76* 1.69*     Recent Labs   Lab Test 07/16/25  0830 07/15/25  0622 07/14/25  0700   POTASSIUM 3.4 3.5 3.5   CHLORIDE 88* 90* 95*   CO2 24 23 25   BUN 18.6 14.2 14.1   ANIONGAP 16* 17* 14     Recent Labs   Lab Test 07/16/25  0830 07/15/25  1413 07/15/25  0622 07/14/25  0700 07/11/25  0549 07/10/25  1345 07/09/25  0811 07/08/25  1611 07/08/25  1512   ALBUMIN 3.4*  --  3.4* 3.3*   < >  --    < >  --  4.4   BILITOTAL 13.4*  --  12.5* 11.7*   < >  --    < >  --  5.9*   ALT 46  --  47 41   < >  --    < >  --  64   *  --  108* 105*   < >  --    < >  --  231*   PROTEIN  --  20*  --   --   --  30*  --  50*  --    LIPASE  --   --   --   --   --   --   --   --  38    < > = values in this interval not displayed.     Paracentesis 7/10/2025:    Latest Reference  Range & Units 07/10/25 12:14   Cell Count Fluid Source   Paracentesis   Total Nucleated Cells /uL 91,980   Absolute Neutrophils, Body Fluid /uL 89,220.6 (HH)   % Neutrophils Fluid % 97   % Lymphocytes Fluid % 0   % Mono/Macro Fluid % 3   Color Fluid Colorless, Yellow  Orange !   Appearance Fluid Clear  Cloudy !   Albumin Fluid Source   Paracentesis   Albumin Fluid g/dL 2.4        MELD 3.0: 26 at 7/16/2025  8:30 AM  MELD-Na: 29 at 7/16/2025  8:30 AM  Calculated from:  Serum Creatinine: 0.73 mg/dL (Using min of 1 mg/dL) at 7/15/2025  6:22 AM  Serum Sodium: 128 mmol/L at 7/16/2025  8:30 AM  Total Bilirubin: 13.4 mg/dL at 7/16/2025  8:30 AM  Serum Albumin: 3.4 g/dL at 7/16/2025  8:30 AM  INR(ratio): 1.95 at 7/14/2025  7:00 AM  Age at listing (hypothetical): 35 years  Sex: Male at 7/16/2025  8:30 AM          IMAGING   EXAM: CT ABDOMEN PELVIS W CONTRAST  LOCATION: [facility]  DATE: 7/8/2025     IMPRESSION:   1.  Cirrhotic morphology of the liver with evidence of portal hypertension and trace abdominopelvic free fluid. No definite drainable ascites at this time.  2.  Superimposed hepatic steatosis is nonspecific but given heterogeneity and subtle surrounding fat stranding, superimposed acute hepatitis is also possible.  3.  Likely cholelithiasis and/or sludge with some subtle fat stranding adjacent to gallbladder, could be related to #'s 1 and 2, but consider further evaluation with right upper quadrant ultrasound if there is clinical concern for acute cholecystitis. No   evidence of biliary obstruction.     EXAM: US ABDOMEN LIMITED  LOCATION: [facility]  DATE: 7/8/2025     IMPRESSION:  1.  Few small stones versus sludge in the gallbladder. No wall thickening or free fluid to suggest acute cholecystitis.     2.  Hepatic steatosis.     EXAM: US ABDOMEN LIMITED W ABDOMEN DOPPLER LIMITED  LOCATION: [facility]  DATE: 7/10/2025     IMPRESSION:  1.  Technically  challenging and limited exam.  2.  Hepatic steatosis.  3.  Nondiagnostic evaluation of the hepatic vasculature. The portal and hepatic veins were patent on 7/8/2025 CT. They also appeared patent on yesterday's MRI. If there is continued concern for portal and/or hepatic vein pathology, multiphasic CT would   be recommended.  No significant ascites, although a trace amount was present on yesterday's MRI.      HIDA 7/9:  FINDINGS: Normal radionuclide activity in liver, gallbladder, bile ducts, and small bowel. No evidence of cystic or common duct obstruction or intrinsic liver disease.                                                                  IMPRESSION:   Negative for acute cholecystitis.     MRCP 7/9:  1.  Cirrhosis with small volume ascites.  2.  No biliary dilatation or choledocholithiasis.  3.  Cholelithiasis.      ENDOSCOPIC EVALUATION     No prior endoscopic studies.      ASSESSMENT AND PLAN     Assessment:  35 year old male without significant PMH other than moderate alcohol use,  that presented on 7/8 with symptoms of RUQ abdominal pain.     Presenting labs showed AST of 231, ALT of 64, alk phos of 286, total bili of 5.9, normal lipase, and platelet level of 68.  Paracentesis performed.     Paracentesis with absolute neutrophil count of > 89,000.  Patient had an US on 7/25, which interestingly showed no fluid to tap.       Liver cirrhosis noted on imaging.  Hepatic panel AST> ALT, alcoholic pattern/alcoholic hepatitis. MRCP negative for biliary etiology of elevated LFTs.       Hepatitis B/C negative, iron studies negative for iron overload.      He continues to have emesis.  Likely 2/2 distention of abdomen, although there was no significant ascites noted on US yesterday amenable to removal      Plan:     -appreciate IM consulting ID-per their reqs, if spikes another temp, consider broadening Rocephin to Zosyn      -continue IV ceftriaxone, most recent US did not indicate large area of ascites that  could be tapped (if he is responsive to antibiotics, give 5 day course, then transition to Cipro 500 mg indefinitely for prophylaxis)     -consider initiation of diuretics when hyponatremia normalizes (additionally had ISABELLE, but Cr has now normalized, however now having emesis and may need more broad infectious w/o, would hold for now      -continue to ADAT to 2 G sodium restriction diet      -unable to give steroids for alcoholic hepatitis 2/2 SBP     -trend MELD     -agree with thiamine, folic acid, appreciate alcohol withdrawal management per primary team    -agree with scheduled bowel regime (MiraLax may be difficult for him to keep down, but consider senna, colace, or suppository)     -alcohol cessation      -will need follow up in hepatology with [facility]         20 minutes of total time was spent providing patient care, including patient evaluation, reviewing documentation/test results, and .     I will update Dr. Redding, GI staff  Laura Chan NP   [facility]  [phone]

## 2025-07-16 NOTE — CONSULTS
[facility]    Infectious Disease Consultation     Date of Admission:  7/8/2025  Date of Consult (When I saw the patient): 07/16/25    Assessment & Plan   Ricci Brown is a 35 year old male who was admitted on 7/8/2025.     Impression: 1  35-year-old male, alcoholic cirrhosis and distended abdomen, initial concern for possible abdominal infection started on antibiotics then had peritoneal tap with grossly abnormal fluid and 97,000 white cells confirming peritonitis, cultures were negative been on antibiotics prior, seemingly responding and doing well until now recurrent fever ongoing abdominal distention although curiously no further fluid to tap  2  alcoholic cirrhosis  3  initial ATN, resolved    REC 1  unclear situation, I favor no change currently await the new cultures, follow fever if he has persistent fever presumably repeat imaging, seems very distended yet curiously not able to tap any fluid, I suppose back to Zosyn for broader coverage of intra-abdominal infection if persistent fever        Parmjit Us MD    Reason for Consult   Reason for consult: I was asked to evaluate this patient for  fever.    Primary Care Physician   Physician No Ref-Primary    Chief Complaint    Abdominal pain and fever    History is obtained from the patient and medical records    History of Present Illness   Ricci Brown is a 35 year old male who presents with admission 1 week ago with abdominal pain and possible abdominal infection.  Initially started on antibiotics Zosyn then ceftriaxone.  Had a tap 2 days into the hospital stay on antibiotics which was grossly abnormal with 97,000 white cells.  Gram stain was negative and cultures remain negative.  Imaging does not suggest any primary peritoneal problem.  Patient seemingly was clinically improving at least with resolution of  fever and no leukocytosis.  Initial renal failure resolved.  Now recurrent fever, some chills further abdominal  distention.  Attempted further tap was unsuccessful.  White count slightly elevated with left shift as  well  no other localizing symptoms of note.  No particular significant travel or exposures.  No travel to tickborne endemic areas but a possible tick exposure    Past Medical History   I have reviewed this patient's medical history and updated it with pertinent information if needed.   No past medical history on file.    Past Surgical History   I have reviewed this patient's surgical history and updated it with pertinent information if needed.  No past surgical history on file.    Prior to Admission Medications   Prior to Admission Medications   Prescriptions Last Dose Informant Patient Reported? Taking?   MILK THISTLE PO 7/8/2025 Morning  Yes Yes   Sig: Take 1 tablet by mouth daily.   diclofenac (VOLTAREN) 75 MG EC tablet 7/8/2025 Morning  Yes Yes   Sig: Take 150 mg by mouth daily.   multivitamin, therapeutic (THERA-VIT) TABS tablet 7/8/2025 Morning  Yes Yes   Sig: Take 1 tablet by mouth daily.      Facility-Administered Medications: None     Allergies   No Known Allergies    Immunization History   Immunization History   Administered Date(s) Administered    COVID-19 MONOVALENT 12+ (Pfizer) 01/10/2022    COVID-19 Vaccine (Gigit) 05/05/2021       Social History   I have reviewed this patient's social history and updated it with pertinent information if needed. Ricci Brown      Family History   I have reviewed this patient's family history and updated it with pertinent information if needed.   No family history on file.    Review of Systems   The 10 point Review of Systems is negative    Physical Exam   Temp: 98.2  F (36.8  C) Temp src: Oral BP: (!) 147/72 Pulse: (!) 121   Resp: 24 SpO2: (!) 91 % O2 Device: None (Room air)    Vital Signs with Ranges  Temp:  [98.2  F (36.8  C)-102.3  F (39.1  C)] 98.2  F (36.8  C)  Pulse:  [116-123] 121  Resp:  [20-24] 24  BP: (137-147)/(71-84) 147/72  SpO2:  [91 %-94 %]  "91 %  245 lbs 9.48 oz  Body mass index is 33.31 kg/m .    GENERAL APPEARANCE:  awake  EYES: Eyes grossly normal to inspection  NECK: no adenopathy  RESP: lungs clear   CV: regular rates and rhythm  LYMPHATICS: normal ant/post cervical and supraclavicular nodes  ABDOMEN:  moderately tender and very distended, difficult to believe there is not significant fluid present  MS: extremities normal  SKIN: no suspicious lesions or rashes        Data   All laboratory and imaging data in the past 24 hours reviewed  No results for input(s): \"CULT\" in the last 168 hours.  No lab results found.    Invalid input(s): \"UC\"       All cultures:  Recent Labs   Lab 07/15/25  0622 07/10/25  1214 07/10/25  [id number]   CULTURE No growth after 12 hours  No growth after 12 hours No anaerobic organisms isolated after 5 days  No Growth No Growth  No Growth      Blood culture:  Results for orders placed or performed during the hospital encounter of 07/08/25   Blood Culture Peripheral blood (BC) Hand, Right    Collection Time: 07/15/25  6:22 AM    Specimen: Hand, Right; Peripheral blood (BC)   Result Value Ref Range    Culture No growth after 12 hours    Blood Culture Peripheral blood (BC) Arm, Right    Collection Time: 07/15/25  6:22 AM    Specimen: Arm, Right; Peripheral blood (BC)   Result Value Ref Range    Culture No growth after 12 hours    Blood Culture Peripheral blood (BC) Arm, Right    Collection Time: 07/10/25  8:41 AM    Specimen: Arm, Right; Peripheral blood (BC)   Result Value Ref Range    Culture No Growth    Blood Culture Peripheral blood (BC) Arm, Right    Collection Time: 07/10/25  8:41 AM    Specimen: Arm, Right; Peripheral blood (BC)   Result Value Ref Range    Culture No Growth       Urine culture:  No results found for this or any previous visit.          "

## 2025-07-16 NOTE — CONSULTS
[facility]  General Surgery Consultation           Ricci Brown MRN# [id number]   YOB: 1990 Age: 35 year old      Date of Admission:  7/8/2025  Date of Consult: 7/16/2025     Reason for consult:            pneumoperitoneum     Requesting physician:            Irvin Andrea MD            Assessment and Plan:   Patient is a 35 year old male without medical history, admitted to the hospital 7/8/25 with abdominal pain and fever. 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 Has had significant jaundice since admission with otherwise negative hepatitis workup (MRCP also just showing cirrhosis and cholelithiasis, HIDA negative for cholecystitis). Initially diagnosed with SBP, CT on admission with small amount of ascites and no other abnormalities- paracentesis 7/10 with 660ml with high neutrophil without any bacterial growth. Attempt at repeat paracentesis 7/13 and 7/15 but no ascites fluid seen. In the last 2 days developed more abdominal distension, fevers and vomiting, this afternoon a repeat abdominal CT showed new free air and newly dilated small bowel with transition point consistent with small bowel obstruction. Suspect perforated small bowel.    PLAN:  OR for diagnostic laparoscopy, possible laparotomy and bowel resection.  Discussed in detail with patient and wife he is at very high risk for surgery due to existing liver cirrhosis, portal hypertension and coagulopathy with high risk for decompensated liver failure, hemorrhage, poor wound healing, DVT, respiratory failure, possible death and other complications  He is agreeable to transfusions if required.  Discussed if he develops fulminant liver failure could require transfer to high level of care.  Type and screen ordered stat.            Chief Complaint:     Chief Complaint   Patient presents with    Abdominal Pain            History of Present Illness:   Ricci Brown is a 35 year old male without medical history,  admitted to the hospital 7/8/25 with abdominal pain and fever. 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 Has had significant jaundice since admission with otherwise negative hepatitis workup (MRCP also just showing cirrhosis and cholelithiasis, HIDA negative for cholecystitis). Initially thought to have SBP, CT on admission with small amount of ascites and no other abnormalities- paracentesis 7/10 with 660ml with high neutrophil without any bacterial growth. Has been on ceftriaxone and did initially improve. Also Developed ISABELLE likely contrast nephropathy which also improved.  Attempt at repeat paracentesis 7/13 and 7/15 but no ascites fluid seen.   In the last 2 days reports developed more abdominal distension, fevers and vomiting, this afternoon a repeat abdominal CT showed new free air and newly dilated small bowel with transition point consistent with small bowel obstruction. He is still passing gas and stool as of today.         Past Medical History:   No past medical history on file.       Past Surgical History:   No past surgical history on file.       Current Medications:         Current Facility-Administered Medications   Medication Dose Route Frequency Provider Last Rate Last Admin    albumin human 25 % injection 12.5 g  12.5 g Intravenous Once Светлана Huntley APRN CNP        cefTRIAXone (ROCEPHIN) 2 g vial to attach to  ml bag for ADULTS or NS 50 ml bag for PEDS  2 g Intravenous Q24H Irvin Andrea MD   2 g at 07/16/25 0958    docusate sodium (COLACE) capsule 100 mg  100 mg Oral BID Irvin Andrea MD   100 mg at 07/16/25 1049    fexofenadine (ALLEGRA) tablet 60 mg  60 mg Oral Daily Irvin Andrea MD   60 mg at 07/16/25 1049    folic acid (FOLVITE) tablet 1 mg  1 mg Oral Daily Irvin Andrea MD   1 mg at 07/16/25 1049    gabapentin (NEURONTIN) capsule 100 mg  100 mg Oral Q8H Nabor Ludwig MD   100 mg at 07/16/25 1151    lidocaine 1 % 1-30 mL  1-30 mL Subcutaneous Once  Raquel Robertson,         LORazepam (ATIVAN) tablet 0.5 mg  0.5 mg Oral Once Irvin Andrea MD        multivitamin, therapeutic (THERA-VIT) tablet 1 tablet  1 tablet Oral Daily Irvin Andrea MD   1 tablet at 07/16/25 1053    pantoprazole (PROTONIX) EC tablet 40 mg  40 mg Oral BID AC Irvin Andrea MD   40 mg at 07/16/25 1554    polyethylene glycol (MIRALAX) Packet 17 g  17 g Oral Daily Irvin Andrea MD   17 g at 07/12/25 0853    sodium chloride (PF) 0.9% PF flush 3 mL  3 mL Intracatheter Q8H [place] Nabor Ludwig MD   3 mL at 07/16/25 0958    thiamine (B-1) tablet 100 mg  100 mg Oral Daily Irvin Andrea MD   100 mg at 07/16/25 1049     Current Facility-Administered Medications   Medication Dose Route Frequency Provider Last Rate Last Admin    acetaminophen (TYLENOL) tablet 650 mg  650 mg Oral Q6H PRN Irvin Andrea MD   650 mg at 07/16/25 1050    bisacodyl (DULCOLAX) suppository 10 mg  10 mg Rectal Daily PRN Irvin Andrea MD        calcium carbonate (TUMS) chewable tablet 1,000 mg  1,000 mg Oral 4x Daily PRN Nabor Ludwig MD   1,000 mg at 07/16/25 0717    cyclobenzaprine (FLEXERIL) tablet 5 mg  5 mg Oral Q8H PRN Irvin Andrea MD   5 mg at 07/14/25 1018    glucose gel 15-30 g  15-30 g Oral Q15 Min PRN Narciso Mckeon MD        Or    dextrose 50 % injection 25-50 mL  25-50 mL Intravenous Q15 Min PRN Narciso Mckeon MD        Or    glucagon injection 1 mg  1 mg Subcutaneous Q15 Min PRN Narciso Mckeon MD        diazepam (VALIUM) tablet 10 mg  10 mg Oral Q30 Min PRN Nabor Ludwig MD        Or    diazepam (VALIUM) injection 5-10 mg  5-10 mg Intravenous Q30 Min PRN Nabor Ludwig MD   5 mg at 07/13/25 1732    flumazenil (ROMAZICON) injection 0.2 mg  0.2 mg Intravenous q1 min prn Nabor Ludwig MD        OLANZapine zydis (zyPREXA) ODT tab 5-10 mg  5-10 mg Oral Q6H PRN Nabor Ludwig MD        Or    haloperidol lactate (HALDOL) injection 2.5-5 mg   2.5-5 mg Intravenous Q6H PRN Nabor Ludwig MD        HYDROmorphone (DILAUDID) injection 0.2 mg  0.2 mg Intravenous Q3H PRN Irvin Andrea MD   0.2 mg at 07/14/25 1022    lidocaine (LMX4) cream   Topical Q1H PRN Nabor Ludwig MD        lidocaine 1 % 0.1-1 mL  0.1-1 mL Other Q1H PRN Nabor Ludwig MD        naloxone (NARCAN) injection 0.2 mg  0.2 mg Intravenous Q2 Min PRN Nabor Ludwig MD        Or    naloxone (NARCAN) injection 0.4 mg  0.4 mg Intravenous Q2 Min PRN Nabor Ludwig MD        Or    naloxone (NARCAN) injection 0.2 mg  0.2 mg Intramuscular Q2 Min PRN Nabor Ludwig MD        Or    naloxone (NARCAN) injection 0.4 mg  0.4 mg Intramuscular Q2 Min PRN Nabor Ludwig MD        oxyCODONE (ROXICODONE) tablet 5 mg  5 mg Oral Q6H PRN Irvin Andrea MD   5 mg at 07/16/25 0515    oxyCODONE IR (ROXICODONE) half-tab 2.5 mg  2.5 mg Oral Q6H PRN Irvin Andrea MD        prochlorperazine (COMPAZINE) injection 10 mg  10 mg Intravenous Q6H PRN Irvin Andrea MD   10 mg at 07/16/25 0957    Or    prochlorperazine (COMPAZINE) tablet 10 mg  10 mg Oral Q6H PRN Irvin Andrea MD        Or    prochlorperazine (COMPAZINE) suppository 25 mg  25 mg Rectal Q12H PRN Irvin Andrea MD        senna-docusate (SENOKOT-S/PERICOLACE) 8.6-50 MG per tablet 1 tablet  1 tablet Oral BID PRN Nabor Ludwig MD        Or    senna-docusate (SENOKOT-S/PERICOLACE) 8.6-50 MG per tablet 2 tablet  2 tablet Oral BID PRN Nabor Ludwig MD   2 tablet at 07/10/25 0829    sodium chloride (PF) 0.9% PF flush 3 mL  3 mL Intracatheter q1 min prn Nabor Ludwig MD   3 mL at 07/16/25 8724          Home Medications:     Prior to Admission medications    Medication Sig Last Dose Taking? Auth Provider Long Term End Date   diclofenac (VOLTAREN) 75 MG EC tablet Take 150 mg by mouth daily. 7/8/2025 Morning Yes Unknown, Entered By History Yes    MILK THISTLE PO Take 1 tablet by mouth daily. 7/8/2025 Morning Yes Unknown, Entered  By History     multivitamin, therapeutic (THERA-VIT) TABS tablet Take 1 tablet by mouth daily. 7/8/2025 Morning Yes Unknown, Entered By History            Allergies:   No Known Allergies       Family History:   No family history on file.      Social History:   Ricci Brown  reports no smoking and heavy ETOH use for 10-15 years      Review of Systems:   The 10 point Review of Systems is negative other than noted in the HPI.          Physical Exam:   Blood pressure 132/82, pulse 111, temperature 98.1  F (36.7  C), temperature source Temporal, resp. rate 24, height 1.829 m (6'), weight 111.4 kg (245 lb 9.5 oz), SpO2 93%.  245 lbs 9.48 oz Body mass index is 33.31 kg/m .  General: Generally appears well.  Psych: Alert and Oriented.  Normal affect  Neurological: non-focal, moves extremities symmetrically, grossly normal strength and sensation  Eyes: Sclera clear  Respiratory:  no dyspnea on RA  Cardiovascular:  tachycardic  GI: Abdomen: very rounded/distended and tympanic with diffuse tenderness, no obvious peritonitis.  MSK: extremities without edema  Integumentary:  No rashes           Labs/Imaging   Labs: I have reviewed the encounter labs     Lab Results   Component Value Date    WBC 11.2 07/16/2025     Lab Results   Component Value Date    HGB 11.9 07/16/2025     Lab Results   Component Value Date     07/16/2025     Last Basic Metabolic Panel:  Lab Results   Component Value Date     07/16/2025      Lab Results   Component Value Date    POTASSIUM 3.4 07/16/2025     Lab Results   Component Value Date    CHLORIDE 88 07/16/2025     Lab Results   Component Value Date    REX 9.3 07/16/2025     Lab Results   Component Value Date    CO2 24 07/16/2025     Lab Results   Component Value Date    BUN 18.6 07/16/2025     Lab Results   Component Value Date    CR  07/16/2025      Comment:      Unsatisfactory specimen - icteric.     Lab Results   Component Value Date     07/16/2025     07/12/2025      Imaging Studies: I have personally reviewed the imaging  Results for orders placed or performed during the hospital encounter of 07/08/25   CT Abdomen Pelvis w Contrast    Narrative    EXAM: CT ABDOMEN PELVIS W CONTRAST  LOCATION: [facility]  DATE: 7/8/2025    INDICATION: Right upper quadrant, R mid abd pain and tenderness  COMPARISON: None.  TECHNIQUE: CT scan of the abdomen and pelvis was performed following injection of IV contrast. Multiplanar reformats were obtained. Dose reduction techniques were used.  CONTRAST: 100mL Isovue 370    FINDINGS:   LOWER CHEST: No focal airspace consolidation or pleural effusion.    HEPATOBILIARY: Nodular contour of liver with widening of the fissures. Diffuse hepatic steatosis. Possible cholelithiasis and/or sludge with some subtle surrounding fat stranding but no definite wall thickening. No evidence of mercedes biliary obstruction.    PANCREAS: Normal.    SPLEEN: Splenomegaly. No focal lesion visualized.    ADRENAL GLANDS: Normal.    KIDNEYS/BLADDER: No hydronephrosis. Urinary bladder is decompressed but otherwise unremarkable.    BOWEL: No obstruction or inflammatory change. Normal appendix.    LYMPH NODES: No suspicious lymphadenopathy. Small volume abdominopelvic free fluid without drainable ascites.    VASCULATURE: Normal.    PELVIC ORGANS: Normal.    MUSCULOSKELETAL: No acute bony abnormality.      Impression    IMPRESSION:   1.  Cirrhotic morphology of the liver with evidence of portal hypertension and trace abdominopelvic free fluid. No definite drainable ascites at this time.  2.  Superimposed hepatic steatosis is nonspecific but given heterogeneity and subtle surrounding fat stranding, superimposed acute hepatitis is also possible.  3.  Likely cholelithiasis and/or sludge with some subtle fat stranding adjacent to gallbladder, could be related to #'s 1 and 2, but consider further evaluation with right upper quadrant ultrasound if there is  "clinical concern for acute cholecystitis. No   evidence of biliary obstruction.   US Abdomen Limited    Narrative    EXAM: US ABDOMEN LIMITED  LOCATION: [facility]  DATE: 7/8/2025    INDICATION: Right upper quadrant pain.  COMPARISON: None.  TECHNIQUE: Limited abdominal ultrasound.    FINDINGS:    GALLBLADDER: Small amount of sludge in the dependent portion of the gallbladder.    BILE DUCTS: No biliary dilatation. The common duct measures 3 mm.    LIVER: Increased echogenicity from diffuse fatty infiltration. No focal mass. The portal vein is patent with flow in the normal direction.    RIGHT KIDNEY: No hydronephrosis.    PANCREAS: The visualized portions are normal.    No ascites.      Impression    IMPRESSION:  1.  Few small stones versus sludge in the gallbladder. No wall thickening or free fluid to suggest acute cholecystitis.    2.  Hepatic steatosis.       MR Abdomen MRCP w/o & w Contrast    Narrative    EXAM: MR ABDOMEN MRCP W/O and W CONTRAST  LOCATION: [facility]  DATE: 7/9/2025    INDICATION: RUQ pain, elevated liver enzymes  COMPARISON: CT abdomen and pelvis from 07/08/2025.  TECHNIQUE: Routine MR liver/pancreas protocol including axial and coronal MRCP sequences. 2D and 3D reconstruction performed by MR technologist including MIP reconstruction and slab cholangiograms. If performed with contrast, additional dynamic T1 post   IV contrast images.  CONTRAST: 11 mL Gadavist     FINDINGS:     MRCP: Normal common bile duct caliber (5 mm) with no intrahepatic bile duct dilatation or \"filling defect\" to suggest choledocholithiasis. Nondilated main pancreatic duct. Cholelithiasis in a mildly distended gallbladder without evidence of gallbladder   wall thickening or significant focal pericholecystic edema.    LIVER: Moderate degree of diffuse hepatic steatosis with hepatomegaly (24 cm craniocaudal) and cirrhotic morphology given subtle nodular hepatic contour, widened " hepatic fissure, and enlarged caudate left lateral lobes. No discrete liver lesion. Normal   hepatic and portal veins. No intrahepatic bile duct dilatation. Small volume perihepatic predominant ascites.    PANCREAS: Normal.    ADDITIONAL FINDINGS: Splenomegaly. Normal adrenal glands and kidneys.      Impression    IMPRESSION:  1.  Cirrhosis with small volume ascites.  2.  No biliary dilatation or choledocholithiasis.  3.  Cholelithiasis.   NM Hepatobiliary Scan with GB EF and/or Pharm    Narrative    EXAM: NM HEPATOBILIARY SCAN WITH GB EF AND/OR PHARM  LOCATION: [facility]  DATE: 7/9/2025    INDICATION: Right upper quadrant abdominal pain  COMPARISON: MRCP dated 7/9/2025.  TECHNIQUE: 5.0 mCi of technetium-99m mebrofenin, IV. Anterior planar imaging of the abdomen. 2 mg of morphine, IV. Gallbladder imaging for 30-60 minutes.    FINDINGS: Normal radionuclide activity in liver, gallbladder, bile ducts, and small bowel. No evidence of cystic or common duct obstruction or intrinsic liver disease.       Impression    IMPRESSION:     Negative for acute cholecystitis.   US Abdomen Limited w Abdomen Doppler Limited    Narrative    EXAM: US ABDOMEN LIMITED W ABDOMEN DOPPLER LIMITED  LOCATION: [facility]  DATE: 7/10/2025    INDICATION: Elevated LFTs.  COMPARISON: Abdominal MRI 7/9/2025. Abdominal ultrasound and CT 7/8/2025  TECHNIQUE: Limited abdominal ultrasound. Color flow with spectral Doppler and waveform analysis performed.     FINDINGS: Technically challenging exam due to body habitus and bowel gas.    GALLBLADDER: Limited gallbladder visualization. The gallbladder sludge and stones seen on comparison MRI are not well visualized on this exam. No significant gallbladder thickening. The ultrasonographer reports a negative sonographic Vidales sign.     BILE DUCTS: The extra hepatic duct is obscured by bowel gas.    LIVER: Increased echogenicity from diffuse hepatic steatosis. No  definite focal mass within the limitations of this exam.     RIGHT KIDNEY: No hydronephrosis.     PANCREAS: The pancreas is largely obscured by overlying gas.    No significant ascites, although a trace amount was present on yesterday's MRI.    ABDOMINAL DUPLEX: This exam is nondiagnostic for evaluation of the hepatic vasculature due to bowel gas and difficulty penetrating the liver due to the degree of steatosis.      Impression    IMPRESSION:  1.  Technically challenging and limited exam.  2.  Hepatic steatosis.  3.  Nondiagnostic evaluation of the hepatic vasculature. The portal and hepatic veins were patent on 7/8/2025 CT. They also appeared patent on yesterday's MRI. If there is continued concern for portal and/or hepatic vein pathology, multiphasic CT would   be recommended.       US Paracentesis without Albumin    Narrative    EXAM:  1. PARACENTESIS  2. ULTRASOUND GUIDANCE  LOCATION: [facility]  DATE: 7/10/2025    INDICATION: Ascites.    PROCEDURE: Informed consent obtained. Time out performed. The abdomen was prepped and draped in a sterile fashion. 10 mL of 1% lidocaine was infused into local soft tissues. A 5 [place] catheter system was introduced into the abdominal ascites under   ultrasound guidance.    660 liters of cloudy pinkish fluid were removed and sent to lab if requested.    Patient tolerated procedure well.    Ultrasound imaging was obtained and placed in the patient's permanent medical record.      Impression    IMPRESSION:  1.  Status post ultrasound-guided paracentesis.               US Paracentesis with Albumin    Narrative    EXAM:  LIMITED ABDOMINAL ULTRASOUND:    LOCATION: [facility]  DATE: 7/13/2025    INDICATION: Ascites.    PROCEDURE: The patient presented for a paracentesis however, no significant fluid was identified. Therefore, paracentesis not performed.   XR Knee Port Bilateral 1/2 Views    Narrative    EXAM: XR KNEE PORT BILATERAL 1/2  VIEWS  LOCATION: [facility]  DATE: 7/13/2025    INDICATION: bilateral knee pain, swelling  COMPARISON: None.      Impression    IMPRESSION:   RIGHT KNEE: Normal joint spaces and alignment. No fracture or joint effusion.    LEFT KNEE: Normal joint spaces and alignment. No fracture or joint effusion.   XR Abdomen Port 1 View    Narrative    EXAM: XR ABDOMEN PORT 1 VIEW  LOCATION: [facility]  DATE: 7/13/2025    INDICATION: Vomiting, peritonitis, cirrhosis  COMPARISON: 07/08/2025      Impression    IMPRESSION: Mild gastric distention. Dilated loops of small bowel. Small bowel obstruction not excluded radiographically. Paucity of colonic gas.   US Paracentesis with Albumin    Narrative    EXAM: US PARACENTESIS WITH ALBUMIN  LOCATION: [facility]  DATE: 7/15/2025    INDICATION: Recent SBP, ascites, liver cirrhosis now with abdominal distention and recurrent fever.  COMPARISON: None.  TECHNIQUE: Complete abdominal ultrasound. Color flow with spectral Doppler and waveform analysis performed.    FINDINGS/    Impression    IMPRESSION: Limited abdominal ultrasound demonstrated no drainable ascites. No paracentesis was performed.   CT Abdomen Pelvis w/o Contrast    Narrative    EXAM: CT ABDOMEN PELVIS W/O CONTRAST  LOCATION: [facility]  DATE: 7/16/2025    INDICATION: Increasing abdominal distention, recurrent nausea and vomiting, here for liver cirrhosis, complications with peritonitis recently had a contrast IV nephropathy  COMPARISON: MRI 7/9/2025, CT 7/8/2025  TECHNIQUE: CT scan of the abdomen and pelvis was performed without IV contrast. Multiplanar reformats were obtained. Dose reduction techniques were used.  CONTRAST: None.    FINDINGS:   LOWER CHEST: Trace bilateral pleural fluid collections. Cardiac enlargement. Minimal bilateral atelectasis, greater on the right.    HEPATOBILIARY: Hepatomegaly. Marked diffuse hepatic steatosis.  Surface contour nodularity to liver compatible with underlying chronic hepatic parenchymal disease. High density material within the gallbladder. No biliary dilatation.    PANCREAS: No ductal dilatation or overt inflammatory change.    SPLEEN: Unchanged splenic enlargement.    ADRENAL GLANDS: Unremarkable.    KIDNEYS/BLADDER: No urinary collecting system dilatation or obstructing calculi. Noncontrast bladder and kidneys unremarkable.    BOWEL: Moderate fluid distention of the stomach and duodenum. Marked fluid dilatation of the proximal small bowel. Suspected possible transition point in the central mesentery where loops of small bowel appear slightly tethered with the loop extending   into the deep central mesentery (series 3, images 132-172. Decompressed distal small bowel, concerning for high-grade obstruction. Additionally, the markedly dilated loops are somewhat tethered anteriorly with mesenteric edema. Internal hernia cannot be   excluded. Decompressed colon. No evidence for pneumatosis. Multiple locules of free air anterior upper abdomen. Bowel perforation cannot be completely excluded. Bowel wall thickening and ischemia is a difficult to evaluate due to noncontrast study.    LYMPH NODES: No lymphadenopathy.    VASCULATURE: Normal caliber aorta.    PELVIC ORGANS: Minimal ascites deep within the pelvis, not significantly changed since prior study. Minimal mesenteric edema.    MUSCULOSKELETAL: No significant osseous abnormality.      Impression    IMPRESSION:   1.  Multiple small locules of free air in the abdomen and pelvis. This can be seen with bowel perforation given the dilatation subsequently described. Another consideration in this patient specifically is at the patient has had a recent paracentesis   (7/10/2025), air may have been introduced by paracentesis but this is less likely given the volume of air in the remote timeframe. No evidence for pneumatosis. Evaluation for ischemia is limited due to  lack of IV contrast material. Recommend surgical   consultation.    2.  Moderate fluid distention of the stomach and duodenum with marked fluid distention of the proximal small bowel. Suspected point of transition in the central mesentery where there is a loop of small bowel extending into the central mesentery appears   slightly tethered. Dilated loops appear coalesced with mesenteric edema. High-grade bowel obstruction related to internal hernia or adhesion suspected. Surgical consultation recommended.    3.  Minimal ascites deep within the pelvis with mesenteric edema.    4.  Marked diffuse hepatic steatosis with hepatomegaly and surface contour nodularity compatible with underlying chronic hepatic parenchymal disease.    5.  Unchanged splenomegaly.    Results discussed with Dr. Longo at 4:10 PM 7/16/2025.                    Gini Cantu MD  7/16/2025 4:55 PM

## 2025-07-16 NOTE — ANESTHESIA PROCEDURE NOTES
Airway       Patient location during procedure: OR       Procedure Start/Stop Times: 7/16/2025 6:33 PM  Staff -        CRNA: Richard Farr APRN CRNA       Performed By: CRNAIndications and Patient Condition       Indications for airway management: kandice-procedural and airway protection       Induction type:intravenous       Mask difficulty assessment: 1 - vent by mask    Final Airway Details       Final airway type: endotracheal airway       Successful airway: ETT - single  Endotracheal Airway Details        ETT size (mm): 8.0       Cuffed: yes       Successful intubation technique: video laryngoscopy       VL Blade Size: Glidescope 3       Grade View of Cords: 1       Adjucts: stylet       Position: Right       Measured from: gums/teeth       Secured at (cm): 23       Bite block used: None    Post intubation assessment        Placement verified by: capnometry, equal breath sounds and chest rise        Number of attempts at approach: 1       Secured with: tape       Ease of procedure: easy       Dentition: Intact    Medication(s) Administered   Medication Administration Time: 7/16/2025 6:33 PM

## 2025-07-16 NOTE — PLAN OF CARE
"Emesis this AM. Green bile in color. Distended firm abd. Hypoactive BS. BM loose medium.  Wife at bedside.  Pleasant. Oriented x4. Jaundice. Poor po intake. Mobility is self limiting with his stamina and weakness. Sat at edge of bed. Stood with PT.   Joint pain continues. Tylenol.   Resting between cares with eyes closed. Encouraged out of bed and IS, C & DB.   CT today.   Patient vital signs are at baseline: No,  Reason:  ST. Tachycardia. Afebrile today.   Patient able to ambulate as they were prior to admission or with assist devices provided by therapies during their stay:  No,  Reason:  2 assist to chair. Sat at edge of bed.   Patient MUST void prior to discharge:  Yes. Dark ismael urine.   Patient able to tolerate oral intake:  No,  Reason:  poor po intake. Emesis today. Compazine.   Pain has adequate pain control using Oral analgesics:  Yes. Increased pain with increased mobility. Sat on edge of bed.   Does patient have an identified :  Yes. Wife involved in care    Has goal D/C date and time been discussed with patient:  No,  Reason:  to be determined. Remains on rocephin. CT to be completed.   Reviewed plan of care. Bed alarm on for safety.    Problem: Adult Inpatient Plan of Care  Goal: Plan of Care Review  Description: The Plan of Care Review/Shift note should be completed every shift.  The Outcome Evaluation is a brief statement about your assessment that the patient is improving, declining, or no change.  This information will be displayed automatically on your shift  note.  Outcome: Not Progressing  Flowsheets (Taken 7/16/2025 1410)  Plan of Care Reviewed With: patient  Overall Patient Progress: no change  Goal: Patient-Specific Goal (Individualized)  Description: You can add care plan individualizations to a care plan. Examples of Individualization might be:  \"Parent requests to be called daily at 9am for status\", \"I have a hard time hearing out of my right ear\", or \"Do not touch me to wake me up " "as it startles  me\".  Outcome: Not Progressing  Goal: Absence of Hospital-Acquired Illness or Injury  Outcome: Not Progressing  Intervention: Identify and Manage Fall Risk  Recent Flowsheet Documentation  Taken 7/16/2025 0930 by Denise Cardona, RN  Safety Promotion/Fall Prevention:   activity supervised   assistive device/personal items within reach   supervised activity   room organization consistent   room near nurse's station  Intervention: Prevent Skin Injury  Recent Flowsheet Documentation  Taken 7/16/2025 0930 by Denise Cardona RN  Body Position: (refused repo) --  Intervention: Prevent and Manage VTE (Venous Thromboembolism) Risk  Recent Flowsheet Documentation  Taken 7/16/2025 0930 by Denise Cardona, RN  VTE Prevention/Management: patient refused intervention  Intervention: Prevent Infection  Recent Flowsheet Documentation  Taken 7/16/2025 0930 by Denise Cardona, RN  Infection Prevention:   single patient room provided   rest/sleep promoted   hand hygiene promoted  Goal: Optimal Comfort and Wellbeing  Outcome: Not Progressing  Goal: Readiness for Transition of Care  Outcome: Not Progressing     Problem: Pain Acute  Goal: Optimal Pain Control and Function  Outcome: Not Progressing  Intervention: Prevent or Manage Pain  Recent Flowsheet Documentation  Taken 7/16/2025 0930 by Denise Cardona, RN  Medication Review/Management: medications reviewed     Problem: Electrolyte Imbalance  Goal: Electrolyte Balance  Outcome: Not Progressing     Problem: Excessive Substance Use  Goal: Optimized Energy Level (Excessive Substance Use)  Outcome: Not Progressing  Goal: Improved Behavioral Control (Excessive Substance Use)  Outcome: Not Progressing  Goal: Increased Participation and Engagement (Excessive Substance Use)  Outcome: Not Progressing  Goal: Improved Physiologic Symptoms (Excessive Substance Use)  Outcome: Not Progressing  Goal: Enhanced Social, Occupational or Functional " Skills (Excessive Substance Use)  Outcome: Not Progressing   Goal Outcome Evaluation:      Plan of Care Reviewed With: patient    Overall Patient Progress: no changeOverall Patient Progress: no change

## 2025-07-16 NOTE — PLAN OF CARE
"Goal Outcome Evaluation:      Plan of Care Reviewed With: patient    Overall Patient Progress: no changeOverall Patient Progress: no change       A&O. RA. Tachycardic. Afebrile. Ax2 w/ Lift. Refused repo and OOB overnight. Emesis x3. Diet changed to full liquids. Voiding. Pt reports abd pain. Discharge plans TBD.           Problem: Adult Inpatient Plan of Care  Goal: Plan of Care Review  Description: The Plan of Care Review/Shift note should be completed every shift.  The Outcome Evaluation is a brief statement about your assessment that the patient is improving, declining, or no change.  This information will be displayed automatically on your shift  note.  Outcome: Progressing  Flowsheets (Taken 7/16/2025 0700)  Plan of Care Reviewed With: patient  Overall Patient Progress: no change  Goal: Patient-Specific Goal (Individualized)  Description: You can add care plan individualizations to a care plan. Examples of Individualization might be:  \"Parent requests to be called daily at 9am for status\", \"I have a hard time hearing out of my right ear\", or \"Do not touch me to wake me up as it startles  me\".  Outcome: Progressing  Goal: Absence of Hospital-Acquired Illness or Injury  Outcome: Progressing  Intervention: Identify and Manage Fall Risk  Recent Flowsheet Documentation  Taken 7/15/2025 2352 by Rosette Ott, RN  Safety Promotion/Fall Prevention:   activity supervised   assistive device/personal items within reach   supervised activity   room organization consistent   room near nurse's station  Intervention: Prevent Skin Injury  Recent Flowsheet Documentation  Taken 7/15/2025 2352 by Rosette Ott, RN  Body Position: (refused repo) --  Intervention: Prevent and Manage VTE (Venous Thromboembolism) Risk  Recent Flowsheet Documentation  Taken 7/15/2025 2352 by Rosette Ott, RN  VTE Prevention/Management: patient refused intervention  Intervention: Prevent Infection  Recent " Flowsheet Documentation  Taken 7/15/2025 2352 by Rosette Ott RN  Infection Prevention:   single patient room provided   rest/sleep promoted   hand hygiene promoted  Goal: Optimal Comfort and Wellbeing  Outcome: Progressing  Intervention: Monitor Pain and Promote Comfort  Recent Flowsheet Documentation  Taken 7/16/2025 0516 by Rosette Ott RN  Pain Management Interventions: medication (see MAR)  Goal: Readiness for Transition of Care  Outcome: Progressing     Problem: Pain Acute  Goal: Optimal Pain Control and Function  Outcome: Progressing  Intervention: Develop Pain Management Plan  Recent Flowsheet Documentation  Taken 7/16/2025 0516 by Rosette Ott RN  Pain Management Interventions: medication (see MAR)  Intervention: Prevent or Manage Pain  Recent Flowsheet Documentation  Taken 7/15/2025 2352 by Rosette Ott RN  Medication Review/Management: medications reviewed     Problem: Electrolyte Imbalance  Goal: Electrolyte Balance  Outcome: Progressing     Problem: Excessive Substance Use  Goal: Optimized Energy Level (Excessive Substance Use)  Outcome: Progressing  Goal: Improved Behavioral Control (Excessive Substance Use)  Outcome: Progressing  Goal: Increased Participation and Engagement (Excessive Substance Use)  Outcome: Progressing  Goal: Improved Physiologic Symptoms (Excessive Substance Use)  Outcome: Progressing  Goal: Enhanced Social, Occupational or Functional Skills (Excessive Substance Use)  Outcome: Progressing

## 2025-07-16 NOTE — PROGRESS NOTES
[facility]  Infectious Disease Progress Note          Assessment and Plan:   Date of Admission:  7/8/2025  Date of Consult (When I saw the patient): 07/16/25     Assessment & Plan  Ricci Brown is a 35 year old male who was admitted on 7/8/2025.      Impression: 1  35-year-old male, alcoholic cirrhosis and distended abdomen, initial concern for possible abdominal infection started on antibiotics then had peritoneal tap with grossly abnormal fluid and 97,000 white cells confirming peritonitis, cultures were negative been on antibiotics prior, seemingly responding and doing well until now recurrent fever ongoing abdominal distention although curiously no further fluid to tap  2  alcoholic cirrhosis  3  initial ATN, resolved     REC 1 Still  unclear situation, but of note feels better, temp down, repeat cultures negative, no fluid to tap despite significantly distended abdomen  2 fever continue to watch treating with ceftriaxone as though treating SBP that is improving despite the fever spike yesterday, if recurrent fever consideration of larger workup, repeat imaging possibly expanding back to Zosyn etc.  3 still a lot of pain in the  left knee this has been a chronic and subacute problem with workup negative previously but some thought of gout, also has some symptoms in his left hand where there are some slight erythematous areas but does not look like infection                 Interval History:     no new complaints including no fever, feels better, cultures so far negative, white count still slightly elevated, no fluid to tap in the abdomen.  Left knee and left hand still somewhat painful              Medications:     Current Facility-Administered Medications   Medication Dose Route Frequency Provider Last Rate Last Admin    albumin human 25 % injection 12.5 g  12.5 g Intravenous Once Светлана Huntley APRN CNP        cefTRIAXone (ROCEPHIN) 2 g vial to attach to  ml bag for  ADULTS or NS 50 ml bag for PEDS  2 g Intravenous Q24H Irvin Andrea MD   2 g at 07/16/25 0958    docusate sodium (COLACE) capsule 100 mg  100 mg Oral BID Irvin Andrea MD   100 mg at 07/16/25 1049    fexofenadine (ALLEGRA) tablet 60 mg  60 mg Oral Daily Irvin Andrea MD   60 mg at 07/16/25 1049    folic acid (FOLVITE) tablet 1 mg  1 mg Oral Daily Irvin Andrea MD   1 mg at 07/16/25 1049    gabapentin (NEURONTIN) capsule 100 mg  100 mg Oral Q8H Nabor Ludwig MD        lidocaine 1 % 1-30 mL  1-30 mL Subcutaneous Once Raquel Robertson DO        LORazepam (ATIVAN) tablet 0.5 mg  0.5 mg Oral Once Irvin Andrea MD        multivitamin, therapeutic (THERA-VIT) tablet 1 tablet  1 tablet Oral Daily Irvin Andrea MD   1 tablet at 07/16/25 1053    pantoprazole (PROTONIX) EC tablet 40 mg  40 mg Oral BID AC Irvin Andrea MD   40 mg at 07/16/25 0636    polyethylene glycol (MIRALAX) Packet 17 g  17 g Oral Daily Irvin Andrea MD   17 g at 07/12/25 0853    sodium chloride (PF) 0.9% PF flush 3 mL  3 mL Intracatheter Q8H [place] Nabor Ludwig MD   3 mL at 07/16/25 0958    thiamine (B-1) tablet 100 mg  100 mg Oral Daily Irvin Andrea MD   100 mg at 07/16/25 1049                  Physical Exam:   Blood pressure (!) 147/72, pulse (!) 121, temperature 98.2  F (36.8  C), temperature source Oral, resp. rate 24, height 1.829 m (6'), weight 111.4 kg (245 lb 9.5 oz), SpO2 (!) 91%.  Wt Readings from Last 2 Encounters:   07/15/25 111.4 kg (245 lb 9.5 oz)   10/28/24 103.4 kg (228 lb)     Vital Signs with Ranges  Temp:  [98.2  F (36.8  C)-100.9  F (38.3  C)] 98.2  F (36.8  C)  Pulse:  [116-123] 121  Resp:  [20-24] 24  BP: (137-147)/(71-84) 147/72  SpO2:  [91 %-94 %] 91 %    Constitutional: Awake, alert, cooperative, no apparent distress     Lungs: Clear to auscultation bilaterally, no crackles or wheezing   Cardiovascular: Regular rate and rhythm, normal S1 and S2,  "and no murmur noted   Abdomen: Very distended mildly tender but less than yesterday, hard to believe no fluid to tap   Skin: No rashes, no cyanosis, no edema   Other: Left hand can move the joints okay slight erythema present left knee is swollen apparently has chronically been that way, mildly tender decreased range of motion overall would doubt infection given the chronicity but certainly abnormal looking          Data:   All microbiology laboratory data reviewed.  Recent Labs   Lab Test 07/16/25  0830 07/15/25  0853 07/14/25  0700   WBC 11.2* 12.1* 6.4   HGB 11.9* 12.2* 11.7*   HCT 33.3* 34.9* 33.6*   MCV 99 100 102*   * 135* 90*     Recent Labs   Lab Test 07/15/25  0622 07/14/25  0700 07/13/25  0740   CR 0.73 0.63* 0.72     No lab results found.  No lab results found.    Invalid input(s): \"UC\"     "

## 2025-07-17 ENCOUNTER — APPOINTMENT (OUTPATIENT)
Dept: PHYSICAL THERAPY | Facility: CLINIC | Age: 35
End: 2025-07-17
Payer: COMMERCIAL

## 2025-07-17 ENCOUNTER — APPOINTMENT (OUTPATIENT)
Dept: OCCUPATIONAL THERAPY | Facility: CLINIC | Age: 35
End: 2025-07-17
Attending: INTERNAL MEDICINE
Payer: COMMERCIAL

## 2025-07-17 LAB
ALBUMIN SERPL BCG-MCNC: 3.1 G/DL (ref 3.5–5.2)
ALP SERPL-CCNC: 150 U/L (ref 40–150)
ALT SERPL W P-5'-P-CCNC: 46 U/L (ref 0–70)
ANION GAP SERPL CALCULATED.3IONS-SCNC: 15 MMOL/L (ref 7–15)
AST SERPL W P-5'-P-CCNC: 100 U/L (ref 0–45)
AST SERPL W P-5'-P-CCNC: ABNORMAL U/L
BACTERIA FLD CULT: NORMAL
BACTERIA SPEC CULT: NORMAL
BACTERIA SPEC CULT: NORMAL
BASOPHILS # BLD AUTO: 0 10E3/UL (ref 0–0.2)
BASOPHILS NFR BLD AUTO: 0 %
BILIRUB SERPL-MCNC: 12.2 MG/DL
BUN SERPL-MCNC: 19.4 MG/DL (ref 6–20)
CALCIUM SERPL-MCNC: 9.2 MG/DL (ref 8.8–10.4)
CHLORIDE SERPL-SCNC: 90 MMOL/L (ref 98–107)
CREAT SERPL-MCNC: 0.49 MG/DL (ref 0.67–1.17)
EGFRCR SERPLBLD CKD-EPI 2021: >90 ML/MIN/1.73M2
EOSINOPHIL # BLD AUTO: 0 10E3/UL (ref 0–0.7)
EOSINOPHIL NFR BLD AUTO: 0 %
ERYTHROCYTE [DISTWIDTH] IN BLOOD BY AUTOMATED COUNT: 13.4 % (ref 10–15)
GLUCOSE SERPL-MCNC: 171 MG/DL (ref 70–99)
GLUCOSE SERPL-MCNC: 171 MG/DL (ref 70–99)
HCO3 SERPL-SCNC: 24 MMOL/L (ref 22–29)
HCT VFR BLD AUTO: 33.6 % (ref 40–53)
HGB BLD-MCNC: 11.5 G/DL (ref 13.3–17.7)
IMM GRANULOCYTES # BLD: 0.1 10E3/UL
IMM GRANULOCYTES NFR BLD: 2 %
LYMPHOCYTES # BLD AUTO: 0.3 10E3/UL (ref 0.8–5.3)
LYMPHOCYTES NFR BLD AUTO: 4 %
MCH RBC QN AUTO: 34.4 PG (ref 26.5–33)
MCHC RBC AUTO-ENTMCNC: 34.2 G/DL (ref 31.5–36.5)
MCV RBC AUTO: 101 FL (ref 78–100)
MONOCYTES # BLD AUTO: 0.1 10E3/UL (ref 0–1.3)
MONOCYTES NFR BLD AUTO: 1 %
NEUTROPHILS # BLD AUTO: 7.8 10E3/UL (ref 1.6–8.3)
NEUTROPHILS NFR BLD AUTO: 93 %
NRBC # BLD AUTO: 0 10E3/UL
NRBC BLD AUTO-RTO: 0 /100
PLATELET # BLD AUTO: 173 10E3/UL (ref 150–450)
POTASSIUM SERPL-SCNC: 4.3 MMOL/L (ref 3.4–5.3)
PROT SERPL-MCNC: 7.5 G/DL (ref 6.4–8.3)
RBC # BLD AUTO: 3.34 10E6/UL (ref 4.4–5.9)
SODIUM SERPL-SCNC: 129 MMOL/L (ref 135–145)
WBC # BLD AUTO: 8.4 10E3/UL (ref 4–11)

## 2025-07-17 PROCEDURE — 120N000001 HC R&B MED SURG/OB

## 2025-07-17 PROCEDURE — 36415 COLL VENOUS BLD VENIPUNCTURE: CPT | Performed by: INTERNAL MEDICINE

## 2025-07-17 PROCEDURE — 84155 ASSAY OF PROTEIN SERUM: CPT | Performed by: SURGERY

## 2025-07-17 PROCEDURE — 250N000011 HC RX IP 250 OP 636: Performed by: INTERNAL MEDICINE

## 2025-07-17 PROCEDURE — 97530 THERAPEUTIC ACTIVITIES: CPT | Mod: GP | Performed by: PHYSICAL THERAPIST

## 2025-07-17 PROCEDURE — 250N000013 HC RX MED GY IP 250 OP 250 PS 637: Performed by: SURGERY

## 2025-07-17 PROCEDURE — 97165 OT EVAL LOW COMPLEX 30 MIN: CPT | Mod: GO

## 2025-07-17 PROCEDURE — 80048 BASIC METABOLIC PNL TOTAL CA: CPT | Performed by: INTERNAL MEDICINE

## 2025-07-17 PROCEDURE — 36415 COLL VENOUS BLD VENIPUNCTURE: CPT | Performed by: SURGERY

## 2025-07-17 PROCEDURE — 258N000003 HC RX IP 258 OP 636: Performed by: SURGERY

## 2025-07-17 PROCEDURE — 85004 AUTOMATED DIFF WBC COUNT: CPT | Performed by: SURGERY

## 2025-07-17 PROCEDURE — 97535 SELF CARE MNGMENT TRAINING: CPT | Mod: GO

## 2025-07-17 PROCEDURE — 250N000011 HC RX IP 250 OP 636: Performed by: SURGERY

## 2025-07-17 RX ADMIN — FOLIC ACID 1 MG: 1 TABLET ORAL at 20:30

## 2025-07-17 RX ADMIN — GABAPENTIN 100 MG: 100 CAPSULE ORAL at 23:21

## 2025-07-17 RX ADMIN — GABAPENTIN 100 MG: 100 CAPSULE ORAL at 05:12

## 2025-07-17 RX ADMIN — THIAMINE HCL TAB 100 MG 100 MG: 100 TAB at 20:30

## 2025-07-17 RX ADMIN — SODIUM CHLORIDE: 0.9 INJECTION, SOLUTION INTRAVENOUS at 22:45

## 2025-07-17 RX ADMIN — FAMOTIDINE 20 MG: 10 INJECTION, SOLUTION INTRAVENOUS at 20:30

## 2025-07-17 RX ADMIN — FEXOFENADINE HYDROCHLORIDE 60 MG: 60 TABLET ORAL at 10:06

## 2025-07-17 RX ADMIN — DOCUSATE SODIUM 100 MG: 100 CAPSULE, LIQUID FILLED ORAL at 20:30

## 2025-07-17 RX ADMIN — MULTIVITAMIN TABLET 1 TABLET: TABLET at 10:06

## 2025-07-17 RX ADMIN — PANTOPRAZOLE SODIUM 40 MG: 40 TABLET, DELAYED RELEASE ORAL at 05:31

## 2025-07-17 RX ADMIN — SODIUM CHLORIDE: 0.9 INJECTION, SOLUTION INTRAVENOUS at 09:59

## 2025-07-17 RX ADMIN — DOCUSATE SODIUM 100 MG: 100 CAPSULE, LIQUID FILLED ORAL at 10:06

## 2025-07-17 RX ADMIN — ACETAMINOPHEN 650 MG: 325 TABLET ORAL at 21:24

## 2025-07-17 RX ADMIN — ACETAMINOPHEN 650 MG: 325 TABLET ORAL at 10:06

## 2025-07-17 RX ADMIN — FAMOTIDINE 20 MG: 10 INJECTION, SOLUTION INTRAVENOUS at 10:01

## 2025-07-17 RX ADMIN — CEFTRIAXONE 2 G: 2 INJECTION, POWDER, FOR SOLUTION INTRAMUSCULAR; INTRAVENOUS at 07:53

## 2025-07-17 ASSESSMENT — ACTIVITIES OF DAILY LIVING (ADL)
ADLS_ACUITY_SCORE: 40
ADLS_ACUITY_SCORE: 37
ADLS_ACUITY_SCORE: 39
ADLS_ACUITY_SCORE: 39
ADLS_ACUITY_SCORE: 40
ADLS_ACUITY_SCORE: 39
ADLS_ACUITY_SCORE: 39
ADLS_ACUITY_SCORE: 41
ADLS_ACUITY_SCORE: 40
ADLS_ACUITY_SCORE: 39
ADLS_ACUITY_SCORE: 40
ADLS_ACUITY_SCORE: 39
ADLS_ACUITY_SCORE: 39
ADLS_ACUITY_SCORE: 37
ADLS_ACUITY_SCORE: 39
ADLS_ACUITY_SCORE: 40
ADLS_ACUITY_SCORE: 39
ADLS_ACUITY_SCORE: 41
ADLS_ACUITY_SCORE: 41
ADLS_ACUITY_SCORE: 39
ADLS_ACUITY_SCORE: 40
ADLS_ACUITY_SCORE: 37
ADLS_ACUITY_SCORE: 39

## 2025-07-17 NOTE — PLAN OF CARE
"NG had 850 ml output (Green bile in color. Paused suctioning for 4 hours. Denies nausea. Green bile in color. Distended but softer abd. Hypoactive BS. Skin and eye color is yellow. Was up in the chair. Step-mom at bedside. Productive cough, crackles. Trace edema.Pleasant. Oriented x4. Jaundice. NPO, except for meds. Mobility is improving, was able to ambulate with assist of 1 in the room. Was up in the chair. Family is at bedside.   Mild joint pain remains.       Patient vital signs are at baseline: No,  Reason:  BP is elevated. Afebrile today.   Patient able to ambulate as they were prior to admission or with assist devices provided by therapies during their stay:  No,  Reason:  assist of 1 while ambulating in the room.   Patient MUST void prior to discharge:  Yes, urinal.   Patient able to tolerate oral intake:  No,  Reason:  NPO, except for meds.   Pain has adequate pain control using Oral analgesics:  Yes. Increased pain with increased mobility.   Does patient have an identified :  Yes. Wife and step-mom involved in care  Has goal D/C date and time been discussed with patient:  No,  Reason:  to be determined. Remains on rocephin.   NG clamped [id number].   Mobility improved today.   1705 NG removed 150cc residual after clamped for 4 hours. Tolerated well.    Problem: Adult Inpatient Plan of Care  Goal: Plan of Care Review  Description: The Plan of Care Review/Shift note should be completed every shift.  The Outcome Evaluation is a brief statement about your assessment that the patient is improving, declining, or no change.  This information will be displayed automatically on your shift  note.  Outcome: Progressing  Flowsheets (Taken 7/17/2025 1432)  Plan of Care Reviewed With:   patient   spouse   step-parent(s)  Goal: Patient-Specific Goal (Individualized)  Description: You can add care plan individualizations to a care plan. Examples of Individualization might be:  \"Parent requests to be called daily at " "9am for status\", \"I have a hard time hearing out of my right ear\", or \"Do not touch me to wake me up as it startles  me\".  Outcome: Progressing  Goal: Absence of Hospital-Acquired Illness or Injury  Outcome: Progressing  Goal: Optimal Comfort and Wellbeing  Outcome: Progressing  Goal: Readiness for Transition of Care  Outcome: Progressing     Problem: Pain Acute  Goal: Optimal Pain Control and Function  Outcome: Progressing     Problem: Electrolyte Imbalance  Goal: Electrolyte Balance  Outcome: Progressing     Problem: Excessive Substance Use  Goal: Optimized Energy Level (Excessive Substance Use)  Outcome: Progressing  Goal: Improved Behavioral Control (Excessive Substance Use)  Outcome: Progressing  Goal: Increased Participation and Engagement (Excessive Substance Use)  Outcome: Progressing  Goal: Improved Physiologic Symptoms (Excessive Substance Use)  Outcome: Progressing  Goal: Enhanced Social, Occupational or Functional Skills (Excessive Substance Use)  Outcome: Progressing   Goal Outcome Evaluation:      Plan of Care Reviewed With: patient, spouse, step-parent(s)                     "

## 2025-07-17 NOTE — OP NOTE
[facility]   Operative Note    Pre-operative diagnosis: Pneumoperitoneum [K66.8]   Post-operative diagnosis Same, no findings   Procedure: Procedure(s):  LAPAROSCOPY, DIAGNOSTIC   Surgeon(s): Surgeons and Role:     * Gini Cantu MD - Primary     * Kyle Thorne PA-C - Assisting  The Physician Assistant was medically necessary for their expertise in camera work, prepping, suctioning, suturing and retraction.   Estimated blood loss: 5ml    Specimens: * No specimens in log *   Findings: Dilated proximal small bowel with gradual transition to decompressed small bowel. No adhesions, no perforation. No succus or stool. no perforation of the stomach or duodenum. Normal appearing gallbladder with cirrhotic liver.   Suspect pneumoperitoneum was from prior paracentesis 1 week prior and patient has developed ileus from SBP     Indication for procedure:  This is a 34yo M admitted to the hospital 7/8/25 with abdominal pain and fever. 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 Has had significant jaundice since admission with otherwise negative hepatitis workup (MRCP also just showing cirrhosis and cholelithiasis, HIDA negative for cholecystitis). Initially diagnosed with SBP, CT on admission with small amount of ascites and no other abnormalities- paracentesis 7/10 with 660ml with high neutrophil without any bacterial growth. Attempt at repeat paracentesis 7/13 and 7/15 but no ascites fluid seen. In the last 2 days developed more abdominal distension, fevers and vomiting, this afternoon a repeat abdominal CT showed new free air and newly dilated small bowel with transition point concerning for high-grade obstruction with the markedly dilated loops are somewhat tethered anteriorly with mesenteric edema and internal hernia cannot be excluded. We discussed exploration in the operating room to look for hollow viscous perforation and he agreed after hearing risks and benefit. Discussed in  detail with patient and wife he is at very high risk for surgery due to existing liver cirrhosis, portal hypertension and coagulopathy with high risk for decompensated liver failure, hemorrhage, poor wound healing, DVT, respiratory failure, possible death and other complications. He is agreeable to transfusions if required. He signed informed consent.    Description of procedure:   Patient was brought to the operating room, placed on the operating table in supine position. Anesthesia was induced. His pressure points were padded and he  was secured with a safety strap. The site was prepped and draped in the standard sterile fashion. A timeout was called to verify the patient, site of procedure and procedure to be performed.    Access to the abdomen was gained at Olson's point with a 5mm optiview trocar with a zero degree scope under direct visualization. Pneumoperitoneum was established. The abdomen was surveyed. Additional 5mm ports were placed in the left abdomen and eventually the epigastrum to aid in retraction for a total of 4 ports.     We began by surveying the anterior stomach and duodenum. These were normal. There was no exudates or succus seen in the abdomen. The omentum was swept superiorly with the patient in Trendelenburg and the distal decompressed small bowel was located. This was run proximally until we encountered gradually more dilated small bowel. This was run as proximally as possible to the left upper quadrant and was all quite dilated but there were no exudates or inflamed appearing areas. The visible colon appeared healthy. The ports were removed under direct visualization then reinserted to ensure no bleeding.    Skin was closed with interrupted 4-0 vicryl.   Incisions were washed and dressed with dermabond.    Patient was awoken and transferred to PACU in stable condition.  Sponge and needle counts were correct prior to completion of procedure.      Gini Cantu MD

## 2025-07-17 NOTE — ANESTHESIA POSTPROCEDURE EVALUATION
Patient: Ricci Brown    Procedure: Procedure(s):  LAPAROSCOPY, DIAGNOSTIC       Anesthesia Type:  General    Note:     Postop Pain Control: Uneventful            Sign Out: Well controlled pain   PONV: No   Neuro/Psych: Uneventful            Sign Out: Acceptable/Baseline neuro status   Airway/Respiratory: Uneventful            Sign Out: Acceptable/Baseline resp. status   CV/Hemodynamics: Uneventful            Sign Out: Acceptable CV status; No obvious hypovolemia; No obvious fluid overload   Other NRE:    DID A NON-ROUTINE EVENT OCCUR? No           Last vitals:  Vitals Value Taken Time   /84 07/16/25 20:00   Temp 99.3  F (37.4  C) 07/16/25 19:50   Pulse 107 07/16/25 20:09   Resp 18 07/16/25 20:09   SpO2 94 % 07/16/25 20:09   Vitals shown include unfiled device data.    Electronically Signed By: Aileen Solano MD, MD  July 16, 2025  8:10 PM

## 2025-07-17 NOTE — ANESTHESIA CARE TRANSFER NOTE
Patient: Ricci Brown    Procedure: Procedure(s):  LAPAROSCOPY, DIAGNOSTIC       Diagnosis: Pneumoperitoneum [K66.8]  Diagnosis Additional Information: No value filed.    Anesthesia Type:   General     Note:    Oropharynx: oral airway in place and spontaneously breathing  Level of Consciousness: drowsy  Oxygen Supplementation: face mask  Level of Supplemental Oxygen (L/min / FiO2): 10  Independent Airway: airway patency satisfactory and stable  Dentition: dentition unchanged  Vital Signs Stable: post-procedure vital signs reviewed and stable  Report to RN Given: handoff report given  Patient transferred to: PACU    Handoff Report: Identifed the Patient, Identified the Reponsible Provider, Reviewed the pertinent medical history, Discussed the surgical course, Reviewed Intra-OP anesthesia mangement and issues during anesthesia, Set expectations for post-procedure period and Allowed opportunity for questions and acknowledgement of understanding      Vitals:  Vitals Value Taken Time   /69 07/16/25 19:30   Temp     Pulse 103 07/16/25 19:33   Resp 18 07/16/25 19:33   SpO2 92 % 07/16/25 19:33   Vitals shown include unfiled device data.    Electronically Signed By: DAVID Viveros CRNA  July 16, 2025  7:34 PM

## 2025-07-17 NOTE — PROGRESS NOTES
[facility]  Infectious Disease Progress Note          Assessment and Plan:   Date of Admission:  7/8/2025  Date of Consult (When I saw the patient): 07/16/25     Assessment & Plan  Ricci Brown is a 35 year old male who was admitted on 7/8/2025.      Impression: 1  35-year-old male, alcoholic cirrhosis and distended abdomen, initial concern for possible abdominal infection started on antibiotics then had peritoneal tap with grossly abnormal fluid and 97,000 white cells confirming peritonitis, cultures were negative been on antibiotics prior, seemingly responding and doing well until now recurrent fever ongoing abdominal distention although curiously no further fluid to tap  2  alcoholic cirrhosis  3  initial ATN, resolved     REC 1 CT showed free air and now has had surgical intervention, not that much inflammation and no clear abdominal issue found except for ileus and bowel dysfunction  2 temp down feels relatively okay postop, plan to keep continuing it as SBP with ceftriaxone for now                 Interval History:     Operation noted, no clear cause of pneumoperitoneum found probably related to the taps, not that much inflammation seen nothing to culture left knee and left hand still somewhat painful but if anything somewhat improved              Medications:     Current Facility-Administered Medications   Medication Dose Route Frequency Provider Last Rate Last Admin    albumin human 25 % injection 12.5 g  12.5 g Intravenous Once Gini Cantu MD        cefTRIAXone (ROCEPHIN) 2 g vial to attach to  ml bag for ADULTS or NS 50 ml bag for PEDS  2 g Intravenous Q24H Gini Cantu MD   2 g at 07/17/25 0753    docusate sodium (COLACE) capsule 100 mg  100 mg Oral BID Gini Cantu MD   100 mg at 07/17/25 1006    famotidine (PEPCID) injection 20 mg  20 mg Intravenous Q12H Irvin Andrea MD   20 mg at 07/17/25 1001    fexofenadine (ALLEGRA) tablet 60 mg  60 mg  Oral Daily Gini Cantu MD   60 mg at 07/17/25 1006    folic acid (FOLVITE) tablet 1 mg  1 mg Oral Daily Gini Cantu MD   1 mg at 07/16/25 1049    gabapentin (NEURONTIN) capsule 100 mg  100 mg Oral Q8H Gini aCntu MD   100 mg at 07/17/25 0512    lidocaine 1 % 1-30 mL  1-30 mL Subcutaneous Once Gini Cantu MD        LORazepam (ATIVAN) tablet 0.5 mg  0.5 mg Oral Once Gini Cantu MD        multivitamin, therapeutic (THERA-VIT) tablet 1 tablet  1 tablet Oral Daily Gini Cantu MD   1 tablet at 07/17/25 1006    polyethylene glycol (MIRALAX) Packet 17 g  17 g Oral Daily Gini Cantu MD   17 g at 07/12/25 0853    sodium chloride (PF) 0.9% PF flush 3 mL  3 mL Intracatheter Q8H [place] Gini Cantu MD   3 mL at 07/16/25 0958    thiamine (B-1) tablet 100 mg  100 mg Oral Daily Gini Cantu MD   100 mg at 07/16/25 1049                  Physical Exam:   Blood pressure (!) 151/79, pulse 106, temperature 98  F (36.7  C), temperature source Temporal, resp. rate 14, height 1.829 m (6'), weight 111.4 kg (245 lb 9.5 oz), SpO2 92%.  Wt Readings from Last 2 Encounters:   07/15/25 111.4 kg (245 lb 9.5 oz)   10/28/24 103.4 kg (228 lb)     Vital Signs with Ranges  Temp:  [97  F (36.1  C)-99.3  F (37.4  C)] 98  F (36.7  C)  Pulse:  [] 106  Resp:  [12-25] 14  BP: (119-151)/() 151/79  SpO2:  [90 %-98 %] 92 %    Constitutional: Awake, alert, cooperative, no apparent distress     Lungs: Clear to auscultation bilaterally, no crackles or wheezing   Cardiovascular: Regular rate and rhythm, normal S1 and S2, and no murmur noted   Abdomen: Very distended mildly tender but less than yesterday, hard to believe no fluid to tap   Skin: No rashes, no cyanosis, no edema   Other: Left hand can move the joints okay slight erythema present left knee is swollen apparently has chronically been that way, mildly tender decreased range of motion overall would doubt infection given the chronicity but  "certainly abnormal looking          Data:   All microbiology laboratory data reviewed.  Recent Labs   Lab Test 07/17/25  0746 07/16/25  0830 07/15/25  0853   WBC 8.4 11.2* 12.1*   HGB 11.5* 11.9* 12.2*   HCT 33.6* 33.3* 34.9*   * 99 100    145* 135*     Recent Labs   Lab Test 07/17/25  0746 07/15/25  0622 07/14/25  0700   CR 0.49* 0.73 0.63*     No lab results found.  No lab results found.    Invalid input(s): \"UC\"     "

## 2025-07-17 NOTE — PROGRESS NOTES
[facility]    Medicine Progress Note - Hospitalist Service    Date of Admission:  7/8/2025    Assessment & Plan      Ricci Brown is a 35 year old male admitted on 7/8/2025. He has little past medical history who presents to the ED for evaluation of abdominal pain.   Terence stated that unfortunately he has been regular EtOH drinker at least for the last 10 to 15 years.      Problem list:    #Increasing abdominal distention  #Pneumoperitoneum suspected might be related to earlier paracentesis  #Ileus likely secondary to SBP  #Postoperative state status post diagnostic laparoscopy    -Currently has NG tube decompression  - Plans for clamping trial  - Ambulate as much as he can tolerate  - Highly appreciate extensive and prompt input from general surgery service  - Will await further recommendations regarding resumption of diet once patient is demonstrating resumption of bowel function  - Fortunately he is not complaining of any worsening abdominal discomfort or pain  - Indwelling Starr catheter removed earlier.  Will await regarding spontaneous voiding    #Recurrent fever-no reported events overnight or fevers  # Resolved leukocytosis    -Fortunately no reported fever spikes earlier today and last night  -Procalcitonin level decreasing  -Cultures remain no growth   -Remained on IV antibiotics currently receiving ceftriaxone  -Highly appreciate continuous input from ID service  -UA showing no obvious source of infection  -Repeat abdominal ultrasound done last 7/15/2025 and demonstrated no drainable ascites  - Hyperbilirubinemia improving    #Nausea, vomiting  -Had a recurrent vomiting spells  #Abdominal distention  #Suspected ileus versus beginning constipation.  Versus possible small bowel obstruction    - See above discussion regarding updates and diagnosis revealed after CT of the abdomen pelvis that was done last 7/16/2025    #Thrombocytopenia-resolved  -    #Bilateral knee  pain  #Increasing generalized weakness  #Physical deconditioning    -Likely patient has underlying severe deconditioning from his concurrent illness  -However the degree of his muscle weakness is progressively worsening.  No obvious joint swelling seen on knees, wrists, shoulder and elbows area  -CK levels normal  -Suspecting as an underlying myopathy here.  -If not improving then low threshold and asking for general neurology input    - Serum uric acid normal  - No ballotable swelling, not erythema on bilateral knees, not warm to touch  - Reassuring bilateral knee x-ray  - He mentioned that he had some issues with bilateral knee pain in the past requiring for him to take daily NSAIDs.  Discussed with him that NSAIDs is not the best regimen for him as of now in the setting of underlying liver cirrhosis, recent ISABELLE, I disposition for bleeding tendencies.  - May utilize as needed APAP  - May utilize as needed oxycodone.  - Examination of other joints did not show any swelling, limitation range of motion.  No prior history of gouty arthritis.  - Avoiding corticosteroids if possible given underlying sepsis and ongoing infection and peritonitis  - PT and OT input  - Will need continuation of aggressive out of bed activities    #Sepsis with tachycardia, fever spikes, lactic acidosis secondary to spontaneous bacterial peritonitis    - Tolerated diagnostic and therapeutic paracentesis last 7/10/2025  - Drained 660 mL of cloudy ascitic fluid  - Differential study showing significant neutrophil count  - Elevated procalcitonin  -Has a pending repeat procalcitonin just for monitoring purposes.  Anticipating improvement given concurrent antibiotics and improving clinical appearance  -GI requesting for repeat paracentesis  -Remain on IV antibiotics currently on ceftriaxone      #Acute kidney injury secondary to underlying IV contrast nephropathy  #Persistent hyperkalemia  #High suspicion for contrast IV nephropathy  #Doubtful for  hepatorenal syndrome    - Highly appreciate input from nephrology service  -Hyperkalemia resolved  -Albumin level normal.  No hypotension.  -May do trial of diuresis the next couple of days if continues to demonstrate stable kidney function and electrolytes.  I believe patient recovered from recent ISABELLE with underlying contrast nephropathy.  -  -Avoid NSAIDs  - Avoid nephrotoxic agents  - Will await further instructions and recommendations from nephrology service      #Abdominal pain, jaundice  #Newly diagnosed possible liver cirrhosis  #Suspected cholecystitis, no clear evidence of choledocholithiasis  #Possible alcoholic gastritis  #Alcohol abuse, alcohol dependence  .      Alcohol use  High risk for alcohol withdrawals  Reports drinking 3-4 hard liquor drinks each day. Denies ever going through withdrawal. Does have some tachycardia, may be related to above issues but will monitor on CIWA with symptom triggered benzos for now.   - CIWA monitoring with symptom triggered benzodiazepines  - Gabapentin taper  - Thiamine and folate replacement  Extensive discussion ensued regarding the need and importance of complete EtOH cessation in the setting of this findings and diagnosis of liver cirrhosis, hepatitis, jaundice and now with concomitant kidney injury          Diet: NPO for Medical/Clinical Reasons Except for: Meds    DVT Prophylaxis: Pneumatic Compression Devices and Ambulate every shift  Starr Catheter: Not present  Lines: None     Cardiac Monitoring: None  Code Status: Full Code      Clinically Significant Risk Factors         # Hyponatremia: Lowest Na = 128 mmol/L in last 2 days, will monitor as appropriate  # Hypochloremia: Lowest Cl = 88 mmol/L in last 2 days, will monitor as appropriate      # Hypoalbuminemia: Lowest albumin = 3.1 g/dL at 7/17/2025  7:46 AM, will monitor as appropriate                  # Obesity: Estimated body mass index is 33.31 kg/m  as calculated from the following:    Height as of this  encounter: 1.829 m (6').    Weight as of this encounter: 111.4 kg (245 lb 9.5 oz).   # Moderate Malnutrition: based on nutrition assessment and treatment provided per dietitian's recommendations.           Social Drivers of Health     Received from [employer] [facility]    Social Connections          Disposition Plan     Medically Ready for Discharge: Anticipated in 5+ Days            Irvin Andrea MD, MD  Hospitalist Service  [facility]  Securely message with [employer] (more info)  Text page via [employer] Paging/Directory   ______________________________________________________________________    Interval History   Continuing medicine service care.    Seen and examined.  Case discussed with nursing service.   Family updated this patient's wife present at bedside  Fortunately Terence appears to be in better spirits this morning.  He remained afebrile this morning.  Decreasing tachycardia.  More interactive and cooperative.  He mentioned that he is having improvement with earlier abdominal distention and discomfort.  He thinks he is somewhat a little bit better with earlier joint weakness and pain.  No alteration in mental state.  No bleeding tendencies.  He tolerated emergent surgery last night and no clear evidence of perforation seen with findings of pneumoperitoneum  Currently tolerating nasogastric decompression  No alteration in mental state  No reported bleeding tendencies          Physical Exam   Vital Signs: Temp: 98  F (36.7  C) Temp src: Temporal BP: (!) 151/79 Pulse: 106   Resp: 14 SpO2: 92 % O2 Device: Nasal cannula Oxygen Delivery: 1 LPM  Weight: 245 lbs 9.48 oz    HEENT; Atraumatic, normocephalic, pinkish conjuctiva, pupils bilateral reactive   NGT present  Icteric sclerae  Skin: warm and moist, no rashes  Jaundice  Lymphatics: no cervical or axillary lymphandenopathy  Lungs: equal chest expansion, clear to auscultation, no wheezes, no stridor, no crackles,    Heart:  tachycardic rate, normal rhythm, no rubs or gallops.   Abdomen: Positive bowel sounds, no ongoing tenderness upon palpation, distended, nontender upon palpation, tympanic  Extremities: no deformities,  bilateral lower extremity nonpitting edema   Neuro; follow commands, alert and oriented x3, spontaneous speech, coherent, moves all extremities spontaneously  Psych; no hallucination, euthymic mood, not agitated  Generalized muscle weakness, limited range of motion on bilateral knee area due to some discomfort    Medical Decision Making       50 MINUTES SPENT BY ME on the date of service doing chart review, history, exam, documentation & further activities per the note.  MANAGEMENT DISCUSSED with the following over the past 24 hours: Yes   NOTE(S)/MEDICAL RECORDS REVIEWED over the past 24 hours: Yes      Data     I have personally reviewed the following data over the past 24 hrs:    8.4  \   11.5 (L)   / 173     129 (L) 90 (L) 19.4 /  171 (H); 171 (H)   4.3 24 0.49 (L) \     ALT: 46 AST: 100 (H) AP: 150 TBILI: 12.2 (H)   ALB: 3.1 (L) TOT PROTEIN: 7.5 LIPASE: N/A       Imaging results reviewed over the past 24 hrs:   Recent Results (from the past 24 hours)   CT Abdomen Pelvis w/o Contrast    Narrative    EXAM: CT ABDOMEN PELVIS W/O CONTRAST  LOCATION: [facility]  DATE: 7/16/2025    INDICATION: Increasing abdominal distention, recurrent nausea and vomiting, here for liver cirrhosis, complications with peritonitis recently had a contrast IV nephropathy  COMPARISON: MRI 7/9/2025, CT 7/8/2025  TECHNIQUE: CT scan of the abdomen and pelvis was performed without IV contrast. Multiplanar reformats were obtained. Dose reduction techniques were used.  CONTRAST: None.    FINDINGS:   LOWER CHEST: Trace bilateral pleural fluid collections. Cardiac enlargement. Minimal bilateral atelectasis, greater on the right.    HEPATOBILIARY: Hepatomegaly. Marked diffuse hepatic steatosis. Surface contour  nodularity to liver compatible with underlying chronic hepatic parenchymal disease. High density material within the gallbladder. No biliary dilatation.    PANCREAS: No ductal dilatation or overt inflammatory change.    SPLEEN: Unchanged splenic enlargement.    ADRENAL GLANDS: Unremarkable.    KIDNEYS/BLADDER: No urinary collecting system dilatation or obstructing calculi. Noncontrast bladder and kidneys unremarkable.    BOWEL: Moderate fluid distention of the stomach and duodenum. Marked fluid dilatation of the proximal small bowel. Suspected possible transition point in the central mesentery where loops of small bowel appear slightly tethered with the loop extending   into the deep central mesentery (series 3, images 132-172. Decompressed distal small bowel, concerning for high-grade obstruction. Additionally, the markedly dilated loops are somewhat tethered anteriorly with mesenteric edema. Internal hernia cannot be   excluded. Decompressed colon. No evidence for pneumatosis. Multiple locules of free air anterior upper abdomen. Bowel perforation cannot be completely excluded. Bowel wall thickening and ischemia is a difficult to evaluate due to noncontrast study.    LYMPH NODES: No lymphadenopathy.    VASCULATURE: Normal caliber aorta.    PELVIC ORGANS: Minimal ascites deep within the pelvis, not significantly changed since prior study. Minimal mesenteric edema.    MUSCULOSKELETAL: No significant osseous abnormality.      Impression    IMPRESSION:   1.  Multiple small locules of free air in the abdomen and pelvis. This can be seen with bowel perforation given the dilatation subsequently described. Another consideration in this patient specifically is at the patient has had a recent paracentesis   (7/10/2025), air may have been introduced by paracentesis but this is less likely given the volume of air in the remote timeframe. No evidence for pneumatosis. Evaluation for ischemia is limited due to lack of IV contrast  material. Recommend surgical   consultation.    2.  Moderate fluid distention of the stomach and duodenum with marked fluid distention of the proximal small bowel. Suspected point of transition in the central mesentery where there is a loop of small bowel extending into the central mesentery appears   slightly tethered. Dilated loops appear coalesced with mesenteric edema. High-grade bowel obstruction related to internal hernia or adhesion suspected. Surgical consultation recommended.    3.  Minimal ascites deep within the pelvis with mesenteric edema.    4.  Marked diffuse hepatic steatosis with hepatomegaly and surface contour nodularity compatible with underlying chronic hepatic parenchymal disease.    5.  Unchanged splenomegaly.    Results discussed with Dr. Longo at 4:10 PM 7/16/2025.

## 2025-07-17 NOTE — PLAN OF CARE
"Goal Outcome Evaluation:      Plan of Care Reviewed With: patient, parent    Overall Patient Progress: no changeOverall Patient Progress: no change         Arrived from PACU  Minimal pain overnight. Removed lion cath this AM at 0500 due to void. NG tube in place to low intermittent suction per order.     NG emptied at 0600 for 700 ml    Rolls well. Lap sites are cdi NPO except meds overnight No nausea or vomiting episodes. No fever overnight  NS infusing. RA   Plan tbd        Problem: Adult Inpatient Plan of Care  Goal: Plan of Care Review  Description: The Plan of Care Review/Shift note should be completed every shift.  The Outcome Evaluation is a brief statement about your assessment that the patient is improving, declining, or no change.  This information will be displayed automatically on your shift  note.  Outcome: Progressing  Flowsheets (Taken 7/17/2025 0334)  Plan of Care Reviewed With:   patient   parent  Overall Patient Progress: no change  Goal: Patient-Specific Goal (Individualized)  Description: You can add care plan individualizations to a care plan. Examples of Individualization might be:  \"Parent requests to be called daily at 9am for status\", \"I have a hard time hearing out of my right ear\", or \"Do not touch me to wake me up as it startles  me\".  Outcome: Progressing  Goal: Absence of Hospital-Acquired Illness or Injury  Outcome: Progressing  Intervention: Identify and Manage Fall Risk  Recent Flowsheet Documentation  Taken 7/16/2025 2133 by Sukhi Thurman, RN  Safety Promotion/Fall Prevention:   activity supervised   assistive device/personal items within reach   safety round/check completed   nonskid shoes/slippers when out of bed   clutter free environment maintained  Intervention: Prevent Skin Injury  Recent Flowsheet Documentation  Taken 7/16/2025 2133 by Sukhi Thurman, RN  Body Position: position changed independently  Intervention: Prevent Infection  Recent Flowsheet Documentation  Taken " 7/16/2025 2133 by Sukhi Thurman, RN  Infection Prevention:   single patient room provided   rest/sleep promoted   hand hygiene promoted  Goal: Optimal Comfort and Wellbeing  Outcome: Progressing  Goal: Readiness for Transition of Care  Outcome: Progressing     Problem: Pain Acute  Goal: Optimal Pain Control and Function  Outcome: Progressing  Intervention: Prevent or Manage Pain  Recent Flowsheet Documentation  Taken 7/16/2025 2133 by Sukhi Thurman, RN  Medication Review/Management: medications reviewed     Problem: Excessive Substance Use  Goal: Optimized Energy Level (Excessive Substance Use)  Outcome: Progressing  Goal: Improved Behavioral Control (Excessive Substance Use)  Outcome: Progressing  Goal: Increased Participation and Engagement (Excessive Substance Use)  Outcome: Progressing  Goal: Improved Physiologic Symptoms (Excessive Substance Use)  Outcome: Progressing  Goal: Enhanced Social, Occupational or Functional Skills (Excessive Substance Use)  Outcome: Progressing

## 2025-07-17 NOTE — PROGRESS NOTES
[facility]   General Surgery Progress Note         Assessment and Plan:   Assessment:   POD#1 s/p diagnostic laparoscopy. Findings include dilated proximal small bowel with gradual transition to decompressed small bowel. No adhesions, no perforation. No succus or stool. no perforation of the stomach or duodenum. Normal appearing gallbladder with cirrhotic liver.   Suspect pneumoperitoneum was from prior paracentesis 1 week prior and patient has developed ileus from SBP       Plan:   -Continue NPO/NGT  -NGT clamping trial, if less than 200cc can remove NG tube  -increase actiivty as tolerated, sit up in chair, walk as able  -IV ABX: ceftriaxone  -Following     Overall doing pretty well having bowel function and no pain.   NG clamp trial today and possible remove. Advance diet slowly after removal.  Gini Cantu MD          Interval History:   Resting in bed. States he feels better today. Pain is gone. He started passing some flatus this morning. Still feels bloated but it has improved since yesterday. Has not been out of bed. Starr in place. NPO/NGT.          Physical Exam:   Blood pressure (!) 151/79, pulse 106, temperature 98  F (36.7  C), temperature source Temporal, resp. rate 14, height 1.829 m (6'), weight 111.4 kg (245 lb 9.5 oz), SpO2 92%.    I/O last 3 completed shifts:  In: 1240 [P.O.:240; I.V.:1000]  Out: 2490 [Urine:1390; Emesis/NG output:1100]    Abdomen: soft, +moderately distended, NT, +BS  Inc(s) - clean, dry, intact with dermabond glue, no erythema            Data:     Recent Labs   Lab Test 07/17/25  0746 07/16/25  0830 07/15/25  0853   HGB 11.5* 11.9* 12.2*   WBC 8.4 11.2* 12.1*     Recent Labs   Lab 07/17/25  0746 07/16/25  0830 07/15/25  0622 07/14/25  0700   AST  --  101* 108* 105*   ALT 46 46 47 41   ALKPHOS 150 150 156* 141   BILITOTAL 12.2* 13.4* 12.5* 11.7*          Kyle Thorne PA-C  Text page [phone] 8am-4pm  After 4pm call [phone]

## 2025-07-17 NOTE — PROGRESS NOTES
07/17/25 1552   Appointment Info   Signing Clinician's Name / Credentials (OT) Naty Olivera, OTR/L   Living Environment   People in Home spouse   Current Living Arrangements apartment   Home Accessibility stairs to enter home   Number of Stairs, Main Entrance greater than 10 stairs  (2nd floor apartment, no elevator)   Stair Railings, Main Entrance railings on both sides of stairs  (but too wide to provide significant support B)   Transportation Anticipated car, drives self;family or friend will provide   Living Environment Comments Pt lives w/ spouse in 2nd floor apartment, no elevator. Pt has tub shower. Laundry located in basement of apartment building   Self-Care   Usual Activity Tolerance excellent   Current Activity Tolerance poor   Equipment Currently Used at Home none   Fall history within last six months no   Activity/Exercise/Self-Care Comment Pt reports ind w/ ADLs at baseline. Pt uses no AD for baseline mobility   Instrumental Activities of Daily Living (IADL)   IADL Comments Pt reports ind w/ IADLs at baseline. Pt works as , able to sit as needed. Pt and spouse both drive. Pt and spouse share laundry, cooking, cleaning. Pt reports spouse able to assist as needed w/ IADLs   General Information   Onset of Illness/Injury or Date of Surgery 07/08/25   Referring Physician Gini Cantu MD   Patient/Family Therapy Goal Statement (OT) To reduce pain   Additional Occupational Profile Info/Pertinent History of Current Problem 35 year old male admitted on 7/8/2025. He has little past medical history who presents to the ED for evaluation of abdominal pain. POD#1 s/p diagnostic laparoscopy.   Existing Precautions/Restrictions fall;NPO   Cognitive Status Examination   Orientation Status person;time   Affect/Mental Status (Cognitive) WFL   Follows Commands repetition of directions required;verbal cues/prompting required   Cognitive Status Comments Pt oriented to self, date, situation, unable to  recall name of hospital. Pt benefits from repetition of VC. Will cont ot monitor   Visual Perception   Visual Impairment/Limitations WFL   Sensory   Sensory Quick Adds sensation intact   Pain Assessment   Patient Currently in Pain Yes, see Vital Sign flowsheet   Range of Motion Comprehensive   Comment, General Range of Motion Not formally assessed, BUE WFL w/ functional tasks.   Strength Comprehensive (MMT)   Comment, General Manual Muscle Testing (MMT) Assessment Not formally assessed, generalized wkns noted   Coordination   Coordination Comments Pt reporting difficulties w/ B hands strength/FMC   Bed Mobility   Bed Mobility supine-sit;sit-supine   Comment (Bed Mobility) Min Ax1 sup<sit and sit<sup; Total Ax2 to boost HOB   Transfers   Transfers sit-stand transfer;toilet transfer   Sit-Stand Transfer   Sit-Stand [place] (Transfers) minimum assist (75% patient effort)   Toilet Transfer   Type (Toilet Transfer) sit-stand;stand-sit   [place] Level (Toilet Transfer) minimum assist (75% patient effort)   Assistive Device (Toilet Transfer) commode chair   Balance   Balance Comments Not formally assessed, no LOB noted during session   Activities of Daily Living   BADL Assessment/Intervention lower body dressing;grooming;toileting   Lower Body Dressing Assessment/Training   [place] Level (Lower Body Dressing) dependent (less than 25% patient effort)   Grooming Assessment/Training   [place] Level (Grooming) supervision;set up   Comment, (Grooming) Per clinical judgement   Toileting   [place] Level (Toileting) minimum assist (75% patient effort)   Comment, (Toileting) Pt practiced transfer - Per clinical judgement   Clinical Impression   Criteria for Skilled Therapeutic Interventions Met (OT) Yes, treatment indicated   OT Diagnosis Impaired I/ADL independence   OT Problem List-Impairments impacting ADL problems related to;activity tolerance impaired;mobility;range of motion  (ROM);strength;pain;post-surgical precautions   Assessment of Occupational Performance 3-5 Performance Deficits   Identified Performance Deficits toileting, dressing, g/h, functional mobility, IADLs   Planned Therapy Interventions (OT) ADL retraining;IADL retraining;strengthening;ROM;transfer training;home program guidelines;progressive activity/exercise;risk factor education   Clinical Decision Making Complexity (OT) problem focused assessment/low complexity   Risk & Benefits of therapy have been explained evaluation/treatment results reviewed;care plan/treatment goals reviewed;risks/benefits reviewed;current/potential barriers reviewed;participants voiced agreement with care plan;participants included;patient   OT Total Evaluation Time   OT Eval, Low Complexity Minutes (60458) 8   OT Goals   Therapy Frequency (OT) Daily   OT Predicted Duration/Target Date for Goal Attainment 07/24/25   OT Goals Hygiene/Grooming;Upper Body Dressing;Lower Body Dressing;Transfers;Toilet Transfer/Toileting;Cognition;OT Goal 1   OT: Hygiene/Grooming independent;while standing   OT: Upper Body Dressing Independent;including set-up/clothing retrieval   OT: Lower Body Dressing Modified independent;using adaptive equipment;including set-up/clothing retrieval   OT: Transfer Independent  (Shower tub tx)   OT: Toilet Transfer/Toileting Modified independent;toilet transfer;cleaning and garment management;using adaptive equipment   OT: Cognitive Patient/caregiver will verbalize understanding of cognitive assessment results/recommendations as needed for safe discharge planning  (As needed)   OT: Goal 1 Pt will incorporate 3 EC/WS methods into ADL routine to inc ind and safety in home environment   Interventions   Interventions Quick Adds Self-Care/Home Management   Self-Care/Home Management   Self-Care/Home Mgmt/ADL, Compensatory, Meal Prep Minutes (85491) 16   Symptoms Noted During/After Treatment (Meal Preparation/Planning Training) increased  pain   Treatment Detail/Skilled Intervention Pt greeted supine in bed, agreeable to OT. RN present, assisting pt w/ urinal. NG tube clamped during session. Pt sup<sit EOB Min A for BLE. Pt STS w/ FWW Min A w/ edu on safe hand placement during transfers. Pt amb to BR w/ FWW CGA, OT managing IV pole. Pt attempting to transfer to toilet w/ grab bar Min A, unable to achieve safely d/t pain from low seat height. Pt transfer to/from commode Min A. Pt amb w/ FWW close SBA, transfer EOB CGA. Pt unable to achieve fig 4 method for doff/don socks, agreeable to trial AE next session. Pt sit<sup Min A for BLE. Pt unable to scoot to HOB. RN present for second assist, Total Ax2 to scoot HOB w/ chux. Discussed DC rec (ARU), pt agreeable. Pt in bed end of session, alarm on, all needs within reach   OT Discharge Planning   OT Plan toileting, progress standing act reyes, LB dressing w/ AE, g/h sinkside   OT Discharge Recommendation (DC Rec) [facility] patient will benefit from intensive, interdisciplinary therapy.  Anticipate will be able to tolerate 3 hours of therapy per day   OT Rationale for DC Rec Pt functioning below baseline, limited by dec act reyes, pain. Pt lives w/ spouse in second floor apt w/o elevator. Pt reports ind w/ ADLs at baseline, currently Ax1 for inc safety. Pt would benefit from intensive therapy in ARU setting to progress I/ADL ind. If pt progress w/ pain med mgmt and mobility, may progress to DC home w/ assist from spouse as pt very ind prior to adimssion. IP OT will cont to follow   OT Brief overview of current status Goals of therapy will be to address safe mobility and make recs for d/c to next level of care. Pt and RN will continue to follow all falls risk precautions as documented by RN staff while hospitalized. Ax1 bed mobility, Ax1 w/ FWW, Ax1 transfer to commode   OT Total Distance Amb During Session (feet) 25   Total Session Time   Timed Code Treatment Minutes 16   Total Session Time  (sum of timed and untimed services) 24

## 2025-07-17 NOTE — PROGRESS NOTES
[facility] Chart Check:    35 year old male without significant PMH other than moderate alcohol use,  that presented on 7/8 with symptoms of RUQ abdominal pain.     Presenting labs showed AST of 231, ALT of 64, alk phos of 286, total bili of 5.9, normal lipase, and platelet level of 68.  Paracentesis performed.     Paracentesis with absolute neutrophil count of > 89,000.  Patient had an US on 7/25, which interestingly showed no fluid to tap.       Liver cirrhosis noted on imaging.  Hepatic panel AST> ALT, alcoholic pattern/alcoholic hepatitis. MRCP negative for biliary etiology of elevated LFTs.       Hepatitis B/C negative, iron studies negative for iron overload.      He continues to have emesis.  Likely 2/2 distention of abdomen, although there was no significant ascites noted on US yesterday amenable to removal      Appreciate interventions for nurses, IM, and surgery regarding CT scan which was performed last yesterday afternoon for increase abdominal distension.  Free air was noted.  NG placed.  Exploratory Lap showing dilated proximal small bowel with gradual transition to decompressed small bowel. No adhesions or perforation.      Remains on Ceftriaxone, NG tube to suction, clamping trial today.    We will continue to follow patient and watch blood work, clinical progress.    Laura Chan, DAVID, CNP  [facility]   [phone]

## 2025-07-18 VITALS
HEART RATE: 100 BPM | OXYGEN SATURATION: 94 % | DIASTOLIC BLOOD PRESSURE: 75 MMHG | HEIGHT: 72 IN | SYSTOLIC BLOOD PRESSURE: 126 MMHG | TEMPERATURE: 97.6 F | BODY MASS INDEX: 33.26 KG/M2 | WEIGHT: 245.59 LBS | RESPIRATION RATE: 20 BRPM

## 2025-07-19 ENCOUNTER — APPOINTMENT (OUTPATIENT)
Dept: PHYSICAL THERAPY | Facility: CLINIC | Age: 35
End: 2025-07-19
Payer: COMMERCIAL

## 2025-07-19 ENCOUNTER — APPOINTMENT (OUTPATIENT)
Dept: OCCUPATIONAL THERAPY | Facility: CLINIC | Age: 35
End: 2025-07-19
Payer: COMMERCIAL

## 2025-07-19 LAB
ALBUMIN SERPL BCG-MCNC: 3.1 G/DL (ref 3.5–5.2)
ALP SERPL-CCNC: 223 U/L (ref 40–150)
ALT SERPL W P-5'-P-CCNC: 88 U/L (ref 0–70)
ANION GAP SERPL CALCULATED.3IONS-SCNC: 13 MMOL/L (ref 7–15)
AST SERPL W P-5'-P-CCNC: 158 U/L (ref 0–45)
BILIRUB SERPL-MCNC: 12.6 MG/DL
BUN SERPL-MCNC: 15.7 MG/DL (ref 6–20)
CALCIUM SERPL-MCNC: 9.2 MG/DL (ref 8.8–10.4)
CHLORIDE SERPL-SCNC: 98 MMOL/L (ref 98–107)
CREAT SERPL-MCNC: 0.53 MG/DL (ref 0.67–1.17)
EGFRCR SERPLBLD CKD-EPI 2021: >90 ML/MIN/1.73M2
GLUCOSE SERPL-MCNC: 157 MG/DL (ref 70–99)
HCO3 SERPL-SCNC: 21 MMOL/L (ref 22–29)
POTASSIUM SERPL-SCNC: 3.4 MMOL/L (ref 3.4–5.3)
PROT SERPL-MCNC: 7 G/DL (ref 6.4–8.3)
SODIUM SERPL-SCNC: 132 MMOL/L (ref 135–145)

## 2025-07-20 ENCOUNTER — APPOINTMENT (OUTPATIENT)
Dept: PHYSICAL THERAPY | Facility: CLINIC | Age: 35
End: 2025-07-20
Payer: COMMERCIAL

## 2025-07-20 ENCOUNTER — HEALTH MAINTENANCE LETTER (OUTPATIENT)
Age: 35
End: 2025-07-20

## 2025-07-20 LAB
ALBUMIN SERPL BCG-MCNC: 3 G/DL (ref 3.5–5.2)
ALP SERPL-CCNC: 236 U/L (ref 40–150)
ALT SERPL W P-5'-P-CCNC: 89 U/L (ref 0–70)
ANION GAP SERPL CALCULATED.3IONS-SCNC: 14 MMOL/L (ref 7–15)
AST SERPL W P-5'-P-CCNC: 147 U/L (ref 0–45)
BACTERIA SPEC CULT: NO GROWTH
BACTERIA SPEC CULT: NO GROWTH
BILIRUB SERPL-MCNC: 13.9 MG/DL
BUN SERPL-MCNC: 12.6 MG/DL (ref 6–20)
CALCIUM SERPL-MCNC: 9.1 MG/DL (ref 8.8–10.4)
CHLORIDE SERPL-SCNC: 97 MMOL/L (ref 98–107)
CREAT SERPL-MCNC: 0.51 MG/DL (ref 0.67–1.17)
EGFRCR SERPLBLD CKD-EPI 2021: >90 ML/MIN/1.73M2
GLUCOSE SERPL-MCNC: 129 MG/DL (ref 70–99)
HCO3 SERPL-SCNC: 21 MMOL/L (ref 22–29)
POTASSIUM SERPL-SCNC: 3.4 MMOL/L (ref 3.4–5.3)
PROT SERPL-MCNC: 7 G/DL (ref 6.4–8.3)
SODIUM SERPL-SCNC: 132 MMOL/L (ref 135–145)

## 2025-07-21 LAB
ALBUMIN SERPL BCG-MCNC: 3 G/DL (ref 3.5–5.2)
ALP SERPL-CCNC: 268 U/L (ref 40–150)
ALT SERPL W P-5'-P-CCNC: 89 U/L (ref 0–70)
ANION GAP SERPL CALCULATED.3IONS-SCNC: 12 MMOL/L (ref 7–15)
AST SERPL W P-5'-P-CCNC: 136 U/L (ref 0–45)
BILIRUB SERPL-MCNC: 14.7 MG/DL
BUN SERPL-MCNC: 12.3 MG/DL (ref 6–20)
CALCIUM SERPL-MCNC: 9.2 MG/DL (ref 8.8–10.4)
CHLORIDE SERPL-SCNC: 97 MMOL/L (ref 98–107)
CREAT SERPL-MCNC: 0.39 MG/DL (ref 0.67–1.17)
EGFRCR SERPLBLD CKD-EPI 2021: >90 ML/MIN/1.73M2
GLUCOSE SERPL-MCNC: 133 MG/DL (ref 70–99)
HCO3 SERPL-SCNC: 21 MMOL/L (ref 22–29)
HOLD SPECIMEN: NORMAL
INR PPP: 2.16 (ref 0.85–1.15)
POTASSIUM SERPL-SCNC: 3.3 MMOL/L (ref 3.4–5.3)
POTASSIUM SERPL-SCNC: 3.6 MMOL/L (ref 3.4–5.3)
PROT SERPL-MCNC: 7.1 G/DL (ref 6.4–8.3)
PROTHROMBIN TIME: 23.8 SECONDS (ref 11.8–14.8)
SODIUM SERPL-SCNC: 130 MMOL/L (ref 135–145)

## 2025-07-22 LAB
ALBUMIN SERPL BCG-MCNC: 3 G/DL (ref 3.5–5.2)
ALP SERPL-CCNC: 269 U/L (ref 40–150)
ALT SERPL W P-5'-P-CCNC: 89 U/L (ref 0–70)
ANION GAP SERPL CALCULATED.3IONS-SCNC: 14 MMOL/L (ref 7–15)
AST SERPL W P-5'-P-CCNC: 136 U/L (ref 0–45)
BILIRUB SERPL-MCNC: 14.9 MG/DL
BUN SERPL-MCNC: 11.3 MG/DL (ref 6–20)
CALCIUM SERPL-MCNC: 9 MG/DL (ref 8.8–10.4)
CHLORIDE SERPL-SCNC: 99 MMOL/L (ref 98–107)
CREAT SERPL-MCNC: 0.49 MG/DL (ref 0.67–1.17)
EGFRCR SERPLBLD CKD-EPI 2021: >90 ML/MIN/1.73M2
GLUCOSE SERPL-MCNC: 127 MG/DL (ref 70–99)
HCO3 SERPL-SCNC: 20 MMOL/L (ref 22–29)
POTASSIUM SERPL-SCNC: 3.8 MMOL/L (ref 3.4–5.3)
PROT SERPL-MCNC: 7 G/DL (ref 6.4–8.3)
SODIUM SERPL-SCNC: 133 MMOL/L (ref 135–145)

## 2025-07-26 ENCOUNTER — LAB (OUTPATIENT)
Dept: LAB | Facility: CLINIC | Age: 35
End: 2025-07-26
Payer: COMMERCIAL

## 2025-07-26 ENCOUNTER — APPOINTMENT (OUTPATIENT)
Dept: CT IMAGING | Facility: CLINIC | Age: 35
End: 2025-07-26
Attending: EMERGENCY MEDICINE
Payer: COMMERCIAL

## 2025-07-26 ENCOUNTER — APPOINTMENT (OUTPATIENT)
Dept: ULTRASOUND IMAGING | Facility: CLINIC | Age: 35
End: 2025-07-26
Attending: EMERGENCY MEDICINE
Payer: COMMERCIAL

## 2025-07-26 ENCOUNTER — TELEPHONE (OUTPATIENT)
Dept: INTERNAL MEDICINE | Facility: CLINIC | Age: 35
End: 2025-07-26

## 2025-07-26 ENCOUNTER — HOSPITAL ENCOUNTER (INPATIENT)
Facility: CLINIC | Age: 35
LOS: 3 days | Discharge: HOME | End: 2025-07-29
Attending: EMERGENCY MEDICINE | Admitting: HOSPITALIST
Payer: COMMERCIAL

## 2025-07-26 DIAGNOSIS — Z13.220 SCREENING FOR HYPERLIPIDEMIA: ICD-10-CM

## 2025-07-26 DIAGNOSIS — K70.11 ALCOHOLIC HEPATITIS WITH ASCITES (H): ICD-10-CM

## 2025-07-26 DIAGNOSIS — N18.9 ACUTE RENAL FAILURE SUPERIMPOSED ON CHRONIC KIDNEY DISEASE, UNSPECIFIED ACUTE RENAL FAILURE TYPE, UNSPECIFIED CKD STAGE: Primary | ICD-10-CM

## 2025-07-26 DIAGNOSIS — K65.2 SPONTANEOUS BACTERIAL PERITONITIS (H): ICD-10-CM

## 2025-07-26 DIAGNOSIS — N17.9 ACUTE RENAL FAILURE SUPERIMPOSED ON CHRONIC KIDNEY DISEASE, UNSPECIFIED ACUTE RENAL FAILURE TYPE, UNSPECIFIED CKD STAGE: Primary | ICD-10-CM

## 2025-07-26 DIAGNOSIS — R00.0 TACHYCARDIA: ICD-10-CM

## 2025-07-26 LAB
ALBUMIN SERPL BCG-MCNC: 3.1 G/DL (ref 3.5–5.2)
ALBUMIN SERPL BCG-MCNC: 3.3 G/DL (ref 3.5–5.2)
ALBUMIN UR-MCNC: 20 MG/DL
ALP SERPL-CCNC: 298 U/L (ref 40–150)
ALP SERPL-CCNC: 340 U/L (ref 40–150)
ALT SERPL W P-5'-P-CCNC: 86 U/L (ref 0–70)
ALT SERPL W P-5'-P-CCNC: 92 U/L (ref 0–70)
ANION GAP SERPL CALCULATED.3IONS-SCNC: 21 MMOL/L (ref 7–15)
ANION GAP SERPL CALCULATED.3IONS-SCNC: 25 MMOL/L (ref 7–15)
APPEARANCE UR: ABNORMAL
APTT PPP: 35 SECONDS (ref 22–38)
AST SERPL W P-5'-P-CCNC: 206 U/L (ref 0–45)
AST SERPL W P-5'-P-CCNC: 208 U/L (ref 0–45)
BACTERIA #/AREA URNS HPF: ABNORMAL /HPF
BASOPHILS # BLD AUTO: 0 10E3/UL (ref 0–0.2)
BASOPHILS NFR BLD AUTO: 0 %
BILIRUB DIRECT SERPL-MCNC: 15.85 MG/DL (ref 0–0.3)
BILIRUB SERPL-MCNC: 19.1 MG/DL
BILIRUB SERPL-MCNC: 20.3 MG/DL
BILIRUB UR QL STRIP: ABNORMAL
BUN SERPL-MCNC: 51.6 MG/DL (ref 6–20)
BUN SERPL-MCNC: 56.7 MG/DL (ref 6–20)
CALCIUM SERPL-MCNC: 9 MG/DL (ref 8.8–10.4)
CALCIUM SERPL-MCNC: 9.2 MG/DL (ref 8.8–10.4)
CHLORIDE SERPL-SCNC: 93 MMOL/L (ref 98–107)
CHLORIDE SERPL-SCNC: 95 MMOL/L (ref 98–107)
CHOLEST SERPL-MCNC: ABNORMAL MG/DL
COLOR UR AUTO: ABNORMAL
CREAT SERPL-MCNC: 4.76 MG/DL (ref 0.67–1.17)
CREAT SERPL-MCNC: 4.85 MG/DL (ref 0.67–1.17)
EGFRCR SERPLBLD CKD-EPI 2021: 15 ML/MIN/1.73M2
EGFRCR SERPLBLD CKD-EPI 2021: 15 ML/MIN/1.73M2
EOSINOPHIL # BLD AUTO: 0.2 10E3/UL (ref 0–0.7)
EOSINOPHIL NFR BLD AUTO: 2 %
ERYTHROCYTE [DISTWIDTH] IN BLOOD BY AUTOMATED COUNT: 14.3 % (ref 10–15)
ERYTHROCYTE [DISTWIDTH] IN BLOOD BY AUTOMATED COUNT: 14.3 % (ref 10–15)
ETHANOL SERPL-MCNC: <0.01 G/DL
FASTING STATUS PATIENT QL REPORTED: NO
FASTING STATUS PATIENT QL REPORTED: NO
GLUCOSE SERPL-MCNC: 125 MG/DL (ref 70–99)
GLUCOSE SERPL-MCNC: 127 MG/DL (ref 70–99)
GLUCOSE UR STRIP-MCNC: NEGATIVE MG/DL
HCO3 SERPL-SCNC: 15 MMOL/L (ref 22–29)
HCO3 SERPL-SCNC: 15 MMOL/L (ref 22–29)
HCT VFR BLD AUTO: 29.3 % (ref 40–53)
HCT VFR BLD AUTO: 30.3 % (ref 40–53)
HDLC SERPL-MCNC: 9 MG/DL
HGB BLD-MCNC: 10.4 G/DL (ref 13.3–17.7)
HGB BLD-MCNC: 10.5 G/DL (ref 13.3–17.7)
HGB UR QL STRIP: ABNORMAL
HYALINE CASTS: 4 /LPF
IMM GRANULOCYTES # BLD: 0.3 10E3/UL
IMM GRANULOCYTES NFR BLD: 3 %
INR PPP: 2.38 (ref 0.85–1.15)
KETONES UR STRIP-MCNC: NEGATIVE MG/DL
LACTATE SERPL-SCNC: 1.3 MMOL/L (ref 0.7–2)
LDLC SERPL CALC-MCNC: ABNORMAL MG/DL
LEUKOCYTE ESTERASE UR QL STRIP: ABNORMAL
LYMPHOCYTES # BLD AUTO: 0.8 10E3/UL (ref 0.8–5.3)
LYMPHOCYTES NFR BLD AUTO: 9 %
MAGNESIUM SERPL-MCNC: 1.9 MG/DL (ref 1.7–2.3)
MCH RBC QN AUTO: 33.9 PG (ref 26.5–33)
MCH RBC QN AUTO: 34.2 PG (ref 26.5–33)
MCHC RBC AUTO-ENTMCNC: 34.7 G/DL (ref 31.5–36.5)
MCHC RBC AUTO-ENTMCNC: 35.5 G/DL (ref 31.5–36.5)
MCV RBC AUTO: 96 FL (ref 78–100)
MCV RBC AUTO: 98 FL (ref 78–100)
MONOCYTES # BLD AUTO: 0.5 10E3/UL (ref 0–1.3)
MONOCYTES NFR BLD AUTO: 5 %
MUCOUS THREADS #/AREA URNS LPF: PRESENT /LPF
NEUTROPHILS # BLD AUTO: 7.7 10E3/UL (ref 1.6–8.3)
NEUTROPHILS NFR BLD AUTO: 81 %
NITRATE UR QL: NEGATIVE
NONHDLC SERPL-MCNC: ABNORMAL MG/DL
NRBC # BLD AUTO: 0 10E3/UL
NRBC BLD AUTO-RTO: 0 /100
PH UR STRIP: 5 [PH] (ref 5–7)
PLATELET # BLD AUTO: 177 10E3/UL (ref 150–450)
PLATELET # BLD AUTO: 178 10E3/UL (ref 150–450)
POTASSIUM SERPL-SCNC: 4.1 MMOL/L (ref 3.4–5.3)
POTASSIUM SERPL-SCNC: 4.3 MMOL/L (ref 3.4–5.3)
PROT SERPL-MCNC: 7.7 G/DL (ref 6.4–8.3)
PROT SERPL-MCNC: ABNORMAL G/DL
PROTHROMBIN TIME: 25.6 SECONDS (ref 11.8–14.8)
RBC # BLD AUTO: 3.04 10E6/UL (ref 4.4–5.9)
RBC # BLD AUTO: 3.1 10E6/UL (ref 4.4–5.9)
RBC URINE: 129 /HPF
SODIUM SERPL-SCNC: 131 MMOL/L (ref 135–145)
SODIUM SERPL-SCNC: 133 MMOL/L (ref 135–145)
SP GR UR STRIP: 1.02 (ref 1–1.03)
SQUAMOUS EPITHELIAL: 2 /HPF
T4 FREE SERPL-MCNC: 0.92 NG/DL (ref 0.9–1.7)
TRIGL SERPL-MCNC: ABNORMAL MG/DL
TSH SERPL DL<=0.005 MIU/L-ACNC: 6.75 UIU/ML (ref 0.3–4.2)
UROBILINOGEN UR STRIP-MCNC: 2 MG/DL
WBC # BLD AUTO: 8.3 10E3/UL (ref 4–11)
WBC # BLD AUTO: 9.4 10E3/UL (ref 4–11)
WBC URINE: 9 /HPF

## 2025-07-26 PROCEDURE — 84540 ASSAY OF URINE/UREA-N: CPT | Performed by: HOSPITALIST

## 2025-07-26 PROCEDURE — 36415 COLL VENOUS BLD VENIPUNCTURE: CPT | Performed by: EMERGENCY MEDICINE

## 2025-07-26 PROCEDURE — 258N000003 HC RX IP 258 OP 636: Performed by: HOSPITALIST

## 2025-07-26 PROCEDURE — 120N000001 HC R&B MED SURG/OB

## 2025-07-26 PROCEDURE — 93005 ELECTROCARDIOGRAM TRACING: CPT

## 2025-07-26 PROCEDURE — 82310 ASSAY OF CALCIUM: CPT | Performed by: EMERGENCY MEDICINE

## 2025-07-26 PROCEDURE — 81001 URINALYSIS AUTO W/SCOPE: CPT | Performed by: EMERGENCY MEDICINE

## 2025-07-26 PROCEDURE — 84443 ASSAY THYROID STIM HORMONE: CPT

## 2025-07-26 PROCEDURE — 84450 TRANSFERASE (AST) (SGOT): CPT | Performed by: EMERGENCY MEDICINE

## 2025-07-26 PROCEDURE — 85025 COMPLETE CBC W/AUTO DIFF WBC: CPT

## 2025-07-26 PROCEDURE — 99291 CRITICAL CARE FIRST HOUR: CPT | Mod: 25 | Performed by: EMERGENCY MEDICINE

## 2025-07-26 PROCEDURE — 99223 1ST HOSP IP/OBS HIGH 75: CPT | Performed by: HOSPITALIST

## 2025-07-26 PROCEDURE — 85610 PROTHROMBIN TIME: CPT | Performed by: EMERGENCY MEDICINE

## 2025-07-26 PROCEDURE — 84075 ASSAY ALKALINE PHOSPHATASE: CPT

## 2025-07-26 PROCEDURE — 85730 THROMBOPLASTIN TIME PARTIAL: CPT | Performed by: EMERGENCY MEDICINE

## 2025-07-26 PROCEDURE — 84439 ASSAY OF FREE THYROXINE: CPT

## 2025-07-26 PROCEDURE — 76700 US EXAM ABDOM COMPLETE: CPT

## 2025-07-26 PROCEDURE — 258N000003 HC RX IP 258 OP 636: Performed by: EMERGENCY MEDICINE

## 2025-07-26 PROCEDURE — 83605 ASSAY OF LACTIC ACID: CPT | Performed by: EMERGENCY MEDICINE

## 2025-07-26 PROCEDURE — 85018 HEMOGLOBIN: CPT | Performed by: EMERGENCY MEDICINE

## 2025-07-26 PROCEDURE — 83735 ASSAY OF MAGNESIUM: CPT | Performed by: EMERGENCY MEDICINE

## 2025-07-26 PROCEDURE — 36415 COLL VENOUS BLD VENIPUNCTURE: CPT

## 2025-07-26 PROCEDURE — 80061 LIPID PANEL: CPT

## 2025-07-26 PROCEDURE — 74176 CT ABD & PELVIS W/O CONTRAST: CPT

## 2025-07-26 PROCEDURE — 80053 COMPREHEN METABOLIC PANEL: CPT | Mod: 59

## 2025-07-26 PROCEDURE — 82077 ASSAY SPEC XCP UR&BREATH IA: CPT | Performed by: EMERGENCY MEDICINE

## 2025-07-26 PROCEDURE — 250N000011 HC RX IP 250 OP 636: Performed by: EMERGENCY MEDICINE

## 2025-07-26 PROCEDURE — 87040 BLOOD CULTURE FOR BACTERIA: CPT | Performed by: EMERGENCY MEDICINE

## 2025-07-26 RX ORDER — SODIUM CHLORIDE 9 MG/ML
INJECTION, SOLUTION INTRAVENOUS CONTINUOUS
Status: DISCONTINUED | OUTPATIENT
Start: 2025-07-26 | End: 2025-07-27

## 2025-07-26 RX ORDER — AMOXICILLIN 250 MG
1 CAPSULE ORAL 2 TIMES DAILY PRN
Status: DISCONTINUED | OUTPATIENT
Start: 2025-07-26 | End: 2025-07-29 | Stop reason: HOSPADM

## 2025-07-26 RX ORDER — CEFTRIAXONE 2 G/1
2 INJECTION, POWDER, FOR SOLUTION INTRAMUSCULAR; INTRAVENOUS ONCE
Status: COMPLETED | OUTPATIENT
Start: 2025-07-26 | End: 2025-07-26

## 2025-07-26 RX ORDER — CEFTRIAXONE 2 G/1
2 INJECTION, POWDER, FOR SOLUTION INTRAMUSCULAR; INTRAVENOUS EVERY 24 HOURS
Status: DISCONTINUED | OUTPATIENT
Start: 2025-07-27 | End: 2025-07-28

## 2025-07-26 RX ORDER — LIDOCAINE 40 MG/G
CREAM TOPICAL
Status: DISCONTINUED | OUTPATIENT
Start: 2025-07-26 | End: 2025-07-29 | Stop reason: HOSPADM

## 2025-07-26 RX ORDER — FOLIC ACID 1 MG/1
1 TABLET ORAL DAILY
Status: DISCONTINUED | OUTPATIENT
Start: 2025-07-27 | End: 2025-07-29 | Stop reason: HOSPADM

## 2025-07-26 RX ORDER — CALCIUM CARBONATE 500 MG/1
1000 TABLET, CHEWABLE ORAL 4 TIMES DAILY PRN
Status: DISCONTINUED | OUTPATIENT
Start: 2025-07-26 | End: 2025-07-27

## 2025-07-26 RX ORDER — AMOXICILLIN 250 MG
2 CAPSULE ORAL 2 TIMES DAILY PRN
Status: DISCONTINUED | OUTPATIENT
Start: 2025-07-26 | End: 2025-07-29 | Stop reason: HOSPADM

## 2025-07-26 RX ORDER — ONDANSETRON 2 MG/ML
4 INJECTION INTRAMUSCULAR; INTRAVENOUS EVERY 6 HOURS PRN
Status: DISCONTINUED | OUTPATIENT
Start: 2025-07-26 | End: 2025-07-29 | Stop reason: HOSPADM

## 2025-07-26 RX ORDER — ONDANSETRON 4 MG/1
4 TABLET, ORALLY DISINTEGRATING ORAL EVERY 6 HOURS PRN
Status: DISCONTINUED | OUTPATIENT
Start: 2025-07-26 | End: 2025-07-29 | Stop reason: HOSPADM

## 2025-07-26 RX ADMIN — SODIUM CHLORIDE: 0.9 INJECTION, SOLUTION INTRAVENOUS at 23:51

## 2025-07-26 RX ADMIN — CEFTRIAXONE 2 G: 2 INJECTION, POWDER, FOR SOLUTION INTRAMUSCULAR; INTRAVENOUS at 23:18

## 2025-07-26 RX ADMIN — SODIUM CHLORIDE: 0.9 INJECTION, SOLUTION INTRAVENOUS at 21:58

## 2025-07-26 ASSESSMENT — ACTIVITIES OF DAILY LIVING (ADL)
ADLS_ACUITY_SCORE: 53

## 2025-07-26 NOTE — LETTER
Ricci Brown was hospitalized for acute illness from 7/26/2025-2/29/2025 for acute illness.  Please excuse him from work during this time.      If you have any questions or concerns, please don't hesitate to call.    Michael Shelton, DO

## 2025-07-26 NOTE — LETTER
[place] [address] [facility]  [address]  [facility] [address]  [phone] July 29, 2025    Ricci Brown  [address] [facility] [address]  [facility] [address]  [phone]     To Whom it may concern:    Ricci Brown was hospitalized for acute illness from 7/26/2025-7/29/2025 for acute illness.  Please excuse him from work during this time.      If you have any questions or concerns, please don't hesitate to call.      Sincerely,    Michael Shelton, DO

## 2025-07-27 LAB
ALBUMIN SERPL BCG-MCNC: 3 G/DL (ref 3.5–5.2)
ALP SERPL-CCNC: 326 U/L (ref 40–150)
ALT SERPL W P-5'-P-CCNC: 82 U/L (ref 0–70)
AMMONIA PLAS-SCNC: 86 UMOL/L (ref 16–60)
ANION GAP SERPL CALCULATED.3IONS-SCNC: 21 MMOL/L (ref 7–15)
AST SERPL W P-5'-P-CCNC: 195 U/L (ref 0–45)
B-OH-BUTYR SERPL-SCNC: 0.48 MMOL/L
BILIRUB SERPL-MCNC: 19.5 MG/DL
BUN SERPL-MCNC: 59.4 MG/DL (ref 6–20)
CALCIUM SERPL-MCNC: 9 MG/DL (ref 8.8–10.4)
CHLORIDE SERPL-SCNC: 98 MMOL/L (ref 98–107)
CREAT SERPL-MCNC: 4.9 MG/DL (ref 0.67–1.17)
CREAT SERPL-MCNC: 5.06 MG/DL (ref 0.67–1.17)
CREAT UR-MCNC: 224.3 MG/DL
EGFRCR SERPLBLD CKD-EPI 2021: 14 ML/MIN/1.73M2
EGFRCR SERPLBLD CKD-EPI 2021: 15 ML/MIN/1.73M2
ERYTHROCYTE [DISTWIDTH] IN BLOOD BY AUTOMATED COUNT: 14.3 % (ref 10–15)
FRACT EXCRET NA UR+SERPL-RTO: 0.7 %
GLUCOSE SERPL-MCNC: 111 MG/DL (ref 70–99)
HCO3 SERPL-SCNC: 14 MMOL/L (ref 22–29)
HCT VFR BLD AUTO: 28.8 % (ref 40–53)
HGB BLD-MCNC: 9.9 G/DL (ref 13.3–17.7)
INR PPP: 2.49 (ref 0.85–1.15)
MCH RBC QN AUTO: 33.8 PG (ref 26.5–33)
MCHC RBC AUTO-ENTMCNC: 34.4 G/DL (ref 31.5–36.5)
MCV RBC AUTO: 98 FL (ref 78–100)
PLATELET # BLD AUTO: 167 10E3/UL (ref 150–450)
POTASSIUM SERPL-SCNC: 4.3 MMOL/L (ref 3.4–5.3)
PROCALCITONIN SERPL IA-MCNC: 2.4 NG/ML
PROT SERPL-MCNC: 7 G/DL (ref 6.4–8.3)
PROTHROMBIN TIME: 26.5 SECONDS (ref 11.8–14.8)
RBC # BLD AUTO: 2.93 10E6/UL (ref 4.4–5.9)
SODIUM SERPL-SCNC: 130 MMOL/L (ref 135–145)
SODIUM SERPL-SCNC: 133 MMOL/L (ref 135–145)
SODIUM UR-SCNC: 40 MMOL/L
UUN UR-MCNC: 255 MG/DL (ref 801–1666)
WBC # BLD AUTO: 9 10E3/UL (ref 4–11)

## 2025-07-27 PROCEDURE — 82010 KETONE BODYS QUAN: CPT | Performed by: INTERNAL MEDICINE

## 2025-07-27 PROCEDURE — 999N000127 HC STATISTIC PERIPHERAL IV START W US GUIDANCE

## 2025-07-27 PROCEDURE — 85041 AUTOMATED RBC COUNT: CPT | Performed by: HOSPITALIST

## 2025-07-27 PROCEDURE — 258N000003 HC RX IP 258 OP 636: Performed by: HOSPITALIST

## 2025-07-27 PROCEDURE — 82565 ASSAY OF CREATININE: CPT | Performed by: INTERNAL MEDICINE

## 2025-07-27 PROCEDURE — 82435 ASSAY OF BLOOD CHLORIDE: CPT | Performed by: HOSPITALIST

## 2025-07-27 PROCEDURE — 84145 PROCALCITONIN (PCT): CPT | Performed by: HOSPITALIST

## 2025-07-27 PROCEDURE — 250N000013 HC RX MED GY IP 250 OP 250 PS 637: Performed by: HOSPITALIST

## 2025-07-27 PROCEDURE — 82140 ASSAY OF AMMONIA: CPT | Performed by: HOSPITALIST

## 2025-07-27 PROCEDURE — 84295 ASSAY OF SERUM SODIUM: CPT | Performed by: INTERNAL MEDICINE

## 2025-07-27 PROCEDURE — 250N000009 HC RX 250: Performed by: INTERNAL MEDICINE

## 2025-07-27 PROCEDURE — 250N000011 HC RX IP 250 OP 636: Performed by: HOSPITALIST

## 2025-07-27 PROCEDURE — 99233 SBSQ HOSP IP/OBS HIGH 50: CPT | Performed by: INTERNAL MEDICINE

## 2025-07-27 PROCEDURE — 36415 COLL VENOUS BLD VENIPUNCTURE: CPT | Performed by: HOSPITALIST

## 2025-07-27 PROCEDURE — P9047 ALBUMIN (HUMAN), 25%, 50ML: HCPCS | Performed by: INTERNAL MEDICINE

## 2025-07-27 PROCEDURE — 99254 IP/OBS CNSLTJ NEW/EST MOD 60: CPT | Performed by: INTERNAL MEDICINE

## 2025-07-27 PROCEDURE — 82570 ASSAY OF URINE CREATININE: CPT | Performed by: HOSPITALIST

## 2025-07-27 PROCEDURE — 258N000003 HC RX IP 258 OP 636: Performed by: INTERNAL MEDICINE

## 2025-07-27 PROCEDURE — 120N000001 HC R&B MED SURG/OB

## 2025-07-27 PROCEDURE — 250N000011 HC RX IP 250 OP 636: Performed by: INTERNAL MEDICINE

## 2025-07-27 PROCEDURE — 85610 PROTHROMBIN TIME: CPT | Performed by: HOSPITALIST

## 2025-07-27 PROCEDURE — 36415 COLL VENOUS BLD VENIPUNCTURE: CPT | Performed by: INTERNAL MEDICINE

## 2025-07-27 RX ORDER — CIPROFLOXACIN 500 MG/1
500 TABLET, FILM COATED ORAL EVERY 12 HOURS
Status: DISCONTINUED | OUTPATIENT
Start: 2025-07-27 | End: 2025-07-28

## 2025-07-27 RX ORDER — ALBUMIN (HUMAN) 12.5 G/50ML
25 SOLUTION INTRAVENOUS EVERY 8 HOURS
Status: COMPLETED | OUTPATIENT
Start: 2025-07-27 | End: 2025-07-29

## 2025-07-27 RX ORDER — FUROSEMIDE 20 MG/1
20 TABLET ORAL DAILY
Status: DISCONTINUED | OUTPATIENT
Start: 2025-07-27 | End: 2025-07-27

## 2025-07-27 RX ADMIN — SODIUM BICARBONATE: 84 INJECTION, SOLUTION INTRAVENOUS at 09:03

## 2025-07-27 RX ADMIN — FOLIC ACID 1 MG: 1 TABLET ORAL at 08:18

## 2025-07-27 RX ADMIN — SENNOSIDES AND DOCUSATE SODIUM 1 TABLET: 50; 8.6 TABLET ORAL at 14:13

## 2025-07-27 RX ADMIN — ALBUMIN HUMAN 25 G: 0.25 SOLUTION INTRAVENOUS at 13:07

## 2025-07-27 RX ADMIN — PHYTONADIONE 10 MG: 10 INJECTION, EMULSION INTRAMUSCULAR; INTRAVENOUS; SUBCUTANEOUS at 08:20

## 2025-07-27 RX ADMIN — ALBUMIN HUMAN 25 G: 0.25 SOLUTION INTRAVENOUS at 20:00

## 2025-07-27 RX ADMIN — SENNOSIDES AND DOCUSATE SODIUM 2 TABLET: 50; 8.6 TABLET ORAL at 22:06

## 2025-07-27 RX ADMIN — PHYTONADIONE 10 MG: 10 INJECTION, EMULSION INTRAMUSCULAR; INTRAVENOUS; SUBCUTANEOUS at 01:39

## 2025-07-27 RX ADMIN — CEFTRIAXONE 2 G: 2 INJECTION, POWDER, FOR SOLUTION INTRAMUSCULAR; INTRAVENOUS at 22:50

## 2025-07-27 RX ADMIN — THIAMINE HCL TAB 100 MG 100 MG: 100 TAB at 08:18

## 2025-07-27 RX ADMIN — SODIUM BICARBONATE: 84 INJECTION, SOLUTION INTRAVENOUS at 19:31

## 2025-07-27 ASSESSMENT — ACTIVITIES OF DAILY LIVING (ADL)
ADLS_ACUITY_SCORE: 29

## 2025-07-27 NOTE — ED TRIAGE NOTES
"Pt reports he had his blood drawn today and noted elevated liver test and kidney function test. Pt jaundice. Pt denies pain \"I feel fine\"        "

## 2025-07-27 NOTE — PLAN OF CARE
"Goal Outcome Evaluation:           Overall Patient Progress: no changeOverall Patient Progress: no change    Outcome Evaluation: Alert/oriented. Jaundiced. BLE edema. Lungs clear. Up w/cane SBA. Tele 's. Albumin started today. INR 2.49. Urine icteric, 200ml output . Strict I & O. Fluid restriction 1500ml. Continue to monitor kidney/liver function.      Problem: Adult Inpatient Plan of Care  Goal: Plan of Care Review  Description: The Plan of Care Review/Shift note should be completed every shift.  The Outcome Evaluation is a brief statement about your assessment that the patient is improving, declining, or no change.  This information will be displayed automatically on your shift  note.  Outcome: Not Progressing  Flowsheets (Taken 7/27/2025 1517)  Outcome Evaluation: Alert/oriented. Jaundiced. BLE edema. Lungs clear. Up w/cane SBA. Tele 's. Albumin started today. INR 2.49. Urine icteric, 200ml output . Strict I & O. Fluid restriction 1500ml. Continue to monitor kidney/liver function.  Overall Patient Progress: no change  Goal: Patient-Specific Goal (Individualized)  Description: You can add care plan individualizations to a care plan. Examples of Individualization might be:  \"Parent requests to be called daily at 9am for status\", \"I have a hard time hearing out of my right ear\", or \"Do not touch me to wake me up as it startles  me\".  Outcome: Not Progressing  Goal: Absence of Hospital-Acquired Illness or Injury  Outcome: Not Progressing  Intervention: Identify and Manage Fall Risk  Recent Flowsheet Documentation  Taken 7/27/2025 0840 by Claritza Gerard, RN  Safety Promotion/Fall Prevention: safety round/check completed  Intervention: Prevent Skin Injury  Recent Flowsheet Documentation  Taken 7/27/2025 0840 by Claritza Gerard, RN  Body Position: position changed independently  Intervention: Prevent and Manage VTE (Venous Thromboembolism) Risk  Recent Flowsheet Documentation  Taken 7/27/2025 " 0840 by Claritza Gerard, RN  VTE Prevention/Management: (INR 2.49) patient refused intervention  Goal: Optimal Comfort and Wellbeing  Outcome: Not Progressing  Goal: Readiness for Transition of Care  Outcome: Not Progressing

## 2025-07-27 NOTE — PROGRESS NOTES
[facility]    Medicine Progress Note - Hospitalist Service    Date of Admission:  7/26/2025    Assessment & Plan   Ricci Brown is a 35 year old male with a past medical history of alcohol use disorder, recent admission for SBP along with pneumoperitoneum suspected secondary to paracentesis status post diagnostic laparoscopic on 7/16 that was negative for any perforation, ileus and acute kidney injury who presents with abnormal labs.     Patient was hospitalized here at [facility] from 7/8/2025- 7/21/2025 for SBP, pneumoperitoneum suspected secondary to paracentesis, secondary ileus, acute kidney injury.  He was treated with IV antibiotics along with slow advancement of his diet.  He was discharged on ciprofloxacin for 5 more days along with 20 mg of oral Lasix daily.  He was to continue on ciprofloxacin 500 mg daily indefinitely for prophylaxis of SBP per gastroenterology.  After discharge he had poor fluid intake by report.  He was found on recheck to have ARF and was admitted.     Acute renal failure  Anion gap metabolic acidosis  Mild hyponatremia:  -Patient notes that he has felt quite well since discharge from the hospital.  His energy level has been better.  He denies any fevers or chills.  No abdominal pain.  Denies any nausea or vomiting though liquid intake poor by report.  He had loose stools the day he went home but those have resolved.  He has used oral diclofenac to manage knee pain since discharge.  Also was on recent lasix.  Suspect patient's renal failure is due to dehydration in the setting of poor p.o. intake, furosemide along with contribution of NSAIDs taken on discharge.    -Case discussed with nephrology.  Plan to avoid all diuretics, resumed diet and encouraging intake, continue some IVF with bicarb today.  Suspect dehydration and hope for quick improvement in function.  If not improving and liver remains worse, may need to consider transplant eval.    Recent  SBP:  -Patient received 10 days of ceftriaxone during his recent admission and then was transition to ciprofloxacin on 7/19.  He was discharged with an additional 1 week course of ciprofloxacin.  His cultures were negative from his ascitic fluid along with his blood cultures from his recent admission.  His procalcitonin was trending down.  No definitive new infection.  GI recommended long term cipro.   Continue ceftriaxone today.  If no new culture changes or worsening acute abdominal complaints consider moving back to cipro tomorrow depending on renal function.    Alcoholic cirrhosis with liver failure  Coagulopathy. secondary to liver failure  History of alcohol use disorder:  -Patient has worsening of his LFTs with a rising bilirubin on admission.  He adamantly denies alcohol consumption and he has been with family who also corroborates.  Concerned that patient's progressive liver failure with worsening numbers.      -IV Vitamin K  -GI consult today.  Trend MELD.  Will defer consideration of steroids to GI.      PPE Used:  Mask          Diet: Renal Diet (non-dialysis)    DVT Prophylaxis: Pneumatic Compression Devices  Starr Catheter: Not present  Lines: None     Cardiac Monitoring: ACTIVE order. Indication: Tachyarrhythmias, acute (48 hours)  Code Status: Full Code      Clinically Significant Risk Factors Present on Admission         # Hyponatremia: Lowest Na = 131 mmol/L in last 2 days, will monitor as appropriate  # Hypochloremia: Lowest Cl = 93 mmol/L in last 2 days, will monitor as appropriate     # Anion Gap Metabolic Acidosis: Highest Anion Gap = 25 mmol/L in last 2 days, will monitor and treat as appropriate  # Hypoalbuminemia: Lowest albumin = 3 g/dL at 7/27/2025  6:37 AM, will monitor as appropriate  # Coagulation Defect: INR = 2.49 (Ref range: 0.85 - 1.15) and/or PTT = 35 Seconds (Ref range: 22 - 38 Seconds), will monitor for bleeding   # Acute Kidney Injury, unspecified: based on a >150% or 0.3 mg/dL  "increase in last creatinine compared to past 90 day average, will monitor renal function       # Anemia: based on hgb <11       # Obesity: Estimated body mass index is 32.24 kg/m  as calculated from the following:    Height as of this encounter: 1.829 m (6').    Weight as of this encounter: 107.8 kg (237 lb 11.2 oz).       # Financial/Environmental Concerns:           Disposition Plan     Medically Ready for Discharge: Anticipated in 2-4 Days             Chris Parker,   Hospitalist Service  [facility]  Securely message with [employer] (more info)  Text page via [facility] Paging/Directory   ______________________________________________________________________    Interval History   Assumed hospitalist care, history reviewed.  Mr. Brown reports he feels \"good.\"  Denies any pain, worsening SOB or nausea.  He was surprised by the renal function lab findings.    Physical Exam   Vital Signs: Temp: 98.1  F (36.7  C) Temp src: Oral BP: 112/65 Pulse: 109   Resp: 20 SpO2: 96 % O2 Device: None (Room air)    Weight: 237 lbs 11.2 oz    GEN:  Alert, oriented x 3, appears comfortable.  HEENT:  Normocephalic/atraumatic, + scleral icterus, no nasal discharge, mouth moist.  CV:  Regular rate and rhythm, no loud murmur/rub.  LUNGS:  Clear to auscultation bilaterally without rales/rhonchi/wheezing/retractions.  Symmetric chest rise on inhalation noted.  ABD:  Active bowel sounds, soft, non-tender, mildly distended.  No guarding/rigidity.  EXT:  Trace to +1 periph edema.  No cyanosis.  No new joint synovitis noted.  SKIN:  Dry to touch, no new exanthems noted in the visualized areas.    Medical Decision Making       Over 50 MINUTES SPENT BY ME on the date of service doing chart review, history, exam, documentation & further activities per the note.      Data   Medications   Current Facility-Administered Medications   Medication Dose Route Frequency Provider Last Rate Last Admin    sodium bicarbonate 150 mEq in " D5W 1,000 mL infusion   Intravenous Continuous Chris Parker  mL/hr at 07/27/25 0903 New Bag at 07/27/25 0903     Current Facility-Administered Medications   Medication Dose Route Frequency Provider Last Rate Last Admin    cefTRIAXone (ROCEPHIN) 2 g vial to attach to  ml bag for ADULTS or NS 50 ml bag for PEDS  2 g Intravenous Q24H Baljinder Park MD        [Held by provider] ciprofloxacin (CIPRO) tablet 500 mg  500 mg Oral Q12H Chris Parker DO        folic acid (FOLVITE) tablet 1 mg  1 mg Oral Daily Baljinder Park MD   1 mg at 07/27/25 0818    [Held by provider] furosemide (LASIX) tablet 20 mg  20 mg Oral Daily Chris Parker DO        phytonadione (AQUAMEPHYTON, VITAMIN K) 10 mg in sodium chloride 0.9 % 50 mL intermittent infusion  10 mg Intravenous Daily Baljinder Park  mL/hr at 07/27/25 0820 10 mg at 07/27/25 0820    sodium chloride (PF) 0.9% PF flush 3 mL  3 mL Intracatheter Q8H [facility] Baljinder Park MD        thiamine (B-1) tablet 100 mg  100 mg Oral Daily Baljinder Park MD   100 mg at 07/27/25 0818     Labs and Imaging results below reviewed today.  Recent Labs   Lab 07/27/25  0637 07/26/25 2015 07/26/25  1258   WBC 9.0 9.4 8.3   HGB 9.9* 10.5* 10.4*   HCT 28.8* 30.3* 29.3*   MCV 98 98 96    177 178     7-Day Micro Results       Collected Updated Procedure Result Status      07/26/2025 2124 07/27/2025 1231 Blood Culture Peripheral blood (BC) Arm, Left [[id number]]   Peripheral blood (BC) from Arm, Left    Preliminary result Component Value   Culture No growth after 12 hours  [P]                07/26/2025 2110 07/27/2025 1231 Blood Culture Peripheral blood (BC) Arm, Right [[id number]]   Peripheral blood (BC) from Arm, Right    Preliminary result Component Value   Culture No growth after 12 hours  [P]                      Recent Labs   Lab 07/27/25  1202 07/27/25  0637 07/26/25  2124 07/26/25 2015 07/26/25  1258 07/22/25  0633   * 133* 131*  --  133* 133*   POTASSIUM  --  4.3 4.1   --  4.3 3.8   CHLORIDE  --  98 95*  --  93* 99   CO2  --  14* 15*  --  15* 20*   ANIONGAP  --  21* 21*  --  25* 14   GLC  --  111* 125*  --  127* 127*   BUN  --  59.4* 56.7*  --  51.6* 11.3   CR 4.90* 5.06* 4.85*  --  4.76* 0.49*   GFRESTIMATED 15* 14* 15*  --  15* >90   REX  --  9.0 9.2  --  9.0 9.0   MAG  --   --   --  1.9  --   --    PROTTOTAL  --  7.0 7.7  --   --  7.0   ALBUMIN  --  3.0* 3.3*  --  3.1* 3.0*   BILITOTAL  --  19.5* 20.3*  --  19.1* 14.9*   ALKPHOS  --  326* 340*  --  298* 269*   AST  --  195* 206*  --  208* 136*   ALT  --  82* 92*  --  86* 89*     Recent Results (from the past 24 hours)   Abdomen US, complete    Narrative    EXAM: ULTRASOUND ABDOMEN COMPLETE  LOCATION: [facility]  DATE/TIME: 7/26/2025 10:07 PM CDT    INDICATION: Recent spontaneous bacterial peritonitis. Elevated creatinine, bilirubin and transaminases.  COMPARISON: 7/16/2025 - CT abdomen and pelvis.    TECHNIQUE: Complete abdominal ultrasound.    FINDINGS:    GALLBLADDER: A few echogenic foci without posterior acoustic shadowing are present in the gallbladder. These could represent sludge balls or nonshadowing gallstones. No convincing gallbladder wall thickening or pericholecystic fluid. No focal tenderness   over the gallbladder.    BILE DUCTS: No intra or extrahepatic biliary dilatation. The common duct measures 0.4 cm in diameter.    LIVER: The liver is prominent in size. Normal echogenicity. No visualized masses.    RIGHT KIDNEY: 12.1 cm in length. Normal echogenicity. No intrarenal collecting system dilatation, calculi or masses.     LEFT KIDNEY: 12.1 cm in length. Normal echogenicity. No intrarenal collecting system dilatation, calculi or masses.     SPLEEN: The spleen is mildly enlarged, measuring 16.5 cm in craniocaudal dimension.    PANCREAS: Not well-visualized.    AORTA: Visualized.     INFERIOR VENA CAVA: Visualized.    OTHER: No intraperitoneal free fluid.      Impression    IMPRESSION:   1.  Mild splenomegaly and prominence in size of the liver.  2. A few sludge balls versus nonshadowing gallstones in the gallbladder.  3. Otherwise, unremarkable ultrasound of the upper abdomen.     CT Abdomen Pelvis w/o Contrast    Narrative    EXAM: CT ABDOMEN PELVIS W/O CONTRAST  LOCATION: [facility]  DATE: 7/26/2025    INDICATION: Abd pain  COMPARISON: CT from 7/16/2025. Ultrasound from 7/26/2025.  TECHNIQUE: CT scan of the abdomen and pelvis was performed without IV contrast. Multiplanar reformats were obtained. Dose reduction techniques were used.  CONTRAST: None.    FINDINGS:   LOWER CHEST: Bibasilar atelectasis.    HEPATOBILIARY: Small amount of perihepatic free fluid. Hyperdense content in the gallbladder consistent with stones and/or sludge. There is some generalized pericholecystic edema, though the gallbladder is not distended. There is a mildly nodular surface   contour to the liver which is markedly enlarged, measuring 29 cm craniocaudal.    PANCREAS: Normal.    SPLEEN: The spleen is considerably enlarged, measuring 19 cm anteroposterior.    ADRENAL GLANDS: Normal.    KIDNEYS/BLADDER: No hydronephrosis. Bladder is empty.    BOWEL: Nondistended stomach. Fluid-filled loops of small bowel throughout the abdomen without distinct transition point to suggest obstruction. The appendix is normal. No free air.    LYMPH NODES: There is a mildly enlarged giuliana hepatis lymph node measuring 2.2 x 1.7 cm on image 95 which is stable compared to prior imaging.    VASCULATURE: Normal.    PELVIC ORGANS: Small amount of free fluid.    MUSCULOSKELETAL: Mild body wall edema.      Impression    IMPRESSION:   1.  Severe hepatosplenomegaly. Nodularity of the liver surface contour consistent with cirrhosis and/or fibrosis.    2.  Fluid-filled loops of small bowel throughout the abdomen without high-grade obstruction. No free air demonstrated on today's CT.    3.  Gallstones and/or sludge. Mild pericholecystic  edema likely generalized third spacing given perihepatic fluid and liver disease.

## 2025-07-27 NOTE — H&P
[facility]    History and Physical  Hospitalist       Date of Admission:  7/26/2025    Assessment & Plan   Ricci Brown is a 35 year old male with a past medical history of alcohol use disorder, recent admission for SBP along with pneumoperitoneum suspected secondary to paracentesis status post diagnostic laparoscopic on 7/16 that was negative for any perforation, ileus and acute kidney injury who presents with abnormal labs.    Patient was hospitalized here at [facility] from 7/8/2025- 7/21/2025 for SBP, pneumoperitoneum suspected secondary to paracentesis, secondary ileus, acute kidney injury.  He was treated with IV antibiotics along with slow advancement of his diet.  He was discharged on ciprofloxacin for 5 more days along with 20 mg of oral Lasix daily.  He was to continue on ciprofloxacin 500 mg daily indefinitely for prophylaxis of SBP per gastroenterology.    #Acute renal failure. Anion gap metabolic acidosis. Mild hyponatremia: Patient notes that he has felt quite well since discharge from the hospital.  His energy level has been better.  He denies any fevers or chills.  No abdominal pain.  Denies any nausea or vomiting.  He had loose stools the day he went home but those have resolved.  He has been constipated the last 2 days.  He does report he is passing gas from below.  He has been eating and drinking including Ensure shakes.  He has not been drinking very much water.  He notes that he is not urinating very much though attributes this to not drinking.  He denies any blood in the urine.  He denies any alcohol use since discharge and family corroborates this.  He has used oral diclofenac to manage knee pain since discharge.  He denies any blood in the stool.  Denies any falls at home.  -ED, patient afebrile with heart rate in the 110s, normotensive and saturating okay on room air.  His CBC is unremarkable and without leukocytosis.  His BUNs/creatinine is 51/4.76 (baseline  creatinine 0.50).  Bicarb 15 with anion gap of 25.  His CMP shows bilirubin 19, , ALT 86, alk phos 298 which is up trended from 4 days prior.  His INR is elevated at 2.38.  His lactic acid is within normal limits.  His UA shows blood in the urine without clear indication of infection.  He had an ultrasound of his abdomen that showed no renal obstructive process with mild splenomegaly and hepatomegaly.  Patient was given ceftriaxone along with normal saline.  - Suspect patient's renal failure is due to dehydration in the setting of poor p.o. intake, furosemide along with contribution of NSAIDs taken on discharge.  Though, given his SBP concerned that he may have had episodes of hypotension at home leading to poor renal perfusion.  We will need to rule out obstructive process as well.  - We will check a Rachna and FE urea.  Obtain a CT abdomen pelvis to evaluate for any obstructive process though ultrasound is negative.    -Normal saline at 100 cc/h.  Could consider giving albumin if third spacing becomes an issue.  Again, his blood pressures do seem adequate in the ER.  - We will have nephrology evaluate.    #Recent SBP: Patient received 10 days of ceftriaxone during his recent admission and then was transition to ciprofloxacin on 7/19.  He was discharged with an additional 1 week course of ciprofloxacin.  His cultures were negative from his ascitic fluid along with his blood cultures from his recent admission.  His procalcitonin was trending down.  - Obtaining CT abdomen pelvis without contrast as above.  We will restart ceftriaxone and have gastroenterology evaluate.  Recheck procalcitonin to trend.    #Alcoholic cirrhosis with liver failure. Coagulopathy. secondary to liver failure.  History of alcohol use disorder: Patient has worsening of his LFTs with a rising bilirubin on admission.  He adamantly denies alcohol consumption and he has been with family who also corroborates.  Concerned that patient's  progressive liver failure may be due to SBP as above vs progression of alcohol hepatitis.   -Management of SBP as above.  -Check ammonia level.   -IV vitamin K x 3 doses  -GI consulted  -Daily MELD labs.     DVT Prophylaxis: Pneumatic Compression Devices  Code Status: Full Code  Medically Ready for Discharge: Anticipated in 2-4 Days    Patient's wife and mother updated at bedside.    Baljinder Park MD    Primary Care Physician   Physician No Ref-Primary    Chief Complaint   Abnormal labs    History is obtained from the patient, patient's wife, patient's mom, patient's chart and discussed with ER physician    History of Present Illness   Ricci Brown is a 35 year old male with a past medical history of alcohol use disorder, recent admission for SBP along with pneumoperitoneum suspected secondary to paracentesis status post diagnostic laparoscopic on 7/16 that was negative for any perforation, ileus and acute kidney injury who presents with abnormal labs.    Patient was hospitalized here at [facility] from 7/8/2025- 7/21/2025 for SBP, pneumoperitoneum suspected secondary to paracentesis, secondary ileus, acute kidney injury.  He was treated with IV antibiotics along with slow advancement of his diet.  He was discharged on ciprofloxacin for 5 more days along with 20 mg of oral Lasix daily.  He was to continue on ciprofloxacin 500 mg daily indefinitely for prophylaxis of SBP per gastroenterology.    Patient notes that he has felt quite well since discharge from the hospital.  His energy level has been better.  He denies any fevers or chills.  No abdominal pain.  Denies any nausea or vomiting.  He had loose stools the day he went home but those have resolved.  He has been constipated the last 2 days.  He does report he is passing gas from below.  He has been eating and drinking including Ensure shakes.  He has not been drinking very much water.  He notes that he is not urinating very much though attributes this to not  drinking.  He denies any blood in the urine.  He denies any alcohol use since discharge and family corroborates this.  He has used oral diclofenac to manage knee pain since discharge.  He denies any blood in the stool.  Denies any falls at home.    In the ED, patient afebrile with heart rate in the 110s, normotensive and saturating okay on room air.  His CBC is unremarkable and without leukocytosis.  His BUNs/creatinine is 51/4.76 (baseline creatinine 0.50).  Bicarb 15 with anion gap of 25.  His CMP shows bilirubin 19, , ALT 86, alk phos 298 which is up trended from 4 days prior.  His INR is elevated at 2.38.  His lactic acid is within normal limits.  His UA shows blood in the urine without clear indication of infection.  Patient was given ceftriaxone along with normal saline.    Past Medical History    I have reviewed this patient's medical history and updated it with pertinent information if needed.   Alcoholic cirrhosis  Liver failure  Coagulopathy secondary to liver failure  Recent SBP    Past Surgical History   I have reviewed this patient's surgical history and updated it with pertinent information if needed.  Past Surgical History:   Procedure Laterality Date    LAPAROSCOPY DIAGNOSTIC (GENERAL) N/A 7/16/2025    Procedure: LAPAROSCOPY, DIAGNOSTIC;  Surgeon: Gini Cantu MD;  Location: [facility] OR       Prior to Admission Medications   Prior to Admission Medications   Prescriptions Last Dose Informant Patient Reported? Taking?   MILK THISTLE PO   Yes No   Sig: Take 1 tablet by mouth daily.   ciprofloxacin (CIPRO) 500 MG tablet   No No   Sig: Take 1 tablet (500 mg) by mouth every 12 hours for 5 days.   cyclobenzaprine (FLEXERIL) 5 MG tablet   No No   Sig: Take 1 tablet (5 mg) by mouth every 8 hours as needed for muscle spasms.   diclofenac (VOLTAREN) 1 % topical gel   No No   Sig: Apply 4 g topically 4 times daily.   fexofenadine (ALLEGRA) 60 MG tablet   No No   Sig: Take 1 tablet (60 mg) by mouth daily as  needed for allergies.   folic acid (FOLVITE) 1 MG tablet   No No   Sig: Take 1 tablet (1 mg) by mouth daily.   furosemide (LASIX) 20 MG tablet   No No   Sig: Take 1 tablet (20 mg) by mouth daily for 6 days.   multivitamin, therapeutic (THERA-VIT) TABS tablet   Yes No   Sig: Take 1 tablet by mouth daily.   thiamine (B-1) 100 MG tablet   No No   Sig: Take 1 tablet (100 mg) by mouth daily.      Facility-Administered Medications: None     Allergies   No Known Allergies    Social History   I have reviewed this patient's social history and updated it with pertinent information if needed. Ricci Brown    Patient lives with his wife and mother has been helping at home.  Non-smoker.  He denies any alcohol use since his recent admission.    Family History   I have reviewed this patient's family history and updated it with pertinent information if needed.   No family history on file.    Review of Systems   The 10 point Review of Systems is negative other than noted in the HPI or here.     Physical Exam   Temp: 98.3  F (36.8  C) Temp src: Temporal BP: 127/78 Pulse: 115   Resp: 20 SpO2: 100 % O2 Device: None (Room air)    Vital Signs with Ranges  Temp:  [98.3  F (36.8  C)] 98.3  F (36.8  C)  Pulse:  [115] 115  Resp:  [20] 20  BP: (127)/(78) 127/78  SpO2:  [100 %] 100 %  239 lbs 10.24 oz    Constitutional: Patient is jaundiced.  He answers appropriately.  No distress.  HEENT: Normocephalic, mucous membranes are dry.  He has scleral icterus.  No elevation of JVD.  Respiratory: Nl WOB, Clear bilaterally, No wheezes, no crackles  Cardiovascular: Tachycardic, regular, no murmur  GI: Distended.  No appreciable fluid on physical exam.  Bowel sounds hypoactive but present.  He does not have tenderness to palpation.  Lymph/Hematologic: No bruising. No cervical LAD  Skin: Jaundiced with some scattered hemangiomas noted.  Musculoskeletal: Nl Tone, No edema noted  Neurologic: A&Ox3, Answers appropriately. CNII-XII intact. Moves all  extremities. No tremor  Psychiatric: Calm    Data   Data reviewed today:  I personally reviewed   Recent Labs   Lab 07/26/25 2015 07/26/25  1258 07/22/25  0633 07/21/25 2058 07/21/25  0558   WBC 9.4 8.3  --   --   --    HGB 10.5* 10.4*  --   --   --    MCV 98 96  --   --   --     178  --   --   --    INR 2.38*  --   --   --  2.16*   NA  --  133* 133*  --  130*   POTASSIUM  --  4.3 3.8 3.6 3.3*   CHLORIDE  --  93* 99  --  97*   CO2  --  15* 20*  --  21*   BUN  --  51.6* 11.3  --  12.3   CR  --  4.76* 0.49*  --  0.39*   ANIONGAP  --  25* 14  --  12   REX  --  9.0 9.0  --  9.2   GLC  --  127* 127*  --  133*   ALBUMIN  --  3.1* 3.0*  --  3.0*   PROTTOTAL  --   --  7.0  --  7.1   BILITOTAL  --  19.1* 14.9*  --  14.7*   ALKPHOS  --  298* 269*  --  268*   ALT  --  86* 89*  --  89*   AST  --  208* 136*  --  136*       No results found for this or any previous visit (from the past 24 hours).    Clinically Significant Risk Factors Present on Admission         # Hyponatremia: Lowest Na = 133 mmol/L in last 2 days, will monitor as appropriate  # Hypochloremia: Lowest Cl = 93 mmol/L in last 2 days, will monitor as appropriate     # Anion Gap Metabolic Acidosis: Highest Anion Gap = 25 mmol/L in last 2 days, will monitor and treat as appropriate  # Hypoalbuminemia: Lowest albumin = 3.1 g/dL at 7/26/2025 12:58 PM, will monitor as appropriate  # Coagulation Defect: INR = 2.38 (Ref range: 0.85 - 1.15) and/or PTT = 35 Seconds (Ref range: 22 - 38 Seconds), will monitor for bleeding   # Acute Kidney Injury, unspecified: based on a >150% or 0.3 mg/dL increase in last creatinine compared to past 90 day average, will monitor renal function       # Anemia: based on hgb <11       # Obesity: Estimated body mass index is 32.5 kg/m  as calculated from the following:    Height as of this encounter: 1.829 m (6').    Weight as of this encounter: 108.7 kg (239 lb 10.2 oz).       # Financial/Environmental Concerns:

## 2025-07-27 NOTE — PHARMACY-ADMISSION MEDICATION HISTORY
Pharmacist Admission Medication History    Admission medication history is complete. The information provided in this note is only as accurate as the sources available at the time of the update.    Information Source(s): Patient via in-person    Changes made to PTA medication list:  Added: None  Deleted: None  Changed: diclofenac to prn    Allergies reviewed with patient and updates made in EHR: yes    Medication History Completed By: Pedrito Thakkar RPH 7/27/2025 8:12 AM    PTA Med List   Medication Sig Note Last Dose/Taking    ciprofloxacin (CIPRO) 500 MG tablet Take 1 tablet (500 mg) by mouth every 12 hours for 5 days. 7/27/2025: 7/27/25 - pt thinks he has about 3 doses left 7/26/2025 Morning    cyclobenzaprine (FLEXERIL) 5 MG tablet Take 1 tablet (5 mg) by mouth every 8 hours as needed for muscle spasms.  Taking As Needed    fexofenadine (ALLEGRA) 60 MG tablet Take 1 tablet (60 mg) by mouth daily as needed for allergies.  Taking As Needed    folic acid (FOLVITE) 1 MG tablet Take 1 tablet (1 mg) by mouth daily.  7/26/2025 Morning    furosemide (LASIX) 20 MG tablet Take 1 tablet (20 mg) by mouth daily for 6 days. 7/27/2025: 7/27/25 - pt thinks he has one dose left 7/26/2025 Morning    MILK THISTLE PO Take 1 tablet by mouth daily.  7/26/2025 Morning    multivitamin, therapeutic (THERA-VIT) TABS tablet Take 1 tablet by mouth daily.  7/26/2025 Morning    thiamine (B-1) 100 MG tablet Take 1 tablet (100 mg) by mouth daily.  7/26/2025 Morning

## 2025-07-27 NOTE — ED PROVIDER NOTES
Emergency Department Note      History of Present Illness     Chief Complaint   Abnormal Labs      HPI   Ricci Brown is a 35 year old male who presents to the ED for evaluation of worsening laboratory test.  The patient has a history of a recent diagnosis for alcoholic cirrhosis with SBP.  He had ceftriaxone and has 1 more day left of Cipro.  He denies any fevers or abdominal pain.  He says he feels somewhat bloated and constipated but otherwise no new distention.  He had routine laboratory work checked today.  Creatinine is markedly elevated from his more recent baseline and bilirubin is increasing.  The patient remains jaundiced which he feels is approximately the same as when he left the hospital.  He says he has not been drinking alcohol at all.  He has been using diclofenac for joint pain.    Independent Historian   History taken from 2 family members.    Review of External Notes   Discharge summary reviewed from July 21, 2025 and the patient was admitted with SBP and sepsis.    Past Medical History     Medical History and Problem List   Cirrhosis    Medications   ciprofloxacin (CIPRO) 500 MG tablet  cyclobenzaprine (FLEXERIL) 5 MG tablet  diclofenac (VOLTAREN) 1 % topical gel  fexofenadine (ALLEGRA) 60 MG tablet  folic acid (FOLVITE) 1 MG tablet  furosemide (LASIX) 20 MG tablet  MILK THISTLE PO  multivitamin, therapeutic (THERA-VIT) TABS tablet  thiamine (B-1) 100 MG tablet        Surgical History   Past Surgical History:   Procedure Laterality Date    LAPAROSCOPY DIAGNOSTIC (GENERAL) N/A 7/16/2025    Procedure: LAPAROSCOPY, DIAGNOSTIC;  Surgeon: Gini Cantu MD;  Location:  OR       Physical Exam     Patient Vitals for the past 24 hrs:   BP Temp Temp src Pulse Resp SpO2 Height Weight   07/26/25 1922 127/78 98.3  F (36.8  C) Temporal 115 20 100 % 1.829 m (6') 108.7 kg (239 lb 10.2 oz)     Physical Exam  Constitutional:       General: He is not in acute distress.     Appearance: Normal  appearance. He is not toxic-appearing.   HENT:      Head: Atraumatic.      Right Ear: External ear normal.      Left Ear: External ear normal.   Eyes:      General: Scleral icterus present.      Conjunctiva/sclera: Conjunctivae normal.   Cardiovascular:      Rate and Rhythm: Regular rhythm. Tachycardia present.      Heart sounds: Normal heart sounds.   Pulmonary:      Effort: Pulmonary effort is normal. No respiratory distress.      Breath sounds: Normal breath sounds.   Abdominal:      General: There is no distension.      Palpations: Abdomen is soft.      Tenderness: There is no abdominal tenderness. There is no guarding or rebound.   Musculoskeletal:         General: No deformity.      Cervical back: Neck supple.   Skin:     General: Skin is warm.      Capillary Refill: Capillary refill takes less than 2 seconds.      Coloration: Skin is jaundiced.   Neurological:      General: No focal deficit present.      Mental Status: He is alert and oriented to person, place, and time.      Comments: No tremor.   Psychiatric:         Mood and Affect: Mood normal.         Behavior: Behavior normal.           Diagnostics     Lab Results   Labs Ordered and Resulted from Time of ED Arrival to Time of ED Departure   INR - Abnormal       Result Value    INR 2.38 (*)     PT 25.6 (*)    BASIC METABOLIC PANEL (LIMITED OCCURRENCES) - Abnormal    Sodium 131 (*)     Potassium 4.1      Chloride 95 (*)     Carbon Dioxide (CO2) 15 (*)     Anion Gap 21 (*)     Urea Nitrogen 56.7 (*)     Creatinine 4.85 (*)     GFR Estimate 15 (*)     Calcium 9.2      Glucose 125 (*)    ROUTINE UA WITH MICROSCOPIC REFLEX TO CULTURE - Abnormal    Color Urine Dark Yellow (*)     Appearance Urine Slightly Cloudy (*)     Glucose Urine Negative      Bilirubin Urine Large (*)     Ketones Urine Negative      Specific Gravity Urine 1.020      Blood Urine Large (*)     pH Urine 5.0      Protein Albumin Urine 20 (*)     Urobilinogen Urine 2.0 (*)     Nitrite Urine  Negative      Leukocyte Esterase Urine Trace (*)     Bacteria Urine Few (*)     Mucus Urine Present (*)     RBC Urine 129 (*)     WBC Urine 9 (*)     Squamous Epithelials Urine 2 (*)     Hyaline Casts Urine 4 (*)    CBC WITH PLATELETS AND DIFFERENTIAL - Abnormal    WBC Count 9.4      RBC Count 3.10 (*)     Hemoglobin 10.5 (*)     Hematocrit 30.3 (*)     MCV 98      MCH 33.9 (*)     MCHC 34.7      RDW 14.3      Platelet Count 177      % Neutrophils 81      % Lymphocytes 9      % Monocytes 5      % Eosinophils 2      % Basophils 0      % Immature Granulocytes 3      NRBCs per 100 WBC 0      Absolute Neutrophils 7.7      Absolute Lymphocytes 0.8      Absolute Monocytes 0.5      Absolute Eosinophils 0.2      Absolute Basophils 0.0      Absolute Immature Granulocytes 0.3      Absolute NRBCs 0.0     PARTIAL THROMBOPLASTIN TIME - Normal    aPTT 35     LACTIC ACID WHOLE BLOOD WITH 1X REPEAT IN 2 HR WHEN >2 - Normal    Lactic Acid, Initial 1.3     MAGNESIUM (LIMITED OCCURRENCES) - Normal    Magnesium 1.9     ETHANOL LEVEL BLOOD - Normal    Ethanol Level Blood <0.01     HEPATIC FUNCTION PANEL (LIMITED OCCURRENCES)   UREA NITROGEN RANDOM URINE   PROCALCITONIN   AMMONIA   BLOOD CULTURE   BLOOD CULTURE   FRACTIONAL EXCRETION OF SODIUM       Imaging   Abdomen US, complete    (Results Pending)   CT Abdomen Pelvis w/o Contrast    (Results Pending)       EKG   ECG results from 07/26/25   EKG 12-lead, tracing only     Value    Systolic Blood Pressure     Diastolic Blood Pressure     Ventricular Rate 111    Atrial Rate 111    OK Interval 166    QRS Duration 90        QTc 478    P Axis 57    R AXIS -18    T Axis 69    Interpretation ECG      Sinus tachycardia  Possible Left atrial enlargement  Minimal voltage criteria for LVH, may be normal variant ( R in aVL )  Borderline ECG  No previous ECGs available          ED Course      Medications Administered   Medications   sodium chloride 0.9 % infusion ( Intravenous $New Bag 7/26/25  2158)   cefTRIAXone (ROCEPHIN) 2 g vial to attach to  ml bag for ADULTS or NS 50 ml bag for PEDS (has no administration in time range)       Medical Decision Making / Diagnosis     SHAYLEE Brown is a 35 year old male who presents to the ED for new renal failure.  He was recently admitted with cirrhosis.  The etiology is not entirely clear.  Hepatic renal syndrome could be considered.  He has been using diclofenac which may be contributing.  Persistent SBP is a possibility.  He had marked white cells in his prior ascites fluid.  Cultures are negative but he had been on antibiotics already since entirely clear whether he may have persistent SBP he has been taking Cipro at home.  There was no ascites fluid noted on direct ultrasound today to be amenable for repeat paracentesis.  He was covered with ceftriaxone.  He is being hydrated with fluids.  He is overall hemodynamically stable.  Lactate is normal.  His creatinine is not felt to be related to sepsis.    No evidence of severe sepsis.    Patient will be admitted to the hospital service.    Disposition   The patient was admitted to the hospital.     Diagnosis     ICD-10-CM    1. Acute renal failure superimposed on chronic kidney disease, unspecified acute renal failure type, unspecified CKD stage  N17.9     N18.9             Chris Templeton MD  07/26/25 2220

## 2025-07-27 NOTE — CONSULTS
RENAL CONSULTATION NOTE      REFERRING MD:  ISABELLE    REASON FOR CONSULTATION:  Xavier        A/P:     1.  Baseline normal renal function   -discharge creatinine 07.22.25 @ 0.49 mg/dl  2.  EtOHic liver disease   -cirrhosis by imaging   -recent SBP  3.  Hyperbilirubinemia  -progressed post discharge  4.  Acute Kidney Injury  -? oliguric  -CT: no hydro  -NSAIDs + diuretic  -pre renal v ATN  5.  AGMA   6.  Hyponatremia   -presumed hypervolemic  7.  Hypoalbuminemia     No further non steroidals  Emphasized with the patient and his mother at the bedside    No diuretics  25% albumin    Document serum ketones  Await urine studies  Continue IV fluid    Will follow    Typically dialysis not indicated without an avenue to liver transplantation          HPI:     35-year-old male with known history of alcohol use disorder and recent hospitalization for SBP presents to the ED for evaluation of abnormal lab    Discharge creatinine 0.49 mg/dL  Admission creatinine in excess of 5.0 mg/dL  Decreasing urine output by patient admission    History indicates 5 doses of Voltaren taken as an outpatient subsequent to discharge  Ongoing use of furosemide 20 mg daily    Lab review demonstrates modest hyponatremia  Patient with anion gap metabolic acidosis    Up at bedside  Awake alert  Mother present in the room  Affect somewhat flat  Jaundice    ROS:  A complete 10 point review of systems was performed and is negative except as noted above.    PMH:  History reviewed. No pertinent past medical history.    PSH:    Past Surgical History:   Procedure Laterality Date    LAPAROSCOPY DIAGNOSTIC (GENERAL) N/A 7/16/2025    Procedure: LAPAROSCOPY, DIAGNOSTIC;  Surgeon: Gini Cantu MD;  Location:  OR       MEDICATIONS:    No current outpatient medications on file.       ALLERGIES:    Allergies as of 07/26/2025    (No Known Allergies)       FH:  No family history on file.    SH:    Social History     Socioeconomic History    Marital status:       Spouse name: Not on file    Number of children: Not on file    Years of education: Not on file    Highest education level: Not on file   Occupational History    Not on file   Tobacco Use    Smoking status: Not on file    Smokeless tobacco: Not on file   Substance and Sexual Activity    Alcohol use: Not on file    Drug use: Not on file    Sexual activity: Not on file   Other Topics Concern    Not on file   Social History Narrative    Not on file     Social Drivers of Health     Financial Resource Strain: Low Risk  (7/8/2025)    Financial Resource Strain     Within the past 12 months, have you or your family members you live with been unable to get utilities (heat, electricity) when it was really needed?: No   Food Insecurity: Low Risk  (7/8/2025)    Food Insecurity     Within the past 12 months, did you worry that your food would run out before you got money to buy more?: No     Within the past 12 months, did the food you bought just not last and you didn t have money to get more?: No   Transportation Needs: Low Risk  (7/8/2025)    Transportation Needs     Within the past 12 months, has lack of transportation kept you from medical appointments, getting your medicines, non-medical meetings or appointments, work, or from getting things that you need?: No   Physical Activity: Not on file   Stress: Not on file   Social Connections: Unknown (1/1/2022)    [facility]    Social Connections     Frequency of Communication with Friends and Family: Not on file   Interpersonal Safety: Low Risk  (7/8/2025)    Interpersonal Safety     Do you feel physically and emotionally safe where you currently live?: Yes     Within the past 12 months, have you been hit, slapped, kicked or otherwise physically hurt by someone?: No     Within the past 12 months, have you been humiliated or emotionally abused in other ways by your partner or ex-partner?: No   Housing Stability: Low Risk   (7/8/2025)    Housing Stability     Do you have housing? : Yes     Are you worried about losing your housing?: No       PHYSICAL EXAM:      Vitals were reviewed  Patient Vitals for the past 8 hrs:   BP Temp Temp src Pulse Resp SpO2 Weight   07/27/25 0742 112/65 98.1  F (36.7  C) Oral 109 20 96 % --   07/27/25 0625 -- -- -- -- -- -- 107.8 kg (237 lb 11.2 oz)     Patient Vitals for the past 72 hrs:   BP Temp Temp src Pulse Resp SpO2 Height Weight   07/27/25 0742 112/65 98.1  F (36.7  C) Oral 109 20 96 % -- --   07/27/25 0625 -- -- -- -- -- -- -- 107.8 kg (237 lb 11.2 oz)   07/26/25 2346 128/70 97.7  F (36.5  C) Oral 109 20 97 % -- --   07/26/25 2315 115/70 -- -- 110 20 100 % -- --   07/26/25 2215 114/68 -- -- 110 20 98 % -- --   07/26/25 1922 127/78 98.3  F (36.8  C) Temporal 115 20 100 % 1.829 m (6') 108.7 kg (239 lb 10.2 oz)     No intake/output data recorded.      Vitals:    07/26/25 1922 07/27/25 0625   Weight: 108.7 kg (239 lb 10.2 oz) 107.8 kg (237 lb 11.2 oz)         GENERAL: awake, alert, follows  HEENT: NC/AT, PERRLA, EOMI, icteric, pharynx moist without lesion  RESP:  clear anteriorly  CV: RRR, normal S1 S2  ABDOMEN: soft  MS: no clubbing, cyanosis   SKIN: clear without significant rashes or lesions  EXT: warm, no edema      LABS:        Recent Labs   Lab 07/27/25  0637   *   POTASSIUM 4.3   CHLORIDE 98   CO2 14*   ANIONGAP 21*   *   BUN 59.4*   CR 5.06*   GFRESTIMATED 14*   REX 9.0     Recent Labs   Lab 07/27/25  0637 07/26/25  2124 07/26/25  1258 07/22/25  0633 07/21/25  0558   CR 5.06* 4.85* 4.76* 0.49* 0.39*     Recent Labs   Lab 07/27/25 0637 07/26/25 2124   POTASSIUM 4.3 4.1     Recent Labs   Lab 07/27/25  0637 07/26/25 2124 07/26/25  1258 07/22/25  0633   ALBUMIN 3.0* 3.3* 3.1* 3.0*     Recent Labs   Lab 07/27/25 0637 07/26/25 2015 07/26/25  1258   HGB 9.9* 10.5* 10.4*       DIAGNOSTICS:  Terrence Bernal    [facility]  [phone]

## 2025-07-27 NOTE — ED NOTES
[facility]  ED Nurse Handoff Report    ED Chief complaint: Abnormal Labs  . ED Diagnosis:   Final diagnoses:   Acute renal failure superimposed on chronic kidney disease, unspecified acute renal failure type, unspecified CKD stage       Allergies: No Known Allergies    Code Status: Full Code    Activity level - Baseline/Home:  independent.  Activity Level - Current:   standby.   Lift room needed: No.   Bariatric: No   Needed: No   Isolation: No.   Infection: Not Applicable.     Respiratory status: Room air    Vital Signs (within 30 minutes):   Vitals:    07/26/25 1922   BP: 127/78   Pulse: 115   Resp: 20   Temp: 98.3  F (36.8  C)   TempSrc: Temporal   SpO2: 100%   Weight: 108.7 kg (239 lb 10.2 oz)   Height: 1.829 m (6')       Cardiac Rhythm:  ,      Pain level:    Patient confused: No.   Patient Falls Risk: patient and family education.   Elimination Status: Has voided     Patient Report - Initial Complaint: Ricci Brown is a 35 year old male who presents to the ED for evaluation of worsening laboratory test.  The patient has a history of a recent diagnosis for alcoholic cirrhosis with SBP.  He had ceftriaxone and has 1 more day left of Cipro.  He denies any fevers or abdominal pain.  He says he feels somewhat bloated and constipated but otherwise no new distention.  He had routine laboratory work checked today.  Creatinine is markedly elevated from his more recent baseline and bilirubin is increasing.  The patient remains jaundiced which he feels is approximately the same as when he left the hospital.  He says he has not been drinking alcohol at all.  He has been using diclofenac for joint pain. .       Abnormal Results:   Labs Ordered and Resulted from Time of ED Arrival to Time of ED Departure   INR - Abnormal       Result Value    INR 2.38 (*)     PT 25.6 (*)    CBC WITH PLATELETS AND DIFFERENTIAL - Abnormal    WBC Count 9.4      RBC Count 3.10 (*)     Hemoglobin 10.5  (*)     Hematocrit 30.3 (*)     MCV 98      MCH 33.9 (*)     MCHC 34.7      RDW 14.3      Platelet Count 177      % Neutrophils 81      % Lymphocytes 9      % Monocytes 5      % Eosinophils 2      % Basophils 0      % Immature Granulocytes 3      NRBCs per 100 WBC 0      Absolute Neutrophils 7.7      Absolute Lymphocytes 0.8      Absolute Monocytes 0.5      Absolute Eosinophils 0.2      Absolute Basophils 0.0      Absolute Immature Granulocytes 0.3      Absolute NRBCs 0.0     PARTIAL THROMBOPLASTIN TIME - Normal    aPTT 35     LACTIC ACID WHOLE BLOOD WITH 1X REPEAT IN 2 HR WHEN >2 - Normal    Lactic Acid, Initial 1.3     MAGNESIUM (LIMITED OCCURRENCES) - Normal    Magnesium 1.9     ETHANOL LEVEL BLOOD - Normal    Ethanol Level Blood <0.01     BASIC METABOLIC PANEL (LIMITED OCCURRENCES)   HEPATIC FUNCTION PANEL (LIMITED OCCURRENCES)   ROUTINE UA WITH MICROSCOPIC REFLEX TO CULTURE   BLOOD CULTURE   BLOOD CULTURE        Abdomen US, complete    (Results Pending)       Treatments provided: See MAR  Family Comments: At bedside  OBS brochure/video discussed/provided to patient:  Yes  ED Medications:   Medications   sodium chloride 0.9 % infusion (has no administration in time range)       Drips infusing:  Yes  For the majority of the shift this patient was Green.   Interventions performed were No behaviors noted.    Sepsis treatment initiated: No    Cares/treatment/interventions/medications to be completed following ED care: Admit for monitoring of LFT and patient status.    ED Nurse Name: Cydney Perez RN  9:49 PM      RECEIVING UNIT ED HANDOFF REVIEW    Above ED Nurse Handoff Report was reviewed: Yes  Reviewed by: Maria Esther Peterson RN on July 26, 2025 at 11:25 PM   JOSÉ Kolb called the ED to inform them the note was read: Yes

## 2025-07-27 NOTE — CONSULTS
[facility] GASTROENTEROLOGY CONSULTATION     Ricci Brown  [address]  [place] [facility] [address]  35 year old male    Admission Date/Time: 7/26/2025  Primary Care Provider: No Ref-Primary, Physician    We were asked to see the patient in consultation by Dr. Parker for evaluation of EtOH hepatitis.        HPI:  Ricci Brown is a 35 year old male who presented to [facility] 7/26/25 due to worsening of LFTs. He was recently admitted 7/8-7/21/25 for EtOH hepatitis and SBP.  Last drink of EtOH prior to that admission around 7/7/25.  During that admission pneumoperitoneum noted on imaging though likely secondary to paracentesis.     Creatinine worsened dramatically since discharge. Was 0.49 on 7/22, now 4.76.    He has outpatient follow liver up scheduled Thursday of this week at [facility].    He reports feeling well. Was at [place] yesterday. Thinks he might not have been drinking enough water.  No pain.    ROS: A comprehensive ten point review of systems was negative aside from those in mentioned in the HPI.      MEDICATIONS:   Reviewed.    ALLERGIES: No Known Allergies    History reviewed. No pertinent past medical history.    Past Surgical History:   Procedure Laterality Date    LAPAROSCOPY DIAGNOSTIC (GENERAL) N/A 7/16/2025    Procedure: LAPAROSCOPY, DIAGNOSTIC;  Surgeon: Gini Cantu MD;  Location:  OR         SOCIAL HISTORY:   Former etOH, last drink 7/7/25    FAMILY HISTORY:  Non contributory    PHYSICAL EXAM:   /65 (BP Location: Right arm)   Pulse 109   Temp 98.1  F (36.7  C) (Oral)   Resp 20   Ht 1.829 m (6')   Wt 107.8 kg (237 lb 11.2 oz)   SpO2 96%   BMI 32.24 kg/m      Constitutional: NAD, comfortable  Cardiovascular: RRR  Respiratory: CTAB  Psychiatric: mentation appears normal and affect normal/bright  Head: Normocephalic. Atraumatic.    Neck: Neck supple. No adenopathy. Thyroid symmetric, normal size, trachea midline  Eyes:  PERRL, no icterus  ENT: Neck without nodes,  hearing adequate, pharynx normal without erythema or exudate  Abdomen: soft, nontender  NEURO: grossly negative  SKIN: jaundice            ADDITIONAL COMMENTS:   I reviewed the patient's new clinical lab test results.   Recent Labs   Lab Test 07/27/25 0637 07/26/25 2015 07/26/25 1258 07/21/25  0558   WBC 9.0 9.4 8.3  --    HGB 9.9* 10.5* 10.4*  --    MCV 98 98 96  --     177 178  --    INR 2.49* 2.38*  --  2.16*     Recent Labs   Lab Test 07/27/25 0637 07/26/25 2124 07/26/25  1258   * 131* 133*   POTASSIUM 4.3 4.1 4.3   CHLORIDE 98 95* 93*   CO2 14* 15* 15*   BUN 59.4* 56.7* 51.6*   CR 5.06* 4.85* 4.76*   ANIONGAP 21* 21* 25*   REX 9.0 9.2 9.0   * 125* 127*     Recent Labs   Lab Test 07/27/25 0637 07/26/25 2124 07/26/25  1258 07/16/25  0830 07/15/25  1413 07/11/25  0549 07/10/25  1345 07/08/25  1611 07/08/25  1512   ALBUMIN 3.0* 3.3* 3.1*   < >  --    < >  --    < > 4.4   BILITOTAL 19.5* 20.3* 19.1*   < >  --    < >  --    < > 5.9*   ALT 82* 92* 86*   < >  --    < >  --    < > 64   * 206* 208*   < >  --    < >  --    < > 231*   ALKPHOS 326* 340* 298*   < >  --    < >  --    < > 286*   PROTEIN  --  20*  --   --  20*  --  30*   < >  --    LIPASE  --   --   --   --   --   --   --   --  38    < > = values in this interval not displayed.       CT 7/26/25 no contrast:  1.  Severe hepatosplenomegaly. Nodularity of the liver surface contour consistent with cirrhosis and/or fibrosis.  2.  Fluid-filled loops of small bowel throughout the abdomen without high-grade obstruction. No free air demonstrated on today's CT.  3.  Gallstones and/or sludge. Mild pericholecystic edema likely generalized third spacing given perihepatic fluid and liver disease.    US 7/26/25  1. Mild splenomegaly and prominence in size of the liver.  2. A few sludge balls versus nonshadowing gallstones in the gallbladder.  3. Otherwise, unremarkable ultrasound of the upper abdomen.    MELD 3.0: 40 at 7/27/2025  6:37  AM  MELD-Na: 41 at 7/27/2025  6:37 AM  Calculated from:  Serum Creatinine: 5.06 mg/dL (Using max of 3 mg/dL) at 7/27/2025  6:37 AM  Serum Sodium: 133 mmol/L at 7/27/2025  6:37 AM  Total Bilirubin: 19.5 mg/dL at 7/27/2025  6:37 AM  Serum Albumin: 3 g/dL at 7/27/2025  6:37 AM  INR(ratio): 2.49 at 7/27/2025  6:37 AM  Age at listing (hypothetical): 35 years  Sex: Male at 7/27/2025  6:37 AM        CONSULTATION ASSESSMENT AND PLAN:    Principal Problem:  EtOH Hepatitis  Assessment: Admitted 7/8-7/21/25 with EtOH and SBP.  No worsening of bili.  Denies any alcohol use since 7/7/25.  This is likely still the EtOH hepatitis.  He was not started on steroids last admission due to SBP. No obvious ascites on imaging.  Plan:  - Trend MELD labs, check PeTH  - if no improvement in bili tomorrow and no signs of infection, consider starting prednisolone 40mg daily with recheck of Lille score in 7 days  - if no improvement in kidneys, [facility] may need to be contacted to see if patient would be a liver transplant candidate (would effect dialysis if dialysis needed)   - daily thiamine and folic acid  - chem dep counseling      ISABELLE (acute kidney injury)    Assessment: Cr was normal last admission, now     Plan:   - Renal following, urine studies pending  - no diuretics  - no contrast, no NSAIDs    History of SBP  Assessment: treated last admission  Plan:  - cipro 500mg daily for prophylaxis          Britney Kate MD  [facility]  Office:  [phone]  45 minutes of total time was spent providing patient care, including patient evaluation, reviewing documentation/test results, and .

## 2025-07-27 NOTE — PLAN OF CARE
"End of Shift Summary                          [id number]        Pertinent assessments: A&O x 4. VSS RA. ABD slightly distended, prior lap site scabs. Ambulates well w/ cane. Pt jaundice. TELE ST. Pain: denied      Shift Events: New admit to unit. IVF running. Family at bedside.       Plan: urine sample needed w/ repeat labs (that coincide 6 hours of sample). GI and neph consult      Discharge: tbd               Temp: 97.7  F (36.5  C) Temp src: Oral BP: 128/70 Pulse: 109   Resp: 20 SpO2: 97 % O2 Device: None (Room air)         Goal Outcome Evaluation:      Plan of Care Reviewed With: patient    Overall Patient Progress: no changeOverall Patient Progress: no change    Outcome Evaluation: new admit to floor      Problem: Adult Inpatient Plan of Care  Goal: Plan of Care Review  Description: The Plan of Care Review/Shift note should be completed every shift.  The Outcome Evaluation is a brief statement about your assessment that the patient is improving, declining, or no change.  This information will be displayed automatically on your shift  note.  Outcome: Progressing  Flowsheets (Taken 7/27/2025 0436)  Outcome Evaluation: new admit to floor  Plan of Care Reviewed With: patient  Overall Patient Progress: no change  Goal: Patient-Specific Goal (Individualized)  Description: You can add care plan individualizations to a care plan. Examples of Individualization might be:  \"Parent requests to be called daily at 9am for status\", \"I have a hard time hearing out of my right ear\", or \"Do not touch me to wake me up as it startles  me\".  Outcome: Progressing  Goal: Absence of Hospital-Acquired Illness or Injury  Outcome: Progressing  Intervention: Identify and Manage Fall Risk  Recent Flowsheet Documentation  Taken 7/27/2025 0433 by Maria Esther Peterson, RN  Safety Promotion/Fall Prevention: safety round/check completed  Taken 7/27/2025 0212 by Maria Esther Peterson, RN  Safety Promotion/Fall Prevention: safety round/check completed  Taken " 7/27/2025 0137 by Maria Esther Peterson, RN  Safety Promotion/Fall Prevention: safety round/check completed  Taken 7/27/2025 0009 by Maria Esther Peterson RN  Safety Promotion/Fall Prevention: activity supervised  Taken 7/26/2025 2346 by Maria Esther Peterson, RN  Safety Promotion/Fall Prevention: (ADMIT TO FLOOR) safety round/check completed  Intervention: Prevent Skin Injury  Recent Flowsheet Documentation  Taken 7/27/2025 0009 by Maria Esther Peterson RN  Body Position: position changed independently  Goal: Optimal Comfort and Wellbeing  Outcome: Progressing  Goal: Readiness for Transition of Care  Outcome: Progressing  Intervention: Mutually Develop Transition Plan  Recent Flowsheet Documentation  Taken 7/27/2025 0000 by Maria Esther Peterson RN  Equipment Currently Used at Home: cane, straight

## 2025-07-28 LAB
ALBUMIN SERPL BCG-MCNC: 3.3 G/DL (ref 3.5–5.2)
ALBUMIN SERPL BCG-MCNC: 3.4 G/DL (ref 3.5–5.2)
ALP SERPL-CCNC: 299 U/L (ref 40–150)
ALT SERPL W P-5'-P-CCNC: 68 U/L (ref 0–70)
ANION GAP SERPL CALCULATED.3IONS-SCNC: 20 MMOL/L (ref 7–15)
ANION GAP SERPL CALCULATED.3IONS-SCNC: 22 MMOL/L (ref 7–15)
AST SERPL W P-5'-P-CCNC: 164 U/L (ref 0–45)
ATRIAL RATE - MUSE: 111 BPM
BILIRUB SERPL-MCNC: 20 MG/DL
BUN SERPL-MCNC: 55.2 MG/DL (ref 6–20)
BUN SERPL-MCNC: 55.4 MG/DL (ref 6–20)
CALCIUM SERPL-MCNC: 8.6 MG/DL (ref 8.8–10.4)
CALCIUM SERPL-MCNC: 9 MG/DL (ref 8.8–10.4)
CHLORIDE SERPL-SCNC: 93 MMOL/L (ref 98–107)
CHLORIDE SERPL-SCNC: 94 MMOL/L (ref 98–107)
CREAT SERPL-MCNC: 3.39 MG/DL (ref 0.67–1.17)
CREAT SERPL-MCNC: 3.39 MG/DL (ref 0.67–1.17)
DIASTOLIC BLOOD PRESSURE - MUSE: NORMAL MMHG
EGFRCR SERPLBLD CKD-EPI 2021: 23 ML/MIN/1.73M2
EGFRCR SERPLBLD CKD-EPI 2021: 23 ML/MIN/1.73M2
ERYTHROCYTE [DISTWIDTH] IN BLOOD BY AUTOMATED COUNT: 14.3 % (ref 10–15)
GLUCOSE SERPL-MCNC: 130 MG/DL (ref 70–99)
GLUCOSE SERPL-MCNC: 133 MG/DL (ref 70–99)
HCO3 SERPL-SCNC: 19 MMOL/L (ref 22–29)
HCO3 SERPL-SCNC: 20 MMOL/L (ref 22–29)
HCT VFR BLD AUTO: 25.9 % (ref 40–53)
HGB BLD-MCNC: 9.3 G/DL (ref 13.3–17.7)
INR PPP: 2.12 (ref 0.85–1.15)
INTERPRETATION ECG - MUSE: NORMAL
MCH RBC QN AUTO: 34.1 PG (ref 26.5–33)
MCHC RBC AUTO-ENTMCNC: 35.9 G/DL (ref 31.5–36.5)
MCV RBC AUTO: 95 FL (ref 78–100)
P AXIS - MUSE: 57 DEGREES
PHOSPHATE SERPL-MCNC: 4.4 MG/DL (ref 2.5–4.5)
PLATELET # BLD AUTO: 136 10E3/UL (ref 150–450)
POTASSIUM SERPL-SCNC: 3.5 MMOL/L (ref 3.4–5.3)
POTASSIUM SERPL-SCNC: 3.6 MMOL/L (ref 3.4–5.3)
PR INTERVAL - MUSE: 166 MS
PROT SERPL-MCNC: 6.7 G/DL (ref 6.4–8.3)
PROTHROMBIN TIME: 23.5 SECONDS (ref 11.8–14.8)
QRS DURATION - MUSE: 90 MS
QT - MUSE: 352 MS
QTC - MUSE: 478 MS
R AXIS - MUSE: -18 DEGREES
RBC # BLD AUTO: 2.73 10E6/UL (ref 4.4–5.9)
SODIUM SERPL-SCNC: 134 MMOL/L (ref 135–145)
SODIUM SERPL-SCNC: 134 MMOL/L (ref 135–145)
SYSTOLIC BLOOD PRESSURE - MUSE: NORMAL MMHG
T AXIS - MUSE: 69 DEGREES
VENTRICULAR RATE- MUSE: 111 BPM
WBC # BLD AUTO: 6.6 10E3/UL (ref 4–11)

## 2025-07-28 PROCEDURE — 258N000003 HC RX IP 258 OP 636: Performed by: INTERNAL MEDICINE

## 2025-07-28 PROCEDURE — 84075 ASSAY ALKALINE PHOSPHATASE: CPT | Performed by: HOSPITALIST

## 2025-07-28 PROCEDURE — 85610 PROTHROMBIN TIME: CPT | Performed by: HOSPITALIST

## 2025-07-28 PROCEDURE — 250N000009 HC RX 250: Performed by: INTERNAL MEDICINE

## 2025-07-28 PROCEDURE — 99232 SBSQ HOSP IP/OBS MODERATE 35: CPT | Performed by: STUDENT IN AN ORGANIZED HEALTH CARE EDUCATION/TRAINING PROGRAM

## 2025-07-28 PROCEDURE — P9047 ALBUMIN (HUMAN), 25%, 50ML: HCPCS | Performed by: INTERNAL MEDICINE

## 2025-07-28 PROCEDURE — 85027 COMPLETE CBC AUTOMATED: CPT | Performed by: HOSPITALIST

## 2025-07-28 PROCEDURE — 84100 ASSAY OF PHOSPHORUS: CPT | Performed by: INTERNAL MEDICINE

## 2025-07-28 PROCEDURE — 36415 COLL VENOUS BLD VENIPUNCTURE: CPT | Performed by: HOSPITALIST

## 2025-07-28 PROCEDURE — 99233 SBSQ HOSP IP/OBS HIGH 50: CPT | Performed by: INTERNAL MEDICINE

## 2025-07-28 PROCEDURE — 258N000003 HC RX IP 258 OP 636: Performed by: HOSPITALIST

## 2025-07-28 PROCEDURE — 250N000013 HC RX MED GY IP 250 OP 250 PS 637: Performed by: HOSPITALIST

## 2025-07-28 PROCEDURE — 250N000011 HC RX IP 250 OP 636: Performed by: INTERNAL MEDICINE

## 2025-07-28 PROCEDURE — 250N000011 HC RX IP 250 OP 636: Performed by: HOSPITALIST

## 2025-07-28 PROCEDURE — 120N000001 HC R&B MED SURG/OB

## 2025-07-28 RX ORDER — CIPROFLOXACIN 500 MG/1
500 TABLET, FILM COATED ORAL
Status: DISCONTINUED | OUTPATIENT
Start: 2025-07-29 | End: 2025-07-29 | Stop reason: HOSPADM

## 2025-07-28 RX ADMIN — ALBUMIN HUMAN 25 G: 0.25 SOLUTION INTRAVENOUS at 12:16

## 2025-07-28 RX ADMIN — ALBUMIN HUMAN 25 G: 0.25 SOLUTION INTRAVENOUS at 04:22

## 2025-07-28 RX ADMIN — PHYTONADIONE 10 MG: 10 INJECTION, EMULSION INTRAMUSCULAR; INTRAVENOUS; SUBCUTANEOUS at 08:51

## 2025-07-28 RX ADMIN — ALBUMIN HUMAN 25 G: 0.25 SOLUTION INTRAVENOUS at 21:09

## 2025-07-28 RX ADMIN — THIAMINE HCL TAB 100 MG 100 MG: 100 TAB at 08:50

## 2025-07-28 RX ADMIN — SODIUM BICARBONATE: 84 INJECTION, SOLUTION INTRAVENOUS at 05:00

## 2025-07-28 RX ADMIN — FOLIC ACID 1 MG: 1 TABLET ORAL at 08:50

## 2025-07-28 RX ADMIN — SODIUM BICARBONATE: 84 INJECTION, SOLUTION INTRAVENOUS at 15:25

## 2025-07-28 ASSESSMENT — ACTIVITIES OF DAILY LIVING (ADL)
ADLS_ACUITY_SCORE: 29

## 2025-07-28 NOTE — PLAN OF CARE
"Shift Summary 2300 - 0700  Temp: 98.4  F (36.9  C) Temp src: Oral BP: 122/74 Pulse: 110   Resp: 18 SpO2: 96 % O2 Device: None (Room air)      Pt is alert and oriented; VSS on RA, denies pain. Tele - ST. Reports constipation - senna administered. On IVF sodium bicarb w NS @100mls/hr, IV Ceftriaxone and IV albumin. Jaundiced skin, yellow-brown urine. Independent in the room, steady on his feet. Mom by the bedside at night. On a renal diet w 1500mls fluid restriction. [facility] and Nephrology following.     Problem: Adult Inpatient Plan of Care  Goal: Plan of Care Review  Description: The Plan of Care Review/Shift note should be completed every shift.  The Outcome Evaluation is a brief statement about your assessment that the patient is improving, declining, or no change.  This information will be displayed automatically on your shift  note.  Outcome: Not Progressing  Flowsheets (Taken 7/28/2025 0440)  Outcome Evaluation: Pt is alert and oriented x4, VSS, on RA. On IVABX and albumin. Renal diet, reports constipation - on Senna.  Plan of Care Reviewed With:   patient   parent  Overall Patient Progress: no change     Problem: Electrolyte Imbalance  Goal: Electrolyte Balance  Outcome: Not Progressing  Flowsheets (Taken 7/28/2025 0440)  Electrolyte Imbalance: Plan of Care: electrolyte balance  Intervention: Monitor and Manage Electrolyte Imbalance  Recent Flowsheet Documentation  Taken 7/28/2025 0048 by Shital Strickland RN  Fluid/Electrolyte Management: fluids provided     Problem: Adult Inpatient Plan of Care  Goal: Patient-Specific Goal (Individualized)  Description: You can add care plan individualizations to a care plan. Examples of Individualization might be:  \"Parent requests to be called daily at 9am for status\", \"I have a hard time hearing out of my right ear\", or \"Do not touch me to wake me up as it startles  me\".  Outcome: Progressing  Goal: Absence of Hospital-Acquired Illness or Injury  Outcome: " Progressing  Intervention: Identify and Manage Fall Risk  Recent Flowsheet Documentation  Taken 7/28/2025 0048 by Shital Strickland RN  Safety Promotion/Fall Prevention:   safety round/check completed   room near nurse's station   nonskid shoes/slippers when out of bed   lighting adjusted   clutter free environment maintained   activity supervised  Intervention: Prevent Skin Injury  Recent Flowsheet Documentation  Taken 7/28/2025 0048 by Shital Strickland RN  Skin Protection:   adhesive use limited   tubing/devices free from skin contact  Taken 7/27/2025 2310 by Shital Strickland RN  Body Position: position changed independently  Intervention: Prevent and Manage VTE (Venous Thromboembolism) Risk  Recent Flowsheet Documentation  Taken 7/28/2025 0048 by Shital Strickland RN  VTE Prevention/Management: (Ambulates to bathrom independently) patient refused intervention  Intervention: Prevent Infection  Recent Flowsheet Documentation  Taken 7/28/2025 0048 by Shital Strickland RN  Infection Prevention:   single patient room provided   rest/sleep promoted   hand hygiene promoted  Goal: Optimal Comfort and Wellbeing  Outcome: Progressing  Goal: Readiness for Transition of Care  Outcome: Progressing         Goal Outcome Evaluation:      Plan of Care Reviewed With: patient, parent    Overall Patient Progress: no changeOverall Patient Progress: no change    Outcome Evaluation: Pt is alert and oriented x4, VSS, on RA. On IVABX and albumin. Renal diet, reports constipation - on Senna.

## 2025-07-28 NOTE — PROGRESS NOTES
[facility]    Medicine Progress Note - Hospitalist Service    Date of Admission:  7/26/2025    Assessment & Plan     Ricci Brown is a 35 year old male with a past medical history of alcohol use disorder, recent admission for SBP along with pneumoperitoneum suspected secondary to paracentesis status post diagnostic laparoscopic on 7/16 that was negative for any perforation, ileus and acute kidney injury who presents with abnormal labs.     Patient was hospitalized here at [facility] from 7/8/2025- 7/21/2025 for SBP, pneumoperitoneum suspected secondary to paracentesis, secondary ileus, acute kidney injury.  He was treated with IV antibiotics along with slow advancement of his diet.  He was discharged on ciprofloxacin for 5 more days along with 20 mg of oral Lasix daily.  He was to continue on ciprofloxacin 500 mg daily indefinitely for prophylaxis of SBP per gastroenterology.  After discharge he had poor fluid intake by report.  He was found on recheck to have ARF and was admitted.    Nephrology consulted and following.     Acute renal failure  Anion gap metabolic acidosis  Mild hyponatremia, improving    -Patient notes that he has felt quite well since discharge from the hospital.  His energy level has been better.  He denies any fevers or chills.  No abdominal pain.  Denies any nausea or vomiting though liquid intake poor by report.  He had loose stools the day he went home but those have resolved.  He has used oral diclofenac to manage knee pain since discharge.  Also was on recent lasix.      Suspect patient's renal failure is due to dehydration in the setting of poor p.o. intake, furosemide along with contribution of NSAIDs taken on discharge.     7/28/25 - Nephrology consulted and following (appreciated)  Awaiting am renal panel    Currently on IV albumin q8hr (3 doses given so far) with close monitoring of his urine outpt  Has been on IVF with bicarb per nephrology  Urine output  noted ([id number])      Addendum - 1200    Spoke with nephrology.  Creat improved this am to 3.39 (4.9).  plan to continue with IV albumin and IVF (but at decreased rate). Renal panel in the am    Recent SBP    -Patient received 10 days of ceftriaxone during his recent admission and then was transition to ciprofloxacin on 7/19.  He was discharged with an additional 1 week course of ciprofloxacin.  His cultures were negative from his ascitic fluid along with his blood cultures from his recent admission.  His procalcitonin was trending down.  No definitive new infection.  GI recommended long term cipro.       7/28/25 - will discharge rocephin today and resume po cipro per GI recs    Alcoholic cirrhosis with liver failure  Coagulopathy. secondary to liver failure  History of alcohol use disorder:    -Patient has worsening of his LFTs with a rising bilirubin on admission.  He adamantly denies recent alcohol consumption and he has been with family who also corroborates.      GI consulted and following (appreciated)  Trending MELD score  Considering prednisone  Continue with daily thiamine/folic acid     -IV Vitamin K 10mg x 2 given 7/27/25; ordered for 10mg IV daily at this time  INR daily    Weakness/deconditioning    Encourage up out of bed  Ambulate in the halls  Unsure if PT needed at this point       PPE Used:  Mask, gloves          Diet: Renal Diet (non-dialysis)  Fluid restriction 1500 ML FLUID    DVT Prophylaxis: elevated INR from liver failure  Starr Catheter: Not present  Lines: None     Cardiac Monitoring: ACTIVE order. Indication: Tachyarrhythmias, acute (48 hours)  Code Status: Full Code      Clinically Significant Risk Factors         # Hyponatremia: Lowest Na = 130 mmol/L in last 2 days, will monitor as appropriate  # Hypochloremia: Lowest Cl = 93 mmol/L in last 2 days, will monitor as appropriate     # Anion Gap Metabolic Acidosis: Highest Anion Gap = 25 mmol/L in last 2 days, will monitor and treat as  appropriate  # Hypoalbuminemia: Lowest albumin = 3 g/dL at 7/27/2025  6:37 AM, will monitor as appropriate  # Coagulation Defect: INR = 2.49 (Ref range: 0.85 - 1.15) and/or PTT = 35 Seconds (Ref range: 22 - 38 Seconds), will monitor for bleeding   # Acute Kidney Injury, unspecified: based on a >150% or 0.3 mg/dL increase in last creatinine compared to past 90 day average, will monitor renal function             # Obesity: Estimated body mass index is 32.24 kg/m  as calculated from the following:    Height as of this encounter: 1.829 m (6').    Weight as of this encounter: 107.8 kg (237 lb 11.2 oz)., PRESENT ON ADMISSION     # Financial/Environmental Concerns:           Disposition Plan     Medically Ready for Discharge: Anticipated in 2-4 Days             Beronica Sands DO  Hospitalist Service  [facility]  Securely message with [employer] (more info)  Text page via [facility] Paging/Directory   ______________________________________________________________________    Interval History     Seen with mother at bedside.  Feeling well - has been up to the bathroom to urinate without any lightheadedness or dizziness.  No current HA, CP, SOB or N/V.  Urinating.  Feels that his abd may be a bit distended as he feels that he has to have a BM.    Physical Exam   Vital Signs: Temp: 98.4  F (36.9  C) Temp src: Oral BP: 122/74 Pulse: 110   Resp: 18 SpO2: 96 % O2 Device: None (Room air)    Weight: 237 lbs 11.2 oz    GEN:  Alert, oriented x 3, appears comfortable, no overt respiratory distress.  HEENT:  Normocephalic/atraumatic, + scleral icterus, no nasal discharge  CV:  somewhat distant but regular rate and rhythm, no loud murmur.  S1 + S2 noted,  LUNGS:  Clear to auscultation ant/lat bilaterally without clear rales/rhonchi/wheezing/retractions.  Symmetric chest rise on inhalation noted.  ABD:  + bowel sounds, soft, moderately distended but still soft.  No clear rebound/guarding/rigidity.  EXT:  trace  pretibial edema bilaterally.  No cyanosis.    SKIN:  Dry to touch, no exanthems noted in the visualized areas.    Medical Decision Making       51 MINUTES SPENT BY ME on the date of service doing chart review, history, exam, documentation & further activities per the note.      Data   Medications   Current Facility-Administered Medications   Medication Dose Route Frequency Provider Last Rate Last Admin    sodium bicarbonate 150 mEq in D5W 1,000 mL infusion   Intravenous Continuous PAUL Bernal  mL/hr at 07/28/25 0500 New Bag at 07/28/25 0500     Current Facility-Administered Medications   Medication Dose Route Frequency Provider Last Rate Last Admin    albumin human 25 % injection 25 g  25 g Intravenous Q8H PAUL Bernal MD   25 g at 07/28/25 0422    cefTRIAXone (ROCEPHIN) 2 g vial to attach to  ml bag for ADULTS or NS 50 ml bag for PEDS  2 g Intravenous Q24H Baljinder Park MD   2 g at 07/27/25 2250    [Held by provider] ciprofloxacin (CIPRO) tablet 500 mg  500 mg Oral Q12H Chris Parker DO        folic acid (FOLVITE) tablet 1 mg  1 mg Oral Daily Baljinder Park MD   1 mg at 07/27/25 0818    phytonadione (AQUAMEPHYTON, VITAMIN K) 10 mg in sodium chloride 0.9 % 50 mL intermittent infusion  10 mg Intravenous Daily Baljinder Pakr  mL/hr at 07/27/25 0820 10 mg at 07/27/25 0820    sodium chloride (PF) 0.9% PF flush 3 mL  3 mL Intracatheter Q8H AUGUSTA Baljinder Park MD   3 mL at 07/28/25 0611    thiamine (B-1) tablet 100 mg  100 mg Oral Daily Baljinder Park MD   100 mg at 07/27/25 0818     Labs and Imaging results below reviewed today.  Recent Labs   Lab 07/28/25  0818 07/27/25  0637 07/26/25 2015   WBC 6.6 9.0 9.4   HGB 9.3* 9.9* 10.5*   HCT 25.9* 28.8* 30.3*   MCV 95 98 98   * 167 177     Recent Labs   Lab 07/28/25  0818 07/27/25  1202 07/27/25  0637 07/26/25 2124   *  134* 130* 133* 131*   POTASSIUM 3.6  3.5  --  4.3 4.1   CHLORIDE 93*  94*  --  98 95*   CO2 19*  20*  --  14* 15*    ANIONGAP 22*  20*  --  21* 21*   *  133*  --  111* 125*   BUN 55.2*  55.4*  --  59.4* 56.7*   CR 3.39*  3.39* 4.90* 5.06* 4.85*   GFRESTIMATED 23*  23* 15* 14* 15*   REX 9.0  8.6*  --  9.0 9.2     Recent Labs   Lab 07/28/25  0818 07/27/25  1202 07/27/25 0637 07/26/25 2124 07/26/25 2015   *  134* 130* 133* 131*  --    POTASSIUM 3.6  3.5  --  4.3 4.1  --    CHLORIDE 93*  94*  --  98 95*  --    CO2 19*  20*  --  14* 15*  --    ANIONGAP 22*  20*  --  21* 21*  --    *  133*  --  111* 125*  --    BUN 55.2*  55.4*  --  59.4* 56.7*  --    CR 3.39*  3.39* 4.90* 5.06* 4.85*  --    GFRESTIMATED 23*  23* 15* 14* 15*  --    REX 9.0  8.6*  --  9.0 9.2  --    MAG  --   --   --   --  1.9   PHOS 4.4  --   --   --   --    PROTTOTAL 6.7  --  7.0 7.7  --    ALBUMIN 3.3*  3.4*  --  3.0* 3.3*  --    BILITOTAL 20.0*  --  19.5* 20.3*  --    ALKPHOS 299*  --  326* 340*  --    *  --  195* 206*  --    ALT 68  --  82* 92*  --      Recent Labs   Lab 07/28/25  0818 07/27/25  0637 07/26/25 2015   INR 2.12* 2.49* 2.38*       No results found for this or any previous visit (from the past 24 hours).

## 2025-07-28 NOTE — PROGRESS NOTES
Renal Medicine Progress Note            Assessment/Plan:     Assessment:    ISABELLE   Baseline Cr normal ~0.5. Peak Cr 5.06. ISABELLE likely ATN related to possible NSAIDs, diuretic use and some hypovolemia. Responded well to IVF. Urine Na 40, less likely HRS (though this is on lasix).   - hold lasix   - no NSAIDs  - reduced IVF as below   - daily BMP     AGMA   Improved with IVF.     EtOH Cirrhosis   EtOH hepatitis   GI following.     Plan/Recs:  - discontinue bicarb fluids   - continue albumin for total 6 doses       Discussed with Dr. Sands. Reviewed notes from Dr. Parker/Wicho (hospitalists).     Dionisio Quesada MD   [facility]  Office: [phone]          Interval History:      No acute events overnight   Cr improving   UOP adequate, 500 ml yesterday, 1.26L so far today   He reports feeling ok   Appetite good   No n/v   Abd distended but he doesn't feel like it's different than yesterday   Denies SOB   Updated patient's mother at bedside.            Medications and Allergies:     Current Facility-Administered Medications   Medication Dose Route Frequency Provider Last Rate Last Admin    albumin human 25 % injection 25 g  25 g Intravenous Q8H PAUL Bernal MD   25 g at 07/28/25 1216    [START ON 7/29/2025] ciprofloxacin (CIPRO) tablet 500 mg  500 mg Oral Q24H Beronica Looney,         folic acid (FOLVITE) tablet 1 mg  1 mg Oral Daily Baljinder Park MD   1 mg at 07/28/25 0850    sodium chloride (PF) 0.9% PF flush 3 mL  3 mL Intracatheter Q8H Baljinder Bradley MD   3 mL at 07/28/25 0611    thiamine (B-1) tablet 100 mg  100 mg Oral Daily Baljinder Park MD   100 mg at 07/28/25 0850      No Known Allergies         Physical Exam:   Vitals were reviewed  /72 (BP Location: Right arm)   Pulse 105   Temp 98.9  F (37.2  C) (Oral)   Resp 20   Ht 1.829 m (6')   Wt 107.8 kg (237 lb 11.2 oz)   SpO2 97%   BMI 32.24 kg/m      Wt Readings from Last 3 Encounters:   07/27/25  107.8 kg (237 lb 11.2 oz)   07/21/25 105.7 kg (233 lb)   10/28/24 103.4 kg (228 lb)       Intake/Output Summary (Last 24 hours) at 7/28/2025 1637  Last data filed at 7/28/2025 1526  Gross per 24 hour   Intake 3010 ml   Output 1560 ml   Net 1450 ml       GENERAL APPEARANCE: NAD, jaundice  HEENT: normocephalic, MMM, scleral icterus  RESP: clear  CV: RRR   ABDOMEN: distended, firm, nontender  EXTREMITIES/SKIN: 2+ edema  NEURO:  alert, oriented, normal speech            Data:     BMP  Recent Labs   Lab 07/28/25  0818 07/27/25  1202 07/27/25  0637 07/26/25 2124 07/26/25  1258   *  134* 130* 133* 131* 133*   POTASSIUM 3.6  3.5  --  4.3 4.1 4.3   CHLORIDE 93*  94*  --  98 95* 93*   REX 9.0  8.6*  --  9.0 9.2 9.0   CO2 19*  20*  --  14* 15* 15*   BUN 55.2*  55.4*  --  59.4* 56.7* 51.6*   CR 3.39*  3.39* 4.90* 5.06* 4.85* 4.76*   *  133*  --  111* 125* 127*     CBC  Recent Labs   Lab 07/28/25  0818 07/27/25  0637 07/26/25 2015 07/26/25  1258   WBC 6.6 9.0 9.4 8.3   HGB 9.3* 9.9* 10.5* 10.4*   HCT 25.9* 28.8* 30.3* 29.3*   MCV 95 98 98 96   * 167 177 178     Lab Results   Component Value Date     (H) 07/28/2025    ALT 68 07/28/2025    ALKPHOS 299 (H) 07/28/2025    BILITOTAL 20.0 (HH) 07/28/2025    KIRILL 86 (H) 07/27/2025     Lab Results   Component Value Date    INR 2.12 (H) 07/28/2025     Color Urine (no units)   Date Value   07/26/2025 Dark Yellow (A)     Appearance Urine (no units)   Date Value   07/26/2025 Slightly Cloudy (A)     Glucose Urine (mg/dL)   Date Value   07/26/2025 Negative     Bilirubin Urine (no units)   Date Value   07/26/2025 Large (A)     Ketones Urine (mg/dL)   Date Value   07/26/2025 Negative     Specific Gravity Urine (no units)   Date Value   07/26/2025 1.020     pH Urine (no units)   Date Value   07/26/2025 5.0     Protein Albumin Urine (mg/dL)   Date Value   07/26/2025 20 (A)     Nitrite Urine (no units)   Date Value   07/26/2025 Negative     Leukocyte Esterase  Urine (no units)   Date Value   07/26/2025 Trace (A)         Attestation:  I have reviewed today's vital signs, notes, medications, labs and imaging.    Dionisio Quesada MD  [facility] - Nephrology  Office: [phone]

## 2025-07-28 NOTE — PLAN OF CARE
"Temp: 98.9  F (37.2  C) Temp src: Oral BP: 125/72 Pulse: 105   Resp: 20 SpO2: 97 % O2 Device: None (Room air)      A&Ox4, denies pain, 2 PIV-SL, sodium bicarb infusion dc'd on shift due to bloatng. Tele-ST. Independent in room. Renal diet and 1500 FR maintained.Trace edema BLE. Albumin infusions periodically. Restarted on Cipro. [facility] following. Chem dep consult. Discharge TBD.      Goal Outcome Evaluation:      Plan of Care Reviewed With: patient    Overall Patient Progress: no changeOverall Patient Progress: no change    Outcome Evaluation: A&Ox4, electrolytes better today. Denies pain, SOB, N/V, CP        Problem: Adult Inpatient Plan of Care  Goal: Plan of Care Review  Description: The Plan of Care Review/Shift note should be completed every shift.  The Outcome Evaluation is a brief statement about your assessment that the patient is improving, declining, or no change.  This information will be displayed automatically on your shift  note.  Outcome: Progressing  Flowsheets (Taken 7/28/2025 1831)  Outcome Evaluation: A&Ox4, electrolytes better today. Denies pain, SOB, N/V, CP  Plan of Care Reviewed With: patient  Overall Patient Progress: no change  Goal: Patient-Specific Goal (Individualized)  Description: You can add care plan individualizations to a care plan. Examples of Individualization might be:  \"Parent requests to be called daily at 9am for status\", \"I have a hard time hearing out of my right ear\", or \"Do not touch me to wake me up as it startles  me\".  Outcome: Progressing  Goal: Absence of Hospital-Acquired Illness or Injury  Outcome: Progressing  Intervention: Identify and Manage Fall Risk  Recent Flowsheet Documentation  Taken 7/28/2025 1827 by Javid Doherty, RN  Safety Promotion/Fall Prevention: safety round/check completed  Taken 7/28/2025 0859 by Javid Doherty, RN  Safety Promotion/Fall Prevention: safety round/check completed  Intervention: Prevent Skin Injury  Recent Flowsheet " Documentation  Taken 7/28/2025 1827 by Javid Doherty, RN  Body Position: position changed independently  Taken 7/28/2025 0859 by Javid Doherty RN  Body Position: position changed independently  Intervention: Prevent and Manage VTE (Venous Thromboembolism) Risk  Recent Flowsheet Documentation  Taken 7/28/2025 1827 by Javid Doherty, RN  VTE Prevention/Management: patient refused intervention  Taken 7/28/2025 0859 by Javid Doherty RN  VTE Prevention/Management: patient refused intervention  Goal: Optimal Comfort and Wellbeing  Outcome: Progressing  Goal: Readiness for Transition of Care  Outcome: Progressing     Problem: Electrolyte Imbalance  Goal: Electrolyte Balance  Outcome: Progressing

## 2025-07-28 NOTE — PLAN OF CARE
Goal Outcome Evaluation:      Plan of Care Reviewed With: patient, parent    Overall Patient Progress: no changeOverall Patient Progress: no change    Outcome Evaluation: RN(Shift [id number])VSS. A&OX4. Denies pain. PRN senna given per request as last regular BM was a few days ago. Urine brown, scleral icterus, jaundice skin. Mom at bedside. Continue current POC.      Problem: Adult Inpatient Plan of Care  Goal: Plan of Care Review  Description: The Plan of Care Review/Shift note should be completed every shift.  The Outcome Evaluation is a brief statement about your assessment that the patient is improving, declining, or no change.  This information will be displayed automatically on your shift  note.  Outcome: Not Progressing  Flowsheets (Taken 7/27/2025 2302)  Outcome Evaluation: RN(Shift [id number])VSS. A&OX4. Denies pain. PRN senna given per request as last regular BM was a few days ago. Urine brown, scleral icterus, jaundice skin. Mom at bedside. Continue current POC.  Plan of Care Reviewed With:   patient   parent  Overall Patient Progress: no change  Goal: Absence of Hospital-Acquired Illness or Injury  Intervention: Identify and Manage Fall Risk  Recent Flowsheet Documentation  Taken 7/27/2025 1955 by Britney Chaney RN  Safety Promotion/Fall Prevention:   nonskid shoes/slippers when out of bed   room near nurse's station   safety round/check completed  Intervention: Prevent Skin Injury  Recent Flowsheet Documentation  Taken 7/27/2025 1955 by Britney Chaney RN  Body Position: position changed independently  Intervention: Prevent and Manage VTE (Venous Thromboembolism) Risk  Recent Flowsheet Documentation  Taken 7/27/2025 1955 by Britney Chaney RN  VTE Prevention/Management: patient refused intervention  Intervention: Prevent Infection  Recent Flowsheet Documentation  Taken 7/27/2025 1955 by Britney Chaney RN  Infection Prevention:   hand hygiene promoted   rest/sleep promoted    single patient room provided

## 2025-07-29 VITALS
TEMPERATURE: 98.6 F | RESPIRATION RATE: 18 BRPM | DIASTOLIC BLOOD PRESSURE: 73 MMHG | SYSTOLIC BLOOD PRESSURE: 132 MMHG | HEART RATE: 109 BPM | WEIGHT: 242.2 LBS | HEIGHT: 72 IN | BODY MASS INDEX: 32.8 KG/M2 | OXYGEN SATURATION: 96 %

## 2025-07-29 LAB
ALBUMIN SERPL BCG-MCNC: 3.4 G/DL (ref 3.5–5.2)
ALP SERPL-CCNC: 267 U/L (ref 40–150)
ALT SERPL W P-5'-P-CCNC: 62 U/L (ref 0–70)
ANION GAP SERPL CALCULATED.3IONS-SCNC: 17 MMOL/L (ref 7–15)
AST SERPL W P-5'-P-CCNC: 182 U/L (ref 0–45)
BILIRUB DIRECT SERPL-MCNC: 13.07 MG/DL (ref 0–0.3)
BILIRUB SERPL-MCNC: 20 MG/DL
BUN SERPL-MCNC: 39.5 MG/DL (ref 6–20)
CALCIUM SERPL-MCNC: 9.3 MG/DL (ref 8.8–10.4)
CHLORIDE SERPL-SCNC: 96 MMOL/L (ref 98–107)
CREAT SERPL-MCNC: 1.87 MG/DL (ref 0.67–1.17)
EGFRCR SERPLBLD CKD-EPI 2021: 47 ML/MIN/1.73M2
ERYTHROCYTE [DISTWIDTH] IN BLOOD BY AUTOMATED COUNT: 14.3 % (ref 10–15)
GLUCOSE SERPL-MCNC: 110 MG/DL (ref 70–99)
HCO3 SERPL-SCNC: 22 MMOL/L (ref 22–29)
HCT VFR BLD AUTO: 25.4 % (ref 40–53)
HGB BLD-MCNC: 9 G/DL (ref 13.3–17.7)
INR PPP: 2.06 (ref 0.85–1.15)
MCH RBC QN AUTO: 34 PG (ref 26.5–33)
MCHC RBC AUTO-ENTMCNC: 35.4 G/DL (ref 31.5–36.5)
MCV RBC AUTO: 96 FL (ref 78–100)
PHOSPHATE SERPL-MCNC: 3.2 MG/DL (ref 2.5–4.5)
PLATELET # BLD AUTO: 129 10E3/UL (ref 150–450)
POTASSIUM SERPL-SCNC: 3.8 MMOL/L (ref 3.4–5.3)
PROT SERPL-MCNC: 6.8 G/DL (ref 6.4–8.3)
PROTHROMBIN TIME: 23 SECONDS (ref 11.8–14.8)
RBC # BLD AUTO: 2.65 10E6/UL (ref 4.4–5.9)
SODIUM SERPL-SCNC: 135 MMOL/L (ref 135–145)
WBC # BLD AUTO: 6.1 10E3/UL (ref 4–11)

## 2025-07-29 PROCEDURE — 99232 SBSQ HOSP IP/OBS MODERATE 35: CPT | Performed by: STUDENT IN AN ORGANIZED HEALTH CARE EDUCATION/TRAINING PROGRAM

## 2025-07-29 PROCEDURE — 36415 COLL VENOUS BLD VENIPUNCTURE: CPT | Performed by: HOSPITALIST

## 2025-07-29 PROCEDURE — 84450 TRANSFERASE (AST) (SGOT): CPT | Performed by: INTERNAL MEDICINE

## 2025-07-29 PROCEDURE — P9047 ALBUMIN (HUMAN), 25%, 50ML: HCPCS | Performed by: INTERNAL MEDICINE

## 2025-07-29 PROCEDURE — 85610 PROTHROMBIN TIME: CPT | Performed by: HOSPITALIST

## 2025-07-29 PROCEDURE — 250N000011 HC RX IP 250 OP 636: Performed by: INTERNAL MEDICINE

## 2025-07-29 PROCEDURE — 250N000012 HC RX MED GY IP 250 OP 636 PS 637: Performed by: PHYSICIAN ASSISTANT

## 2025-07-29 PROCEDURE — 84460 ALANINE AMINO (ALT) (SGPT): CPT | Performed by: INTERNAL MEDICINE

## 2025-07-29 PROCEDURE — 250N000013 HC RX MED GY IP 250 OP 250 PS 637: Performed by: HOSPITALIST

## 2025-07-29 PROCEDURE — 250N000013 HC RX MED GY IP 250 OP 250 PS 637: Performed by: INTERNAL MEDICINE

## 2025-07-29 PROCEDURE — 99239 HOSP IP/OBS DSCHRG MGMT >30: CPT | Performed by: STUDENT IN AN ORGANIZED HEALTH CARE EDUCATION/TRAINING PROGRAM

## 2025-07-29 PROCEDURE — 84075 ASSAY ALKALINE PHOSPHATASE: CPT | Performed by: INTERNAL MEDICINE

## 2025-07-29 PROCEDURE — 85018 HEMOGLOBIN: CPT | Performed by: HOSPITALIST

## 2025-07-29 PROCEDURE — 82247 BILIRUBIN TOTAL: CPT | Performed by: INTERNAL MEDICINE

## 2025-07-29 PROCEDURE — 82248 BILIRUBIN DIRECT: CPT | Performed by: INTERNAL MEDICINE

## 2025-07-29 PROCEDURE — 80069 RENAL FUNCTION PANEL: CPT | Performed by: INTERNAL MEDICINE

## 2025-07-29 RX ORDER — CIPROFLOXACIN 500 MG/1
500 TABLET, FILM COATED ORAL EVERY 24 HOURS
Qty: 28 TABLET | Refills: 0 | Status: SHIPPED | OUTPATIENT
Start: 2025-07-30

## 2025-07-29 RX ORDER — PREDNISOLONE SODIUM PHOSPHATE 10 MG/1
40 TABLET, ORALLY DISINTEGRATING ORAL DAILY
Qty: 28 TABLET | Refills: 0 | Status: SHIPPED | OUTPATIENT
Start: 2025-07-29 | End: 2025-07-29

## 2025-07-29 RX ORDER — PREDNISOLONE SODIUM PHOSPHATE 15 MG/5ML
40 SOLUTION ORAL DAILY
Status: DISCONTINUED | OUTPATIENT
Start: 2025-07-29 | End: 2025-07-29 | Stop reason: HOSPADM

## 2025-07-29 RX ORDER — PREDNISOLONE SODIUM PHOSPHATE 10 MG/1
40 TABLET, ORALLY DISINTEGRATING ORAL DAILY
Qty: 28 TABLET | Refills: 0 | Status: SHIPPED | OUTPATIENT
Start: 2025-07-29

## 2025-07-29 RX ADMIN — THIAMINE HCL TAB 100 MG 100 MG: 100 TAB at 09:02

## 2025-07-29 RX ADMIN — FOLIC ACID 1 MG: 1 TABLET ORAL at 09:01

## 2025-07-29 RX ADMIN — PREDNISOLONE SODIUM PHOSPHATE 40 MG: 15 SOLUTION ORAL at 12:00

## 2025-07-29 RX ADMIN — ALBUMIN HUMAN 25 G: 0.25 SOLUTION INTRAVENOUS at 04:14

## 2025-07-29 RX ADMIN — CIPROFLOXACIN 500 MG: 500 TABLET ORAL at 09:02

## 2025-07-29 ASSESSMENT — ACTIVITIES OF DAILY LIVING (ADL)
ADLS_ACUITY_SCORE: 29
ADLS_ACUITY_SCORE: 28
ADLS_ACUITY_SCORE: 29
ADLS_ACUITY_SCORE: 28
ADLS_ACUITY_SCORE: 29
ADLS_ACUITY_SCORE: 28
ADLS_ACUITY_SCORE: 29
ADLS_ACUITY_SCORE: 28
ADLS_ACUITY_SCORE: 29
ADLS_ACUITY_SCORE: 28
ADLS_ACUITY_SCORE: 29
ADLS_ACUITY_SCORE: 29

## 2025-07-29 NOTE — PROGRESS NOTES
Discharge instructions reviewed with the pt/spouse & his mother. Filled RX medications/side effects reviewed and provided to the pt. The pt & family stated full understanding of all discharge instructions. Discharged to home w/family.

## 2025-07-29 NOTE — DISCHARGE SUMMARY
[facility]  Hospitalist Discharge Summary      Date of Admission:  7/26/2025  Date of Discharge:  7/29/2025  Discharging Provider: Michael Shelton DO  Discharge Service: Hospitalist Service    Discharge Diagnoses   Ricci Brown is a 35 year old male with a past medical history of alcohol use disorder, recent admission for SBP along with pneumoperitoneum suspected secondary to paracentesis status post diagnostic laparoscopic on 7/16 that was negative for any perforation, ileus and acute kidney injury who presents with abnormal labs.     Patient was hospitalized here at [facility] from 7/8/2025- 7/21/2025 for SBP, pneumoperitoneum suspected secondary to paracentesis, secondary ileus, acute kidney injury.  He was treated with IV antibiotics along with slow advancement of his diet.  He was discharged on ciprofloxacin for 5 more days along with 20 mg of oral Lasix daily.  He was to continue on ciprofloxacin 500 mg daily indefinitely for prophylaxis of SBP per gastroenterology.  After discharge he had poor fluid intake by report.  He was found on recheck to have ARF and was admitted.     Nephrology consulted.  The patient received IV hydration and IV albumin with significant improvement in renal function.  [facility] was consulted and recommended initiation of prednisone for ongoing alcoholic hepatitis.     He was discharged home in stable condition with close [facility] follow-up.        Acute renal failure, improving  Anion gap metabolic acidosis  Mild hyponatremia, resolved:  Patient notes that he has felt quite well since discharge from the hospital.  His energy level has been better.  He denies any fevers or chills.  No abdominal pain.  Denies any nausea or vomiting though liquid intake poor by report.  He had loose stools the day he went home but those have resolved.  He has used oral diclofenac to manage knee pain since discharge.  Also was on lasix.       Suspect patient's renal failure is due to  dehydration in the setting of poor p.o. intake, furosemide along with contribution of NSAIDs taken on discharge.     Nephrology consulted and following (appreciated).  Received IV hydration and IV albumin with significant improvement in renal function.     Nephrology ok with discharge, recommend BMP within 1 week of discharge.      Recent SBP:  Patient received 10 days of ceftriaxone during his recent admission and then was transition to ciprofloxacin on 7/19.  He was discharged with an additional 1 week course of ciprofloxacin.  His cultures were negative from his ascitic fluid along with his blood cultures from his recent admission.  His procalcitonin was trending down.  No definitive new infection.  GI recommended long term cipro.  I have ordered daily cipro at the recommendation of GI.      Alcoholic cirrhosis with liver failure  Coagulopathy. secondary to liver failure  History of alcohol use disorder:  Patient has worsening of his LFTs with a rising bilirubin on admission.  He adamantly denies recent alcohol consumption and he has been with family who also corroborates.  [facility] recommend initiation of prednisolone for alcoholic hepatitis.  Have ordered 1 week of this.  Repeat CMP and INR in 5-7 days.  Ongoing dosing pending results.  Patient aware.      Weakness/deconditioning:  Patient feeling strong enough to dispo home.     Clinically Significant Risk Factors     # Obesity: Estimated body mass index is 32.85 kg/m  as calculated from the following:    Height as of this encounter: 1.829 m (6').    Weight as of this encounter: 109.9 kg (242 lb 3.2 oz).       Follow-ups Needed After Discharge   Follow-up Appointments       Hospital to Primary Care - Establish PCP Referral      Please be aware that coverage of these services is subject to the terms and limitations of your health insurance plan.  Call member services at your health plan with any benefit or coverage questions.    Schedule Primary Care visit within:  7 Days   Recommended labs and Imaging (to be ordered by Primary Care Provider): Repeat BMP on Friday or Monday 8/1 or 8/4.               Unresulted Labs Ordered in the Past 30 Days of this Admission       Date and Time Order Name Status Description    7/26/2025  8:36 PM Blood Culture Peripheral blood (BC) Arm, Right Preliminary     7/26/2025  7:38 PM Blood Culture Peripheral blood (BC) Arm, Left Preliminary         These results will be followed up by PCP    Discharge Disposition   Discharged to home  Condition at discharge: Stable    Consultations This Hospital Stay   GASTROENTEROLOGY IP CONSULT  NEPHROLOGY IP CONSULT  CONSULT FOR INPATIENT VASCULAR ACCESS CARE    Code Status   Full Code    Time Spent on this Encounter   IMichael DO, personally saw the patient today and spent greater than 30 minutes discharging this patient.       Michael Shelton DO  [place] [address] [facility]  [address] [place] [address]  Phone: [phone]  Fax: [phone]  ______________________________________________________________________    Physical Exam   Vital Signs: Temp: 98.6  F (37  C) Temp src: Oral BP: 132/73 Pulse: 109   Resp: 18 SpO2: 96 % O2 Device: None (Room air)    Weight: 242 lbs 3.2 oz  General Appearance: Patient awake & alert.  No apparent distress.  Scleral icterus and jaundice.   Respiratory: Lungs are CTAB.  Work of breathing appears normal on room air.  Cardiovascular: Regular rate and rhythm.  No murmurs rubs or gallops.  There is no edema present.  GI: Benign.  Moderate abdominal distension.  Non-tender.  Bowel sounds active.   Skin: No rashes or lesions exposed skin.  Neuro:  The patient is moving all extremities.  No obvious focal asymmetries.   Other: Patient is appropriate.       Primary Care Physician   Physician No Ref-Primary    Discharge Orders      Hospital to Primary Care - Establish PCP Referral      Reason for your hospital stay    You were hospitalized for  acute kidney injury.  This was thought multifactorial in nature from NSAID use, dehydration, diuretic medication, etc.  You were treated with hydration with improvement.     Activity    Your activity upon discharge: activity as tolerated     Diet    Follow this diet upon discharge:   Regular diet.       Significant Results and Procedures   Most Recent 3 CBC's:  Recent Labs   Lab Test 07/29/25  0653 07/28/25  0818 07/27/25  0637   WBC 6.1 6.6 9.0   HGB 9.0* 9.3* 9.9*   MCV 96 95 98   * 136* 167     Most Recent 2 LFT's:  Recent Labs   Lab Test 07/29/25  0653 07/28/25  0818   * 164*   ALT 62 68   ALKPHOS 267* 299*   BILITOTAL 20.0* 20.0*     Most Recent 3 INR's:  Recent Labs   Lab Test 07/29/25  0653 07/28/25  0818 07/27/25  0637   INR 2.06* 2.12* 2.49*       Discharge Medications      Review of your medicines        START taking        Dose / Directions   prednisoLONE 10 MG ODT  Commonly known as: ORAPRED ODT  Used for: Alcoholic hepatitis with ascites (H)      Dose: 40 mg  Take 4 tablets (40 mg) by mouth daily. This prescription may need to continue depending on repeat lab results.  Please discuss with your gastroenterology provider prior to needing a refill.  Quantity: 28 tablet  Refills: 0            CHANGE how you take these medications        Dose / Directions   ciprofloxacin 500 MG tablet  Commonly known as: CIPRO  Indication: SBP prophylaxis  This may have changed: when to take this  Used for: Alcoholic hepatitis with ascites (H)      Dose: 500 mg  Start taking on: July 30, 2025  Take 1 tablet (500 mg) by mouth every 24 hours.  Quantity: 28 tablet  Refills: 0            CONTINUE these medicines which have NOT CHANGED        Dose / Directions   cyclobenzaprine 5 MG tablet  Commonly known as: FLEXERIL  Used for: Physical deconditioning      Dose: 5 mg  Take 1 tablet (5 mg) by mouth every 8 hours as needed for muscle spasms.  Quantity: 15 tablet  Refills: 0     fexofenadine 60 MG tablet  Commonly  known as: ALLEGRA  Used for: Physical deconditioning      Dose: 60 mg  Take 1 tablet (60 mg) by mouth daily as needed for allergies.  Quantity: 30 tablet  Refills: 0     folic acid 1 MG tablet  Commonly known as: FOLVITE  Used for: Alcoholic cirrhosis of liver with ascites (H)      Dose: 1 mg  Take 1 tablet (1 mg) by mouth daily.  Quantity: 30 tablet  Refills: 0     MILK THISTLE PO      Dose: 1 tablet  Take 1 tablet by mouth daily.  Refills: 0     multivitamin, therapeutic Tabs tablet      Dose: 1 tablet  Take 1 tablet by mouth daily.  Refills: 0     thiamine 100 MG tablet  Commonly known as: B-1  Used for: Alcoholic cirrhosis of liver with ascites (H)      Dose: 100 mg  Take 1 tablet (100 mg) by mouth daily.  Quantity: 30 tablet  Refills: 0            STOP taking      diclofenac 1 % topical gel  Commonly known as: VOLTAREN        furosemide 20 MG tablet  Commonly known as: LASIX                  Where to get your medicines        These medications were sent to [facility] [address] [facility]  [address] [facility] [address]      Phone: [phone]   ciprofloxacin 500 MG tablet  prednisoLONE 10 MG ODT       Allergies   No Known Allergies

## 2025-07-29 NOTE — PLAN OF CARE
"Goal Outcome Evaluation:      Plan of Care Reviewed With: patient    Overall Patient Progress: no changeOverall Patient Progress: no change    Outcome Evaluation: Ptient denies pain. SBA. RA. Continues on albumin infusions. Family at bedside. Pt compliant with 1500 FR. Denies SOB, N/V, or CP. Abd distended and jaundiced.     Discharge plan TBD.    BP (!) 140/77 (BP Location: Right arm)   Pulse 112   Temp 98.8  F (37.1  C) (Oral)   Resp 20   Ht 1.829 m (6')   Wt 109.9 kg (242 lb 3.2 oz)   SpO2 97%   BMI 32.85 kg/m      Problem: Adult Inpatient Plan of Care  Goal: Plan of Care Review  Description: The Plan of Care Review/Shift note should be completed every shift.  The Outcome Evaluation is a brief statement about your assessment that the patient is improving, declining, or no change.  This information will be displayed automatically on your shift  note.  Outcome: Not Progressing  Flowsheets (Taken 7/29/2025 0611)  Outcome Evaluation: Ptient denies pain. SBA. RA. Continues on albumin infusions. Family at bedside.  Plan of Care Reviewed With: patient  Overall Patient Progress: no change  Goal: Patient-Specific Goal (Individualized)  Description: You can add care plan individualizations to a care plan. Examples of Individualization might be:  \"Parent requests to be called daily at 9am for status\", \"I have a hard time hearing out of my right ear\", or \"Do not touch me to wake me up as it startles  me\".  Outcome: Not Progressing  Goal: Absence of Hospital-Acquired Illness or Injury  Outcome: Not Progressing  Intervention: Identify and Manage Fall Risk  Recent Flowsheet Documentation  Taken 7/28/2025 2200 by Nano Eagle, RN  Safety Promotion/Fall Prevention: safety round/check completed  Intervention: Prevent Skin Injury  Recent Flowsheet Documentation  Taken 7/28/2025 2200 by Nano Eagle, RN  Body Position: position changed independently  Intervention: Prevent and Manage VTE (Venous Thromboembolism) " Risk  Recent Flowsheet Documentation  Taken 7/28/2025 2200 by Nano Eagle, RN  VTE Prevention/Management: patient refused intervention  Goal: Optimal Comfort and Wellbeing  Outcome: Not Progressing  Goal: Readiness for Transition of Care  Outcome: Not Progressing     Problem: Electrolyte Imbalance  Goal: Electrolyte Balance  Outcome: Not Progressing

## 2025-07-29 NOTE — PROGRESS NOTES
GASTROENTEROLOGY PROGRESS NOTE     SUBJECTIVE:  Doing well this morning. Denies abdominal pain, n/v. No confusion. Had 2 brown stools today. Family at bedside.      OBJECTIVE:    /73 (BP Location: Right arm)   Pulse 109   Temp 98.6  F (37  C) (Oral)   Resp 18   Ht 1.829 m (6')   Wt 109.9 kg (242 lb 3.2 oz)   SpO2 96%   BMI 32.85 kg/m    Temp (24hrs), Av.8  F (37.1  C), Min:98.6  F (37  C), Max:98.9  F (37.2  C)    Patient Vitals for the past 72 hrs:   Weight   25 0511 109.9 kg (242 lb 3.2 oz)   25 06 107.8 kg (237 lb 11.2 oz)   25 108.7 kg (239 lb 10.2 oz)       Intake/Output Summary (Last 24 hours) at 2025 1231  Last data filed at 2025 1038  Gross per 24 hour   Intake 1440 ml   Output 1755 ml   Net -315 ml        PHYSICAL EXAM  Gen: alert, oriented, NAD, +jaundice/scleral icterus.  Abd: soft, NT, mild distension, +BS  Ext: no edema.            Additional Comments:  ROS, FH, SH: See initial GI consult for details.     I have reviewed the patient's new clinical lab results:     Recent Labs   Lab Test 25  0625  0825  0637   WBC 6.1 6.6 9.0   HGB 9.0* 9.3* 9.9*   MCV 96 95 98   * 136* 167   INR 2.06* 2.12* 2.49*     Recent Labs   Lab Test 25  0625  0818 25  0637   POTASSIUM 3.8 3.6  3.5 4.3   CHLORIDE 96* 93*  94* 98   CO2 22 19*  20* 14*   BUN 39.5* 55.2*  55.4* 59.4*   ANIONGAP 17* 22*  20* 21*     Recent Labs   Lab Test 25  0653 25  0818 25  0637 254 25  0830 07/15/25  1413 25  0549 07/10/25  1345 25  1611 25  1512   ALBUMIN 3.4* 3.3*  3.4* 3.0* 3.3*   < >  --    < >  --    < > 4.4   BILITOTAL 20.0* 20.0* 19.5* 20.3*   < >  --    < >  --    < > 5.9*   ALT 62 68 82* 92*   < >  --    < >  --    < > 64   * 164* 195* 206*   < >  --    < >  --    < > 231*   PROTEIN  --   --   --  20*  --  20*  --  30*   < >  --    LIPASE  --   --   --   --   --   --   --    --   --  38    < > = values in this interval not displayed.     MELD 3.0: 33 at 7/29/2025  6:53 AM  MELD-Na: 33 at 7/29/2025  6:53 AM  Calculated from:  Serum Creatinine: 1.87 mg/dL at 7/29/2025  6:53 AM  Serum Sodium: 135 mmol/L at 7/29/2025  6:53 AM  Total Bilirubin: 20 mg/dL at 7/29/2025  6:53 AM  Serum Albumin: 3.4 g/dL at 7/29/2025  6:53 AM  INR(ratio): 2.06 at 7/29/2025  6:53 AM  Age at listing (hypothetical): 35 years  Sex: Male at 7/29/2025  6:53 AM    Imaging:  CT 7/26/25 no contrast:  1.  Severe hepatosplenomegaly. Nodularity of the liver surface contour consistent with cirrhosis and/or fibrosis.  2.  Fluid-filled loops of small bowel throughout the abdomen without high-grade obstruction. No free air demonstrated on today's CT.  3.  Gallstones and/or sludge. Mild pericholecystic edema likely generalized third spacing given perihepatic fluid and liver disease.     US 7/26/25  1. Mild splenomegaly and prominence in size of the liver.  2. A few sludge balls versus nonshadowing gallstones in the gallbladder.  3. Otherwise, unremarkable ultrasound of the upper abdomen.    Assessment:  34 y/o M with history of alcohol use disorder, recent admission (7/8-7/21) for ETOH hepatitis complicated by SBP and pneumoperitoneum suspected from paracentesis s/p diagnostic laparotomy 7/16 that was unremarkable, readmitted 7/26 with abnormal outpatient labs concerning for ISABELLE, mild hyponatremia.     ETOH hepatitis. Bili remains elevated/stable since admission ~20. No alcohol per patient since 7/7/25. CT with nodular liver and low platelets therefore likely has underlying fibrosis, possible cirrhosis. He was not started on steroids during previous admission due to SBP. No signs of infection. CT 7/26 without ascites. No signs of GI bleeding or encephalopathy. INR 2.06. MELD-na 33.     2. SBP. Diagnosed last admission. Prophylactic cipro started here.     3. ISABELLE. Renal following. Treated with albumin. Creatinine with  significant improvement today. Cr 1.87.     4. Hyponatremia. Normalized.     Plan:  -Start prednisolone 40mg daily for 7 days.   -Our office will arrange outpatient labs (CMP, INR) in 1 week to check Lille Score and decide on benefit of steroids.   -Continue prophylactic cipro for SBP.   -Our office will also arrange outpatient liver clinic follow up.   -Ok to discharge from GI standpoint.     Discussed with Dr. Bearden and Dr. Shelton from hospital team.     Time spent: 30 minutes, greater than 50% of the visit was spent in counseling/coordination of care.     Ayesha Phelps, PAC  [facility] ([facility])

## 2025-07-29 NOTE — PROGRESS NOTES
Renal Medicine Progress Note            Assessment/Plan:     Assessment:    ISABELLE   Baseline Cr normal ~0.5. Peak Cr 5.06. ISABELLE likely ATN related to NSAIDs (confirmed he was taking oral diclofenac), diuretic use and some hypovolemia. Responded well to IVF/albumin. Urine Na 40, less likely HRS (though this is on lasix).   - hold lasix   - no NSAIDs  - BMP Friday or Monday to ensure continued improvement     AGMA   Improved with IVF.     EtOH Cirrhosis   EtOH hepatitis   GI following.     Plan/Recs:  - no further IVF   - ok with discharge from nephrology perspective   - BMP Friday or Monday to ensure improvement   - no NSAIDs or lasix going forward       Discussed with Dr. Shelton. Reviewed notes from Dr. Parker/Wicho (hospitalists).     Dionisio Quesada MD   [facility]  Office: [phone]          Interval History:     No acute events overnight   Pt's wife and mother were updated at bedside   He reports feeling better after having two bowel movements. Still feels distended and backed up  Denies SOB   No LE edema   No n/v   Eating well          Medications and Allergies:     Current Facility-Administered Medications   Medication Dose Route Frequency Provider Last Rate Last Admin    ciprofloxacin (CIPRO) tablet 500 mg  500 mg Oral Q24H Beronica Looney, DO   500 mg at 07/29/25 0902    folic acid (FOLVITE) tablet 1 mg  1 mg Oral Daily Baljinder Park MD   1 mg at 07/29/25 0901    prednisoLONE (ORAPRED) 15 MG/5ML solution 40 mg  40 mg Oral Daily Ayesha Phelps PA-C   40 mg at 07/29/25 1200    sodium chloride (PF) 0.9% PF flush 3 mL  3 mL Intracatheter Q8H Baljinder Bradley MD   3 mL at 07/29/25 0902    thiamine (B-1) tablet 100 mg  100 mg Oral Daily Baljinder Park MD   100 mg at 07/29/25 0902      No Known Allergies         Physical Exam:   Vitals were reviewed  /73 (BP Location: Right arm)   Pulse 109   Temp 98.6  F (37  C) (Oral)   Resp 18   Ht 1.829 m (6')   Wt 109.9 kg  (242 lb 3.2 oz)   SpO2 96%   BMI 32.85 kg/m      Wt Readings from Last 3 Encounters:   07/29/25 109.9 kg (242 lb 3.2 oz)   07/21/25 105.7 kg (233 lb)   10/28/24 103.4 kg (228 lb)       Intake/Output Summary (Last 24 hours) at 7/28/2025 1637  Last data filed at 7/28/2025 1526  Gross per 24 hour   Intake 3010 ml   Output 1560 ml   Net 1450 ml       GENERAL APPEARANCE: NAD, jaundice  HEENT: normocephalic, MMM, scleral icterus  Deferred cardiorespiratory exam  ABDOMEN: distended   EXTREMITIES/SKIN: no edema  NEURO:  alert, oriented, normal speech            Data:     BMP  Recent Labs   Lab 07/29/25  0653 07/28/25  0818 07/27/25  1202 07/27/25  0637 07/26/25 2124    134*  134* 130* 133* 131*   POTASSIUM 3.8 3.6  3.5  --  4.3 4.1   CHLORIDE 96* 93*  94*  --  98 95*   REX 9.3 9.0  8.6*  --  9.0 9.2   CO2 22 19*  20*  --  14* 15*   BUN 39.5* 55.2*  55.4*  --  59.4* 56.7*   CR 1.87* 3.39*  3.39* 4.90* 5.06* 4.85*   * 130*  133*  --  111* 125*     CBC  Recent Labs   Lab 07/29/25  0653 07/28/25  0818 07/27/25  0637 07/26/25 2015   WBC 6.1 6.6 9.0 9.4   HGB 9.0* 9.3* 9.9* 10.5*   HCT 25.4* 25.9* 28.8* 30.3*   MCV 96 95 98 98   * 136* 167 177     Lab Results   Component Value Date     (H) 07/29/2025    ALT 62 07/29/2025    ALKPHOS 267 (H) 07/29/2025    BILITOTAL 20.0 (HH) 07/29/2025    KIRILL 86 (H) 07/27/2025     Lab Results   Component Value Date    INR 2.06 (H) 07/29/2025     Color Urine (no units)   Date Value   07/26/2025 Dark Yellow (A)     Appearance Urine (no units)   Date Value   07/26/2025 Slightly Cloudy (A)     Glucose Urine (mg/dL)   Date Value   07/26/2025 Negative     Bilirubin Urine (no units)   Date Value   07/26/2025 Large (A)     Ketones Urine (mg/dL)   Date Value   07/26/2025 Negative     Specific Gravity Urine (no units)   Date Value   07/26/2025 1.020     pH Urine (no units)   Date Value   07/26/2025 5.0     Protein Albumin Urine (mg/dL)   Date Value   07/26/2025 20 (A)      Nitrite Urine (no units)   Date Value   07/26/2025 Negative     Leukocyte Esterase Urine (no units)   Date Value   07/26/2025 Trace (A)         Attestation:  I have reviewed today's vital signs, notes, medications, labs and imaging.    Dioniiso Quesada MD  [facility] - Nephrology  Office: [phone]

## 2025-07-30 ENCOUNTER — PATIENT OUTREACH (OUTPATIENT)
Dept: CARE COORDINATION | Facility: CLINIC | Age: 35
End: 2025-07-30
Payer: COMMERCIAL

## 2025-07-31 ENCOUNTER — OFFICE VISIT (OUTPATIENT)
Dept: INTERNAL MEDICINE | Facility: CLINIC | Age: 35
End: 2025-07-31
Payer: COMMERCIAL

## 2025-07-31 VITALS
WEIGHT: 240 LBS | BODY MASS INDEX: 32.51 KG/M2 | OXYGEN SATURATION: 96 % | HEART RATE: 119 BPM | HEIGHT: 72 IN | DIASTOLIC BLOOD PRESSURE: 70 MMHG | SYSTOLIC BLOOD PRESSURE: 124 MMHG | RESPIRATION RATE: 18 BRPM | TEMPERATURE: 98.2 F

## 2025-07-31 DIAGNOSIS — R53.81 PHYSICAL DECONDITIONING: ICD-10-CM

## 2025-07-31 DIAGNOSIS — K70.31 ALCOHOLIC CIRRHOSIS OF LIVER WITH ASCITES (H): ICD-10-CM

## 2025-07-31 DIAGNOSIS — N18.9 ACUTE RENAL FAILURE SUPERIMPOSED ON CHRONIC KIDNEY DISEASE, UNSPECIFIED ACUTE RENAL FAILURE TYPE, UNSPECIFIED CKD STAGE: Primary | ICD-10-CM

## 2025-07-31 DIAGNOSIS — N17.9 ACUTE RENAL FAILURE SUPERIMPOSED ON CHRONIC KIDNEY DISEASE, UNSPECIFIED ACUTE RENAL FAILURE TYPE, UNSPECIFIED CKD STAGE: Primary | ICD-10-CM

## 2025-07-31 DIAGNOSIS — K70.11 ALCOHOLIC HEPATITIS WITH ASCITES (H): ICD-10-CM

## 2025-07-31 LAB
BACTERIA SPEC CULT: NORMAL
BACTERIA SPEC CULT: NORMAL
INR PPP: 1.63 (ref 0.85–1.15)
PROTHROMBIN TIME: 19.7 SECONDS (ref 11.8–14.8)

## 2025-07-31 PROCEDURE — 80048 BASIC METABOLIC PNL TOTAL CA: CPT | Performed by: INTERNAL MEDICINE

## 2025-07-31 PROCEDURE — 85610 PROTHROMBIN TIME: CPT | Performed by: INTERNAL MEDICINE

## 2025-07-31 PROCEDURE — 36415 COLL VENOUS BLD VENIPUNCTURE: CPT | Performed by: INTERNAL MEDICINE

## 2025-07-31 PROCEDURE — 99495 TRANSJ CARE MGMT MOD F2F 14D: CPT | Performed by: INTERNAL MEDICINE

## 2025-07-31 PROCEDURE — 3074F SYST BP LT 130 MM HG: CPT | Performed by: INTERNAL MEDICINE

## 2025-07-31 PROCEDURE — 3078F DIAST BP <80 MM HG: CPT | Performed by: INTERNAL MEDICINE

## 2025-07-31 PROCEDURE — 1111F DSCHRG MED/CURRENT MED MERGE: CPT | Performed by: INTERNAL MEDICINE

## 2025-08-01 LAB
ANION GAP SERPL CALCULATED.3IONS-SCNC: 13 MMOL/L (ref 7–15)
BUN SERPL-MCNC: 33.8 MG/DL (ref 6–20)
CALCIUM SERPL-MCNC: 10.2 MG/DL (ref 8.8–10.4)
CHLORIDE SERPL-SCNC: 98 MMOL/L (ref 98–107)
CREAT SERPL-MCNC: 1.12 MG/DL (ref 0.67–1.17)
EGFRCR SERPLBLD CKD-EPI 2021: 87 ML/MIN/1.73M2
GLUCOSE SERPL-MCNC: 131 MG/DL (ref 70–99)
HCO3 SERPL-SCNC: 21 MMOL/L (ref 22–29)
POTASSIUM SERPL-SCNC: 4 MMOL/L (ref 3.4–5.3)
SODIUM SERPL-SCNC: 132 MMOL/L (ref 135–145)

## 2025-08-04 ENCOUNTER — MYC MEDICAL ADVICE (OUTPATIENT)
Dept: INTERNAL MEDICINE | Facility: CLINIC | Age: 35
End: 2025-08-04
Payer: COMMERCIAL

## 2025-08-11 ENCOUNTER — TRANSFERRED RECORDS (OUTPATIENT)
Dept: MULTI SPECIALTY CLINIC | Facility: CLINIC | Age: 35
End: 2025-08-11
Payer: COMMERCIAL

## 2025-08-11 LAB
ALT SERPL-CCNC: 130 IU/L (ref 0–44)
AST SERPL-CCNC: 184 IU/L (ref 0–40)
CREATININE (EXTERNAL): 0.89 MG/DL (ref 0.76–1.27)
GFR ESTIMATED (EXTERNAL): 114 ML/MIN/1.73
GLUCOSE (EXTERNAL): 151 MG/DL (ref 70–99)
INR (EXTERNAL): 1.3 (ref 0.9–1.2)
POTASSIUM (EXTERNAL): 4.1 MMOL/L (ref 3.5–5.2)

## 2025-08-12 ENCOUNTER — TRANSFERRED RECORDS (OUTPATIENT)
Dept: ADMINISTRATIVE | Facility: CLINIC | Age: 35
End: 2025-08-12
Payer: COMMERCIAL

## 2025-08-15 ENCOUNTER — TRANSFERRED RECORDS (OUTPATIENT)
Dept: ADMINISTRATIVE | Facility: CLINIC | Age: 35
End: 2025-08-15
Payer: COMMERCIAL

## 2025-08-26 ENCOUNTER — OFFICE VISIT (OUTPATIENT)
Dept: INTERNAL MEDICINE | Facility: CLINIC | Age: 35
End: 2025-08-26
Payer: COMMERCIAL

## 2025-08-26 VITALS
SYSTOLIC BLOOD PRESSURE: 121 MMHG | OXYGEN SATURATION: 99 % | HEART RATE: 121 BPM | TEMPERATURE: 98.1 F | DIASTOLIC BLOOD PRESSURE: 84 MMHG | WEIGHT: 221 LBS | BODY MASS INDEX: 29.93 KG/M2 | HEIGHT: 72 IN | RESPIRATION RATE: 16 BRPM

## 2025-08-26 DIAGNOSIS — K70.11 ALCOHOLIC HEPATITIS WITH ASCITES (H): Primary | ICD-10-CM

## 2025-08-26 DIAGNOSIS — D64.9 ANEMIA, UNSPECIFIED TYPE: ICD-10-CM

## 2025-08-26 DIAGNOSIS — N18.9 ACUTE RENAL FAILURE SUPERIMPOSED ON CHRONIC KIDNEY DISEASE, UNSPECIFIED ACUTE RENAL FAILURE TYPE, UNSPECIFIED CKD STAGE: ICD-10-CM

## 2025-08-26 DIAGNOSIS — N17.9 ACUTE RENAL FAILURE SUPERIMPOSED ON CHRONIC KIDNEY DISEASE, UNSPECIFIED ACUTE RENAL FAILURE TYPE, UNSPECIFIED CKD STAGE: ICD-10-CM

## 2025-08-26 PROCEDURE — 3074F SYST BP LT 130 MM HG: CPT | Performed by: INTERNAL MEDICINE

## 2025-08-26 PROCEDURE — 3079F DIAST BP 80-89 MM HG: CPT | Performed by: INTERNAL MEDICINE

## 2025-08-26 PROCEDURE — G2211 COMPLEX E/M VISIT ADD ON: HCPCS | Performed by: INTERNAL MEDICINE

## 2025-08-26 PROCEDURE — 99214 OFFICE O/P EST MOD 30 MIN: CPT | Performed by: INTERNAL MEDICINE

## 2025-08-26 RX ORDER — CIPROFLOXACIN 500 MG/1
500 TABLET, FILM COATED ORAL EVERY 24 HOURS
Qty: 30 TABLET | Refills: 5 | Status: SHIPPED | OUTPATIENT
Start: 2025-08-26

## (undated) DEVICE — ENDO TROCAR SLEEVE KII Z-THREADED 05X100MM CTS02

## (undated) DEVICE — BAG CLEAR TRASH 1.3M 39X33" P4040C

## (undated) DEVICE — ESU ELEC BLADE 2.75" COATED/INSULATED E1455

## (undated) DEVICE — LINEN HALF SHEET 5512

## (undated) DEVICE — ESU GROUND PAD ADULT W/CORD E7507

## (undated) DEVICE — ESU PENCIL W/HOLSTER E2350H

## (undated) DEVICE — Device

## (undated) DEVICE — SUCTION IRR STRYKERFLOW II W/TIP 250-070-520

## (undated) DEVICE — ENDO TROCAR BLUNT TIP KII BALLOON 12X100MM C0R47

## (undated) DEVICE — SU VICRYL+ 0 27 UR6 VLT VCP603H

## (undated) DEVICE — BLADE KNIFE SURG 11 371111

## (undated) DEVICE — SU VICRYL+ 4-0 UNDYED PS-2 VCP496ZH

## (undated) DEVICE — GLOVE PROTEXIS BLUE W/NEU-THERA 7.0  2D73EB70

## (undated) DEVICE — GOWN IMPERVIOUS ZONED XLG 9041

## (undated) DEVICE — LINEN FULL SHEET 5511

## (undated) DEVICE — ENDO TROCAR FIRST ENTRY KII FIOS Z-THRD 05X100MM CTF03

## (undated) DEVICE — LINEN POUCH DBL 5427

## (undated) RX ORDER — BUPIVACAINE HYDROCHLORIDE AND EPINEPHRINE 2.5; 5 MG/ML; UG/ML
INJECTION, SOLUTION EPIDURAL; INFILTRATION; INTRACAUDAL; PERINEURAL
Status: DISPENSED
Start: 2025-07-16

## (undated) RX ORDER — DEXAMETHASONE SODIUM PHOSPHATE 4 MG/ML
INJECTION, SOLUTION INTRA-ARTICULAR; INTRALESIONAL; INTRAMUSCULAR; INTRAVENOUS; SOFT TISSUE
Status: DISPENSED
Start: 2025-07-16

## (undated) RX ORDER — ONDANSETRON 2 MG/ML
INJECTION INTRAMUSCULAR; INTRAVENOUS
Status: DISPENSED
Start: 2025-07-16

## (undated) RX ORDER — LIDOCAINE HYDROCHLORIDE 10 MG/ML
INJECTION, SOLUTION EPIDURAL; INFILTRATION; INTRACAUDAL; PERINEURAL
Status: DISPENSED
Start: 2025-07-16

## (undated) RX ORDER — METRONIDAZOLE 500 MG/100ML
INJECTION, SOLUTION INTRAVENOUS
Status: DISPENSED
Start: 2025-07-16

## (undated) RX ORDER — PROPOFOL 10 MG/ML
INJECTION, EMULSION INTRAVENOUS
Status: DISPENSED
Start: 2025-07-16

## (undated) RX ORDER — FENTANYL CITRATE 50 UG/ML
INJECTION, SOLUTION INTRAMUSCULAR; INTRAVENOUS
Status: DISPENSED
Start: 2025-07-16

## (undated) RX ORDER — CEFAZOLIN SODIUM/WATER 2 G/20 ML
SYRINGE (ML) INTRAVENOUS
Status: DISPENSED
Start: 2025-07-16